# Patient Record
Sex: FEMALE | Race: OTHER | HISPANIC OR LATINO | URBAN - METROPOLITAN AREA
[De-identification: names, ages, dates, MRNs, and addresses within clinical notes are randomized per-mention and may not be internally consistent; named-entity substitution may affect disease eponyms.]

---

## 2018-02-05 ENCOUNTER — EMERGENCY (EMERGENCY)
Facility: HOSPITAL | Age: 28
LOS: 0 days | Discharge: HOME | End: 2018-02-05
Attending: EMERGENCY MEDICINE

## 2018-02-05 VITALS
TEMPERATURE: 98 F | DIASTOLIC BLOOD PRESSURE: 86 MMHG | SYSTOLIC BLOOD PRESSURE: 127 MMHG | RESPIRATION RATE: 18 BRPM | HEART RATE: 78 BPM

## 2018-02-05 VITALS
RESPIRATION RATE: 18 BRPM | SYSTOLIC BLOOD PRESSURE: 149 MMHG | OXYGEN SATURATION: 95 % | HEART RATE: 90 BPM | TEMPERATURE: 98 F | DIASTOLIC BLOOD PRESSURE: 80 MMHG

## 2018-02-05 DIAGNOSIS — J45.909 UNSPECIFIED ASTHMA, UNCOMPLICATED: ICD-10-CM

## 2018-02-05 DIAGNOSIS — F32.9 MAJOR DEPRESSIVE DISORDER, SINGLE EPISODE, UNSPECIFIED: ICD-10-CM

## 2018-02-05 DIAGNOSIS — R45.851 SUICIDAL IDEATIONS: ICD-10-CM

## 2018-02-05 LAB
ALBUMIN SERPL ELPH-MCNC: 4.4 G/DL — SIGNIFICANT CHANGE UP (ref 3–5.5)
ALP SERPL-CCNC: 95 U/L — SIGNIFICANT CHANGE UP (ref 30–115)
ALT FLD-CCNC: 16 U/L — SIGNIFICANT CHANGE UP (ref 0–41)
ANION GAP SERPL CALC-SCNC: 7 MMOL/L — SIGNIFICANT CHANGE UP (ref 7–14)
APAP SERPL-MCNC: <10 UG/ML — SIGNIFICANT CHANGE UP (ref 10–30)
APPEARANCE UR: (no result)
AST SERPL-CCNC: 20 U/L — SIGNIFICANT CHANGE UP (ref 0–41)
BASOPHILS # BLD AUTO: 0.04 K/UL — SIGNIFICANT CHANGE UP (ref 0–0.2)
BASOPHILS NFR BLD AUTO: 0.5 % — SIGNIFICANT CHANGE UP (ref 0–1)
BILIRUB SERPL-MCNC: 0.5 MG/DL — SIGNIFICANT CHANGE UP (ref 0.2–1.2)
BILIRUB UR-MCNC: NEGATIVE — SIGNIFICANT CHANGE UP
BUN SERPL-MCNC: 9 MG/DL — LOW (ref 10–20)
CALCIUM SERPL-MCNC: 9.1 MG/DL — SIGNIFICANT CHANGE UP (ref 8.5–10.1)
CHLORIDE SERPL-SCNC: 104 MMOL/L — SIGNIFICANT CHANGE UP (ref 98–110)
CO2 SERPL-SCNC: 23 MMOL/L — SIGNIFICANT CHANGE UP (ref 17–32)
COLOR SPEC: YELLOW — SIGNIFICANT CHANGE UP
CREAT SERPL-MCNC: 0.6 MG/DL — LOW (ref 0.7–1.5)
DIFF PNL FLD: NEGATIVE — SIGNIFICANT CHANGE UP
EOSINOPHIL # BLD AUTO: 0.13 K/UL — SIGNIFICANT CHANGE UP (ref 0–0.7)
EOSINOPHIL NFR BLD AUTO: 1.6 % — SIGNIFICANT CHANGE UP (ref 0–8)
ETHANOL SERPL-MCNC: 6 MG/DL — HIGH
GLUCOSE SERPL-MCNC: 100 MG/DL — SIGNIFICANT CHANGE UP (ref 70–110)
GLUCOSE UR QL: NEGATIVE MG/DL — SIGNIFICANT CHANGE UP
HCG UR QL: NEGATIVE — SIGNIFICANT CHANGE UP
HCT VFR BLD CALC: 38.5 % — SIGNIFICANT CHANGE UP (ref 37–47)
HGB BLD-MCNC: 12.1 G/DL — LOW (ref 14–18)
IMM GRANULOCYTES NFR BLD AUTO: 0.4 % — HIGH (ref 0.1–0.3)
KETONES UR-MCNC: NEGATIVE — SIGNIFICANT CHANGE UP
LEUKOCYTE ESTERASE UR-ACNC: NEGATIVE — SIGNIFICANT CHANGE UP
LYMPHOCYTES # BLD AUTO: 2.39 K/UL — SIGNIFICANT CHANGE UP (ref 1.2–3.4)
LYMPHOCYTES # BLD AUTO: 29.9 % — SIGNIFICANT CHANGE UP (ref 20.5–51.1)
MCHC RBC-ENTMCNC: 25.4 PG — LOW (ref 27–31)
MCHC RBC-ENTMCNC: 31.4 G/DL — LOW (ref 32–37)
MCV RBC AUTO: 80.7 FL — LOW (ref 81–91)
MONOCYTES # BLD AUTO: 0.55 K/UL — SIGNIFICANT CHANGE UP (ref 0.1–0.6)
MONOCYTES NFR BLD AUTO: 6.9 % — SIGNIFICANT CHANGE UP (ref 1.7–9.3)
NEUTROPHILS # BLD AUTO: 4.85 K/UL — SIGNIFICANT CHANGE UP (ref 1.4–6.5)
NEUTROPHILS NFR BLD AUTO: 60.7 % — SIGNIFICANT CHANGE UP (ref 42.2–75.2)
NITRITE UR-MCNC: NEGATIVE — SIGNIFICANT CHANGE UP
PH UR: 5.5 — SIGNIFICANT CHANGE UP (ref 5–8)
PLATELET # BLD AUTO: 353 K/UL — SIGNIFICANT CHANGE UP (ref 130–400)
POTASSIUM SERPL-MCNC: 3.6 MMOL/L — SIGNIFICANT CHANGE UP (ref 3.5–5)
POTASSIUM SERPL-SCNC: 3.6 MMOL/L — SIGNIFICANT CHANGE UP (ref 3.5–5)
PROT SERPL-MCNC: 7.3 G/DL — SIGNIFICANT CHANGE UP (ref 6–8)
PROT UR-MCNC: NEGATIVE MG/DL — SIGNIFICANT CHANGE UP
RBC # BLD: 4.77 M/UL — SIGNIFICANT CHANGE UP (ref 4.2–5.4)
RBC # FLD: 14.7 % — HIGH (ref 11.5–14.5)
SALICYLATES SERPL-MCNC: <4 MG/DL — SIGNIFICANT CHANGE UP (ref 4–30)
SODIUM SERPL-SCNC: 134 MMOL/L — LOW (ref 135–146)
SP GR SPEC: 1.02 — SIGNIFICANT CHANGE UP (ref 1.01–1.03)
UROBILINOGEN FLD QL: 1 MG/DL (ref 0.2–0.2)
WBC # BLD: 7.99 K/UL — SIGNIFICANT CHANGE UP (ref 4.8–10.8)
WBC # FLD AUTO: 7.99 K/UL — SIGNIFICANT CHANGE UP (ref 4.8–10.8)

## 2018-02-05 RX ORDER — ACETAMINOPHEN 500 MG
650 TABLET ORAL ONCE
Qty: 0 | Refills: 0 | Status: COMPLETED | OUTPATIENT
Start: 2018-02-05 | End: 2018-02-05

## 2018-02-05 RX ADMIN — Medication 650 MILLIGRAM(S): at 10:39

## 2018-02-05 RX ADMIN — Medication 650 MILLIGRAM(S): at 11:32

## 2018-02-05 NOTE — ED ADULT NURSE NOTE - OBJECTIVE STATEMENT
Pt with HX- depression in the past not on any medication C/O one episode of suicide thought at 0400 AM .pt state she" had thought of jumping from someway "  denies  no pain no SOB no suicide thought at this time remain calm ED attending at bed site 1:1 sit at bed site remain calm on going nursing observation .

## 2018-02-05 NOTE — ED BEHAVIORAL HEALTH ASSESSMENT NOTE - RISK ASSESSMENT
Although patient has low mood, several stressors, patient's risk is mitigated by not meeting criteria for other depressive symptoms, no SI/HI, no history of SA, being future oriented, responsibility toward others, having family support, and being engaged at work/school.

## 2018-02-05 NOTE — ED PROVIDER NOTE - ATTENDING CONTRIBUTION TO CARE
28 year old female, pmhx of depression, has seen a psychiatrist in the past, comes in with complaint f depression, no Suicidal plan, no HI, no AV hallucinations, no cp/sob, no n/v/d, no loc, no fever. Patient feels well otherwise. requesting to speak to a psychiatrist.     Exam: Well appearing, no acute distress, AAO x 3, sitting up and providing history, EOMI, PERRL 3mm bilateral, no nystagmus, HEENT Unremarkable, + moist mucous membranes, no pooling of secretions, no jvd, + full passive rom in neck, negative Kernig, negative Brudzinski, s1s2, no mrg, rrr, + symmetric bilateral pulses, ctabl, no wrr, good air movement overall, no pulsatile abdominal mass, abd soft, nt nd, no rebound, no guarding, no signs of peritonitis, no cva tenderness, no rash, no leg edema, dp and pt pulses intact. No calf pain, swelling or erythema, Ambulatory. Strength intact symmetrically. CN 2-12 grossly intact, GCS 15. Affest appropriate, in the room speaking with 1:1. No SI or HI, no AV hallucinations. Not agitated. P: Psychiatric eval

## 2018-02-05 NOTE — ED BEHAVIORAL HEALTH ASSESSMENT NOTE - OTHER PAST PSYCHIATRIC HISTORY (INCLUDE DETAILS REGARDING ONSET, COURSE OF ILLNESS, INPATIENT/OUTPATIENT TREATMENT)
1 adolescent IPP admission at [facility], patient was seeing a therapist up until 6-7 years ago.  No IPP admissions as an adult, No SA, No SIB.

## 2018-02-05 NOTE — ED PROVIDER NOTE - PROGRESS NOTE DETAILS
Discussed pt status with psychiatry, who will assess pt at bedside. Psychiatry bedside Discussed pt status with psychiatry, pt approved for outpatient follow up, scheduled appt on 2/15 at 8:45 am, pt aware.

## 2018-02-05 NOTE — ED PROVIDER NOTE - OBJECTIVE STATEMENT
29 yo f w hx of asthma and depression, not on medications, p/w suicidal ideation that began last night d/t stressors at work and home.  No specific plan at this time, is interested in starting antidepressant therapy.  Denies chest pain, sob, urinary symptoms.

## 2018-02-05 NOTE — ED BEHAVIORAL HEALTH ASSESSMENT NOTE - SUICIDE PROTECTIVE FACTORS
Responsibility to family and others/Supportive social network or family/Ability to cope with stress/Identifies reasons for living/Future oriented/Engaged in work or school

## 2018-02-05 NOTE — ED BEHAVIORAL HEALTH ASSESSMENT NOTE - DESCRIPTION
labs drawn thyroid disease? hypertension Patient was adopted, lives in [place] with her fiance and three children. 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

## 2018-02-05 NOTE — ED BEHAVIORAL HEALTH ASSESSMENT NOTE - DIFFERENTIAL
depressive episode with insufficient symptoms  adjustment disorder with depressed mood  anxiety disorder nos  MDD depressive episode with insufficient symptoms  adjustment disorder with depressed mood  MDD depressive disorder  depressive episode with insufficient symptoms  adjustment disorder with depressed mood  MDD

## 2018-02-05 NOTE — ED BEHAVIORAL HEALTH ASSESSMENT NOTE - DESCRIPTION (FIRST USE, LAST USE, QUANTITY, FREQUENCY, DURATION)
stopped using cigarettes 2-3 months ago, currently vaping last use was one drink yesterday after work

## 2018-02-05 NOTE — ED BEHAVIORAL HEALTH ASSESSMENT NOTE - NS ED BHA REVIEW OF ED CHART VITAL SIGNS REVIEWED
Please follow up with Dr. Mejia within 1 week after discharge from the hospital. You may call [phone] to schedule an appointment. Yes

## 2018-02-05 NOTE — ED PROVIDER NOTE - MEDICAL DECISION MAKING DETAILS
Patient has OP follow up established, patient has been cleared by psych. Patient stable for discharge, given detailed return instructions.   I personally evaluated the patient. I reviewed the Resident’s or Physician Assistant’s note (as assigned above), and agree with the findings and plan except as documented in my note.

## 2018-02-05 NOTE — ED BEHAVIORAL HEALTH ASSESSMENT NOTE - REFERRAL / APPOINTMENT DETAILS
[facility] Outpatient Psychiatry Dept. [address] [place] [id number] [phone] [facility] Psychiatry Department [address] [place] [address] [phone], February 15th at 8:45 AM

## 2018-02-05 NOTE — ED BEHAVIORAL HEALTH ASSESSMENT NOTE - HPI (INCLUDE ILLNESS QUALITY, SEVERITY, DURATION, TIMING, CONTEXT, MODIFYING FACTORS, ASSOCIATED SIGNS AND SYMPTOMS)
28 year old employed, domiciled, female, in a domestic partnership, with pphx of a "learning disability," and "depression as a kid," 1 IPP admission at [facility] as an adolescent, presents to the ED with feelings of sadness for the past 1-2 days. Psychiatry consult called for mental health evaluation. Upon approach patient is calm, cooperative seen talking to her one-to-one supervision. Patient reports that she has been feeling overwhelmed with her all of her responsibilities over the past two days. Patient reports that "Usually, I am a happy person but once in a while I feel overwhelmed." Patient also reports recent losses in the family including the father of her older 2 children, her own father, and her grandparents over the past few years. Patient reports that she has been in therapy in the past, about 6-7 years ago and reports that it was helpful. Patient also reports that she has always had trouble sleeping well. Patient reports that she is the "happiest" when she is at work because she enjoys "helping others." When asked about why she is eager to get help patient reports, "I have to be well to take care of my kids." When asked about suicidality patient reports "Never, I have three children and family." Patient denies any changes in her appetite but does report trying to lose weight since she is overweight and wants to be healthier. Patient denies any SI/HI. Patient denies any anxiety. Patient denies any periods of elevated mood, going days without sleep, and/or racing thoughts. Patient denies any AVH. Patient reports that although she has these symptoms every now and then, patient also reports that she continues to attend work, take care of her kids, the home and attend her classes. Patient does report that she has not seen a PCP in sometime, and has had thyroid disease in the past but does not take any medications.    Collateral: [phone] Bonny Nagel (patient's sister), attempted to reach twice 28 year old employed, domiciled, female, in a domestic partnership, with pphx of a "learning disability," and "depression as a kid," 1 IPP admission at [facility] as an adolescent, presents to the ED with feelings of sadness for the past 1-2 days. Psychiatry consult called for mental health evaluation. Upon approach patient is calm, cooperative seen talking to her one-to-one supervision. Patient reports that she has been feeling overwhelmed with her all of her responsibilities over the past two days. Patient reports that "Usually, I am a happy person but once in a while I feel overwhelmed." Patient also reports recent losses in the family including the father of her older 2 children, her own father, and her grandparents over the past few years. Patient reports that she has been in therapy in the past, about 6-7 years ago and reports that it was helpful. Patient also reports that she has always had trouble sleeping well. Patient reports that she is the "happiest" when she is at work because she enjoys "helping others." When asked about why she is eager to get help patient reports, "I have to be well to take care of my kids." When asked about suicidality patient reports "Never, I have three children and family." Patient denies any changes in her appetite but does report trying to lose weight since she is overweight and wants to be healthier. Patient denies any SI/HI. Patient denies any anxiety. Patient denies any periods of elevated mood, going days without sleep, and/or racing thoughts. Patient denies any AVH. Patient reports that although she has these symptoms every now and then, patient also reports that she continues to attend work, take care of her kids, the home and attend her classes. Patient does report that she has not seen a PCP in sometime, and has had thyroid disease in the past but does not take any medications.    Collateral: [phone] Bonny Nagel (patient's sister), attempted to reach twice, unable to reach her

## 2018-02-05 NOTE — ED PROVIDER NOTE - PHYSICAL EXAMINATION
CONSTITUTIONAL: Well-developed; well-nourished; in no acute distress.   SKIN: warm, dry  HEAD: Normocephalic; atraumatic.  EYES: no conj injection  ENT: No nasal discharge; airway clear.  NECK: Supple; non tender.  CARD: S1, S2 normal; no murmurs, gallops, or rubs. Regular rate and rhythm.   RESP: No wheezes, rales or rhonchi.  ABD: soft ntnd  EXT: Normal ROM.  No clubbing, cyanosis or edema.   NEURO: Alert, oriented, grossly unremarkable  PSYCH: Cooperative, anxious.

## 2018-02-05 NOTE — ED PROVIDER NOTE - NS ED ROS FT
GEN:  No fevers, chills  Eyes:  No visual changes, eye pain or discharge.  ENMT:  No hearing changes, pain, No neck pain or stiffness.  Cardiac:  No chest pain, SOB   Respiratory:  No cough or respiratory distress.   GI:  No nausea, vomiting, diarrhea or abdominal pain.  :  No dysuria, frequency or burning.  MS:  No myalgia, muscle weakness, joint pain or back pain.  Neuro:  No headache or weakness.  No LOC.  Skin:  No skin rash.   Endocrine: No history of thyroid disease or diabetes.

## 2018-02-05 NOTE — ED BEHAVIORAL HEALTH ASSESSMENT NOTE - CASE SUMMARY
Patient will benefit from supportive psychotherapy on out patient basis. No psychotropic medication is recommended at this time.

## 2018-02-05 NOTE — ED BEHAVIORAL HEALTH ASSESSMENT NOTE - SUMMARY
28 year old female with pphx of depression as an adolescent, presents to the ED for low mood and feeling overwhelmed over the past few days. Upon examination patient appears calm, cooperative, appears to has linear thought process, congruent affect, no perceptual abnormalities, good insight and judgement. Patient denies any suicidality and/or homicidality. At this time patient does not appear to meet criteria for acute mood/psychosis symptoms. Patient does not demonstrate need for involuntary IPP stabilization. Patient may benefit from outpatient psychiatry care for further assessment and treatment. Patient may also benefit from having thyroid function evaluated and treating appropriately.

## 2018-04-20 ENCOUNTER — EMERGENCY (EMERGENCY)
Facility: HOSPITAL | Age: 28
LOS: 0 days | Discharge: HOME | End: 2018-04-20
Attending: STUDENT IN AN ORGANIZED HEALTH CARE EDUCATION/TRAINING PROGRAM | Admitting: STUDENT IN AN ORGANIZED HEALTH CARE EDUCATION/TRAINING PROGRAM

## 2018-04-20 VITALS
OXYGEN SATURATION: 98 % | HEART RATE: 71 BPM | TEMPERATURE: 98 F | RESPIRATION RATE: 18 BRPM | SYSTOLIC BLOOD PRESSURE: 126 MMHG | DIASTOLIC BLOOD PRESSURE: 73 MMHG

## 2018-04-20 VITALS
TEMPERATURE: 99 F | RESPIRATION RATE: 18 BRPM | DIASTOLIC BLOOD PRESSURE: 70 MMHG | HEART RATE: 76 BPM | SYSTOLIC BLOOD PRESSURE: 132 MMHG | OXYGEN SATURATION: 98 %

## 2018-04-20 DIAGNOSIS — Z88.8 ALLERGY STATUS TO OTHER DRUGS, MEDICAMENTS AND BIOLOGICAL SUBSTANCES STATUS: ICD-10-CM

## 2018-04-20 DIAGNOSIS — R42 DIZZINESS AND GIDDINESS: ICD-10-CM

## 2018-04-20 LAB
ALBUMIN SERPL ELPH-MCNC: 4.4 G/DL — SIGNIFICANT CHANGE UP (ref 3.5–5.2)
ALP SERPL-CCNC: 107 U/L — SIGNIFICANT CHANGE UP (ref 30–115)
ALT FLD-CCNC: 10 U/L — SIGNIFICANT CHANGE UP (ref 0–41)
ANION GAP SERPL CALC-SCNC: 13 MMOL/L — SIGNIFICANT CHANGE UP (ref 7–14)
AST SERPL-CCNC: 14 U/L — SIGNIFICANT CHANGE UP (ref 0–41)
BILIRUB SERPL-MCNC: 0.3 MG/DL — SIGNIFICANT CHANGE UP (ref 0.2–1.2)
BUN SERPL-MCNC: 10 MG/DL — SIGNIFICANT CHANGE UP (ref 10–20)
CALCIUM SERPL-MCNC: 9.3 MG/DL — SIGNIFICANT CHANGE UP (ref 8.5–10.1)
CHLORIDE SERPL-SCNC: 101 MMOL/L — SIGNIFICANT CHANGE UP (ref 98–110)
CO2 SERPL-SCNC: 27 MMOL/L — SIGNIFICANT CHANGE UP (ref 17–32)
CREAT SERPL-MCNC: 0.6 MG/DL — LOW (ref 0.7–1.5)
GLUCOSE SERPL-MCNC: 72 MG/DL — SIGNIFICANT CHANGE UP (ref 70–99)
HCT VFR BLD CALC: 38.3 % — SIGNIFICANT CHANGE UP (ref 37–47)
HGB BLD-MCNC: 12.2 G/DL — SIGNIFICANT CHANGE UP (ref 12–16)
MCHC RBC-ENTMCNC: 25.8 PG — LOW (ref 27–31)
MCHC RBC-ENTMCNC: 31.9 G/DL — LOW (ref 32–37)
MCV RBC AUTO: 81 FL — SIGNIFICANT CHANGE UP (ref 81–99)
NRBC # BLD: 0 /100 WBCS — SIGNIFICANT CHANGE UP (ref 0–0)
PLATELET # BLD AUTO: 340 K/UL — SIGNIFICANT CHANGE UP (ref 130–400)
POTASSIUM SERPL-MCNC: 4.1 MMOL/L — SIGNIFICANT CHANGE UP (ref 3.5–5)
POTASSIUM SERPL-SCNC: 4.1 MMOL/L — SIGNIFICANT CHANGE UP (ref 3.5–5)
PROT SERPL-MCNC: 7.2 G/DL — SIGNIFICANT CHANGE UP (ref 6–8)
RBC # BLD: 4.73 M/UL — SIGNIFICANT CHANGE UP (ref 4.2–5.4)
RBC # FLD: 14.5 % — SIGNIFICANT CHANGE UP (ref 11.5–14.5)
SODIUM SERPL-SCNC: 141 MMOL/L — SIGNIFICANT CHANGE UP (ref 135–146)
TROPONIN T SERPL-MCNC: <0.01 NG/ML — SIGNIFICANT CHANGE UP
WBC # BLD: 6.05 K/UL — SIGNIFICANT CHANGE UP (ref 4.8–10.8)
WBC # FLD AUTO: 6.05 K/UL — SIGNIFICANT CHANGE UP (ref 4.8–10.8)

## 2018-04-20 RX ORDER — MECLIZINE HCL 12.5 MG
1 TABLET ORAL
Qty: 5 | Refills: 0 | OUTPATIENT
Start: 2018-04-20 | End: 2018-04-24

## 2018-04-20 RX ORDER — MECLIZINE HCL 12.5 MG
1 TABLET ORAL
Qty: 5 | Refills: 0
Start: 2018-04-20 | End: 2018-04-24

## 2018-04-20 RX ORDER — MECLIZINE HCL 12.5 MG
25 TABLET ORAL ONCE
Qty: 0 | Refills: 0 | Status: DISCONTINUED | OUTPATIENT
Start: 2018-04-20 | End: 2018-04-20

## 2018-04-20 NOTE — ED PROVIDER NOTE - OBJECTIVE STATEMENT
27 y/o F with pmh as below presents c/o of vertigo like symptoms. No nausea, vomiting. No HA. No hearing loss or pain. No tinnitus. No palpitations. No CP, SOB.

## 2018-04-20 NOTE — ED PROVIDER NOTE - ATTENDING CONTRIBUTION TO CARE
27 y/o F no sig pmh, p/w dizziness, described as vertigo.  Sx worse w/ position change, extinguish w/ remaining still.  NO fever, trauma, uri sx, other focal neuro deficit, or HA.  PE as noted.  Pt has mild sx, is well appearing, non focal neuro exam.  IMP: vertigo, NL exam. P: meclizine, upreg, IVF, reassess.

## 2018-05-04 ENCOUNTER — OUTPATIENT (OUTPATIENT)
Dept: OUTPATIENT SERVICES | Facility: HOSPITAL | Age: 28
LOS: 1 days | Discharge: HOME | End: 2018-05-04

## 2018-05-04 ENCOUNTER — LABORATORY RESULT (OUTPATIENT)
Age: 28
End: 2018-05-04

## 2018-05-04 ENCOUNTER — APPOINTMENT (OUTPATIENT)
Dept: OBGYN | Facility: CLINIC | Age: 28
End: 2018-05-04

## 2018-05-04 VITALS
WEIGHT: 250 LBS | HEIGHT: 63 IN | DIASTOLIC BLOOD PRESSURE: 90 MMHG | BODY MASS INDEX: 44.3 KG/M2 | SYSTOLIC BLOOD PRESSURE: 128 MMHG

## 2018-05-04 DIAGNOSIS — Z86.19 PERSONAL HISTORY OF OTHER INFECTIOUS AND PARASITIC DISEASES: ICD-10-CM

## 2018-05-07 LAB
B-HCG UR QL: NEGATIVE
HCG UR QL: NEGATIVE

## 2018-05-17 ENCOUNTER — OUTPATIENT (OUTPATIENT)
Dept: OUTPATIENT SERVICES | Facility: HOSPITAL | Age: 28
LOS: 1 days | Discharge: HOME | End: 2018-05-17

## 2018-05-17 ENCOUNTER — APPOINTMENT (OUTPATIENT)
Dept: ANTEPARTUM | Facility: CLINIC | Age: 28
End: 2018-05-17

## 2018-05-18 ENCOUNTER — APPOINTMENT (OUTPATIENT)
Dept: OBGYN | Facility: CLINIC | Age: 28
End: 2018-05-18

## 2018-05-18 VITALS — BODY MASS INDEX: 45.35 KG/M2 | DIASTOLIC BLOOD PRESSURE: 80 MMHG | WEIGHT: 256 LBS | SYSTOLIC BLOOD PRESSURE: 112 MMHG

## 2018-05-18 LAB
APTT BLD: 34.1 SEC
BASOPHILS # BLD AUTO: 0.03 K/UL
BASOPHILS NFR BLD AUTO: 0.5 %
CHOLEST SERPL-MCNC: 210 MG/DL
CHOLEST/HDLC SERPL: 4.2 RATIO
DHEA-S SERPL-MCNC: 113 UG/DL
EOSINOPHIL # BLD AUTO: 0.09 K/UL
EOSINOPHIL NFR BLD AUTO: 1.6 %
ESTRADIOL SERPL-MCNC: 146 PG/ML
FSH SERPL-MCNC: 3.8 IU/L
GLUCOSE 1H P 100 G GLC PO SERPL-MCNC: 98 MG/DL
GLUCOSE 2H P 100 G GLC PO SERPL-MCNC: 57 MG/DL
GLUCOSE BS SERPL-MCNC: 90 MG/DL
HCG SERPL-MCNC: <0.6 MIU/ML
HCT VFR BLD CALC: 37 %
HDLC SERPL-MCNC: 50 MG/DL
HGB BLD-MCNC: 11.1 G/DL
IMM GRANULOCYTES NFR BLD AUTO: 0.2 %
INR PPP: 1.13 RATIO
LDLC SERPL CALC-MCNC: 154 MG/DL
LYMPHOCYTES # BLD AUTO: 1.84 K/UL
LYMPHOCYTES NFR BLD AUTO: 32.7 %
MAN DIFF?: NORMAL
MCHC RBC-ENTMCNC: 25.4 PG
MCHC RBC-ENTMCNC: 30 G/DL
MCV RBC AUTO: 84.7 FL
MONOCYTES # BLD AUTO: 0.49 K/UL
MONOCYTES NFR BLD AUTO: 8.7 %
NEUTROPHILS # BLD AUTO: 3.17 K/UL
NEUTROPHILS NFR BLD AUTO: 56.3 %
PLATELET # BLD AUTO: 334 K/UL
PROLACTIN SERPL-MCNC: 8 NG/ML
PT BLD: 12.1 SEC
RBC # BLD: 4.37 M/UL
RBC # FLD: 15 %
T4 FREE SERPL-MCNC: 1.2 NG/DL
TRIGL SERPL-MCNC: 103 MG/DL
TSH SERPL-ACNC: 2.43 UIU/ML
WBC # FLD AUTO: 5.63 K/UL

## 2018-05-21 LAB
TESTOST BND SERPL-MCNC: 0.6 PG/ML
TESTOST SERPL-MCNC: 15.6 NG/DL

## 2018-05-22 DIAGNOSIS — Z00.00 ENCOUNTER FOR GENERAL ADULT MEDICAL EXAMINATION WITHOUT ABNORMAL FINDINGS: ICD-10-CM

## 2018-05-25 ENCOUNTER — OUTPATIENT (OUTPATIENT)
Dept: OUTPATIENT SERVICES | Facility: HOSPITAL | Age: 28
LOS: 1 days | Discharge: HOME | End: 2018-05-25

## 2018-05-25 ENCOUNTER — LABORATORY RESULT (OUTPATIENT)
Age: 28
End: 2018-05-25

## 2018-05-25 ENCOUNTER — APPOINTMENT (OUTPATIENT)
Dept: OBGYN | Facility: CLINIC | Age: 28
End: 2018-05-25

## 2018-05-25 VITALS — WEIGHT: 255 LBS | BODY MASS INDEX: 45.17 KG/M2 | SYSTOLIC BLOOD PRESSURE: 126 MMHG | DIASTOLIC BLOOD PRESSURE: 80 MMHG

## 2018-05-25 LAB
17OHP SERPL-MCNC: 35 NG/DL
HPV HIGH+LOW RISK DNA PNL CVX: DETECTED

## 2018-05-29 DIAGNOSIS — R89.7 ABNORMAL HISTOLOGICAL FINDINGS IN SPECIMENS FROM OTHER ORGANS, SYSTEMS AND TISSUES: ICD-10-CM

## 2018-05-29 LAB
HCG UR QL: NEGATIVE
QUALITY CONTROL: YES

## 2018-06-15 ENCOUNTER — APPOINTMENT (OUTPATIENT)
Dept: OBGYN | Facility: CLINIC | Age: 28
End: 2018-06-15

## 2018-06-25 ENCOUNTER — OTHER (OUTPATIENT)
Age: 28
End: 2018-06-25

## 2018-07-06 ENCOUNTER — APPOINTMENT (OUTPATIENT)
Dept: OBGYN | Facility: CLINIC | Age: 28
End: 2018-07-06

## 2019-03-01 ENCOUNTER — EMERGENCY (EMERGENCY)
Facility: HOSPITAL | Age: 29
LOS: 1 days | Discharge: HOME | End: 2019-03-01
Attending: EMERGENCY MEDICINE | Admitting: EMERGENCY MEDICINE
Payer: MEDICAID

## 2019-03-01 VITALS
HEART RATE: 97 BPM | TEMPERATURE: 98 F | RESPIRATION RATE: 17 BRPM | SYSTOLIC BLOOD PRESSURE: 138 MMHG | OXYGEN SATURATION: 98 % | DIASTOLIC BLOOD PRESSURE: 78 MMHG

## 2019-03-01 DIAGNOSIS — Z79.899 OTHER LONG TERM (CURRENT) DRUG THERAPY: ICD-10-CM

## 2019-03-01 DIAGNOSIS — O26.892 OTHER SPECIFIED PREGNANCY RELATED CONDITIONS, SECOND TRIMESTER: ICD-10-CM

## 2019-03-01 DIAGNOSIS — Z3A.17 17 WEEKS GESTATION OF PREGNANCY: ICD-10-CM

## 2019-03-01 DIAGNOSIS — R10.2 PELVIC AND PERINEAL PAIN: ICD-10-CM

## 2019-03-01 DIAGNOSIS — M54.5 LOW BACK PAIN: ICD-10-CM

## 2019-03-01 DIAGNOSIS — I10 ESSENTIAL (PRIMARY) HYPERTENSION: ICD-10-CM

## 2019-03-01 PROCEDURE — 99283 EMERGENCY DEPT VISIT LOW MDM: CPT | Mod: 25

## 2019-03-01 NOTE — ED ADULT TRIAGE NOTE - CHIEF COMPLAINT QUOTE
pt ambulatory complaining of sudden onset lower back pain [place] the train. State she's 17 weeks pregnant, LMP Nov 7, 2019. Also reports pelvic pain for few weeks now. pt ambulatory complaining of sudden onset lower back pain while seated on the train. State she's 17 weeks pregnant, LMP Nov 2, 2019. Also reports pelvic pain for few weeks now. States she fell one flight of stairs prior to arrival.

## 2019-03-02 VITALS
RESPIRATION RATE: 17 BRPM | TEMPERATURE: 98 F | DIASTOLIC BLOOD PRESSURE: 76 MMHG | SYSTOLIC BLOOD PRESSURE: 111 MMHG | OXYGEN SATURATION: 98 % | HEART RATE: 77 BPM

## 2019-03-02 LAB
APPEARANCE UR: CLEAR — SIGNIFICANT CHANGE UP
BILIRUB UR-MCNC: NEGATIVE — SIGNIFICANT CHANGE UP
COLOR SPEC: YELLOW — SIGNIFICANT CHANGE UP
DIFF PNL FLD: NEGATIVE — SIGNIFICANT CHANGE UP
GLUCOSE UR QL: NEGATIVE — SIGNIFICANT CHANGE UP
KETONES UR-MCNC: NEGATIVE — SIGNIFICANT CHANGE UP
LEUKOCYTE ESTERASE UR-ACNC: NEGATIVE — SIGNIFICANT CHANGE UP
NITRITE UR-MCNC: NEGATIVE — SIGNIFICANT CHANGE UP
PH UR: 6 — SIGNIFICANT CHANGE UP (ref 5–8)
PROT UR-MCNC: NEGATIVE MG/DL — SIGNIFICANT CHANGE UP
SP GR SPEC: >=1.03 — SIGNIFICANT CHANGE UP (ref 1–1.03)
UROBILINOGEN FLD QL: 0.2 E.U./DL — SIGNIFICANT CHANGE UP

## 2019-03-02 RX ORDER — ACETAMINOPHEN 500 MG
650 TABLET ORAL ONCE
Qty: 0 | Refills: 0 | Status: COMPLETED | OUTPATIENT
Start: 2019-03-02 | End: 2019-03-02

## 2019-03-02 RX ADMIN — Medication 650 MILLIGRAM(S): at 01:44

## 2019-03-02 RX ADMIN — Medication 650 MILLIGRAM(S): at 00:53

## 2019-03-02 NOTE — ED ADULT NURSE NOTE - NSIMPLEMENTINTERV_GEN_ALL_ED
Implemented All Universal Safety Interventions:  [place] to call system. Call bell, personal items and telephone within reach. Instruct patient to call for assistance. Room bathroom lighting operational. Non-slip footwear when patient is off stretcher. Physically safe environment: no spills, clutter or unnecessary equipment. Stretcher in lowest position, wheels locked, appropriate side rails in place.

## 2019-03-02 NOTE — ED PROVIDER NOTE - CHPI ED SYMPTOMS NEG
no loss of consciousness/no vomiting/no numbness/no tingling/no weakness/no SOB, no vaginal bleeding, no nausea, no head injury

## 2019-03-02 NOTE — ED PROVIDER NOTE - OBJECTIVE STATEMENT
30 yo female with PMHX of HTN (controlled through diet, no meds), 17 weeks pregnant with twins, taking prenatals and folic acid, ambulates to ED for lower back pain s/p slip and fall down 5-6 stairs today about 1 hr PTA. Pt landed on her buttock and back. Notes developing "contraction" like pain in her lower back pain and pelvic pressure while riding the train and decided to travel to the nearest hospital. Denies abdominal pain. Pt is currently receiving prenatal care at [facility] in the [place]. Pt also mentioned having some left ankle pain after mildly spraining in during the fall. Reports feeling current fetal movement. No syncope, SOB, vaginal bleeding, N/V, head injury, numbness, tingling, or other sx. LNMP 2018. Estimated due date . A1. 30 yo F, with PMHX of HTN (controlled through diet, no meds), A1,17 weeks gestation with twins, VAISHALI , LMP 17, taking prenatals and folic acid, ambulates to ED for lower back pain s/p slip and fall down 5-6 stairs today about 1 hr PTA. Pt landed on her buttock and back. Notes developing "contraction" like pain in her lower back pain and pelvic pressure while riding the train and decided to travel to the nearest hospital. Denies abdominal pain. Pt is currently receiving prenatal care at [facility] in the [place]. Pt also mentioned having some left ankle pain after mildly spraining in during the fall. Reports feeling current fetal movement. No syncope, SOB, vaginal bleeding, N/V, head injury, numbness, tingling, or other sx. Estimated due date . A1.

## 2019-03-02 NOTE — ED PROVIDER NOTE - CONSTITUTIONAL, MLM
normal... Obese, awake, alert, oriented to person, place, time/situation and mildly uncomfortable appearing.

## 2019-03-02 NOTE — ED PROVIDER NOTE - PROGRESS NOTE DETAILS
Pt discussed with OB resident hansa who discussed with Dr. Barbour.  Pt safe to discharge with strict return precautions for persistent or worsening, vaginal bleeding, abdominal pain.  Pt must f/u with ob on Monday.  If she is not able to be seen in office, she should present to [facility] ED for obgyn eval.

## 2019-03-02 NOTE — ED PROVIDER NOTE - CLINICAL SUMMARY MEDICAL DECISION MAKING FREE TEXT BOX
28 y/o F, 17 weeks gestation presents to ED c/o lumbar back pain s/p mechanical fall down stair.  Pt well appearing, VSS, ambulatory and full weight bearing.

## 2019-03-02 NOTE — ED ADULT NURSE NOTE - OBJECTIVE STATEMENT
Pt states she slipped and [place]. Pt complains of lower back pain and pain to her pelvic bone.

## 2019-03-27 ENCOUNTER — OUTPATIENT (OUTPATIENT)
Dept: OUTPATIENT SERVICES | Facility: HOSPITAL | Age: 29
LOS: 1 days | Discharge: HOME | End: 2019-03-27

## 2019-03-27 ENCOUNTER — APPOINTMENT (OUTPATIENT)
Dept: ANTEPARTUM | Facility: CLINIC | Age: 29
End: 2019-03-27

## 2019-03-27 ENCOUNTER — NON-APPOINTMENT (OUTPATIENT)
Age: 29
End: 2019-03-27

## 2019-03-27 ENCOUNTER — LABORATORY RESULT (OUTPATIENT)
Age: 29
End: 2019-03-27

## 2019-03-27 ENCOUNTER — RESULT CHARGE (OUTPATIENT)
Age: 29
End: 2019-03-27

## 2019-03-27 VITALS
BODY MASS INDEX: 45.36 KG/M2 | HEIGHT: 63 IN | OXYGEN SATURATION: 98 % | TEMPERATURE: 98.1 F | HEART RATE: 95 BPM | SYSTOLIC BLOOD PRESSURE: 120 MMHG | WEIGHT: 256 LBS | DIASTOLIC BLOOD PRESSURE: 79 MMHG

## 2019-03-27 LAB
BILIRUB UR QL STRIP: NEGATIVE
CLARITY UR: NORMAL
COLLECTION METHOD: NORMAL
GLUCOSE UR-MCNC: NEGATIVE
HCG UR QL: 0.2 EU/DL
HGB UR QL STRIP.AUTO: NEGATIVE
KETONES UR-MCNC: NEGATIVE
LEUKOCYTE ESTERASE UR QL STRIP: NORMAL
NITRITE UR QL STRIP: NEGATIVE
PH UR STRIP: 6.5
PROT UR STRIP-MCNC: NORMAL
SP GR UR STRIP: 1.02

## 2019-03-27 RX ORDER — NORETHINDRONE ACETATE AND ETHINYL ESTRADIOL 1MG-20(21)
1-20 KIT ORAL DAILY
Qty: 28 | Refills: 12 | Status: COMPLETED | COMMUNITY
Start: 2018-05-18 | End: 2019-03-27

## 2019-03-27 RX ORDER — TERCONAZOLE 4 MG/G
0.4 CREAM VAGINAL
Qty: 1 | Refills: 0 | Status: COMPLETED | COMMUNITY
Start: 2018-06-25 | End: 2019-03-27

## 2019-03-27 RX ORDER — IMIQUIMOD 50 MG/G
5 CREAM TOPICAL
Qty: 1 | Refills: 2 | Status: COMPLETED | COMMUNITY
Start: 2018-06-25 | End: 2019-03-27

## 2019-03-29 DIAGNOSIS — Z3A.21 21 WEEKS GESTATION OF PREGNANCY: ICD-10-CM

## 2019-03-29 DIAGNOSIS — O35.8XX0 MATERNAL CARE FOR OTHER (SUSPECTED) FETAL ABNORMALITY AND DAMAGE, NOT APPLICABLE OR UNSPECIFIED: ICD-10-CM

## 2019-03-29 DIAGNOSIS — O34.211 MATERNAL CARE FOR LOW TRANSVERSE SCAR FROM PREVIOUS CESAREAN DELIVERY: ICD-10-CM

## 2019-03-29 DIAGNOSIS — O09.92 SUPERVISION OF HIGH RISK PREGNANCY, UNSPECIFIED, SECOND TRIMESTER: ICD-10-CM

## 2019-03-29 DIAGNOSIS — O30.049 TWIN PREGNANCY, DICHORIONIC/DIAMNIOTIC, UNSPECIFIED TRIMESTER: ICD-10-CM

## 2019-04-23 ENCOUNTER — APPOINTMENT (OUTPATIENT)
Dept: PEDIATRIC CARDIOLOGY | Facility: CLINIC | Age: 29
End: 2019-04-23

## 2019-04-24 ENCOUNTER — APPOINTMENT (OUTPATIENT)
Dept: OBGYN | Facility: CLINIC | Age: 29
End: 2019-04-24

## 2019-04-24 ENCOUNTER — APPOINTMENT (OUTPATIENT)
Dept: ANTEPARTUM | Facility: CLINIC | Age: 29
End: 2019-04-24

## 2019-05-02 ENCOUNTER — NON-APPOINTMENT (OUTPATIENT)
Age: 29
End: 2019-05-02

## 2019-05-02 ENCOUNTER — APPOINTMENT (OUTPATIENT)
Dept: ANTEPARTUM | Facility: CLINIC | Age: 29
End: 2019-05-02
Payer: MEDICAID

## 2019-05-02 ENCOUNTER — OUTPATIENT (OUTPATIENT)
Dept: OUTPATIENT SERVICES | Facility: HOSPITAL | Age: 29
LOS: 1 days | Discharge: HOME | End: 2019-05-02

## 2019-05-02 ENCOUNTER — APPOINTMENT (OUTPATIENT)
Dept: OBGYN | Facility: CLINIC | Age: 29
End: 2019-05-02
Payer: MEDICAID

## 2019-05-02 ENCOUNTER — RESULT CHARGE (OUTPATIENT)
Age: 29
End: 2019-05-02

## 2019-05-02 VITALS
SYSTOLIC BLOOD PRESSURE: 131 MMHG | TEMPERATURE: 97.9 F | DIASTOLIC BLOOD PRESSURE: 75 MMHG | BODY MASS INDEX: 47.04 KG/M2 | HEART RATE: 80 BPM | OXYGEN SATURATION: 100 % | WEIGHT: 265.56 LBS

## 2019-05-02 DIAGNOSIS — Z00.00 ENCOUNTER FOR GENERAL ADULT MEDICAL EXAMINATION WITHOUT ABNORMAL FINDINGS: ICD-10-CM

## 2019-05-02 LAB
ADDL FETAL HEART RATE: 136
ADDL FETAL HEART RATE: 140
ADDL FETAL MOVEMENT: PRESENT
ADDL FETAL MOVEMENT: PRESENT
BILIRUB UR QL STRIP: NEGATIVE
CLARITY UR: CLEAR
COLLECTION METHOD: NORMAL
FETAL HEART DESCRIPTION: NORMAL
FETAL HEART RATE (BPM): 149
FETAL HEART RATE (BPM): 160
FETAL MOVEMENT: PRESENT
FETAL MOVEMENT: PRESENT
GLUCOSE UR-MCNC: NEGATIVE
HCG UR QL: 0.2 EU/DL
HGB UR QL STRIP.AUTO: NEGATIVE
KETONES UR-MCNC: NEGATIVE
LEUKOCYTE ESTERASE UR QL STRIP: NORMAL
NITRITE UR QL STRIP: NEGATIVE
PH UR STRIP: 6.5
PROT UR STRIP-MCNC: NORMAL
SCHEDULED VISIT: YES
SP GR UR STRIP: 1.01
URINE ALBUMIN/PROTEIN: NORMAL
URINE ALBUMIN/PROTEIN: NORMAL
URINE GLUCOSE: NEGATIVE
URINE GLUCOSE: NEGATIVE
URINE KETONES: NEGATIVE
URINE KETONES: NEGATIVE
WEEKS GESTATION: 25.6

## 2019-05-02 PROCEDURE — 99214 OFFICE O/P EST MOD 30 MIN: CPT | Mod: 25,TH

## 2019-05-02 PROCEDURE — 76816 OB US FOLLOW-UP PER FETUS: CPT | Mod: 26,59

## 2019-05-04 ENCOUNTER — OUTPATIENT (OUTPATIENT)
Dept: OUTPATIENT SERVICES | Facility: HOSPITAL | Age: 29
LOS: 1 days | Discharge: HOME | End: 2019-05-04

## 2019-05-04 DIAGNOSIS — Z00.00 ENCOUNTER FOR GENERAL ADULT MEDICAL EXAMINATION WITHOUT ABNORMAL FINDINGS: ICD-10-CM

## 2019-05-04 DIAGNOSIS — O30.049 TWIN PREGNANCY, DICHORIONIC/DIAMNIOTIC, UNSPECIFIED TRIMESTER: ICD-10-CM

## 2019-05-06 DIAGNOSIS — O30.042 TWIN PREGNANCY, DICHORIONIC/DIAMNIOTIC, SECOND TRIMESTER: ICD-10-CM

## 2019-05-06 DIAGNOSIS — O26.899 OTHER SPECIFIED PREGNANCY RELATED CONDITIONS, UNSPECIFIED TRIMESTER: ICD-10-CM

## 2019-05-06 DIAGNOSIS — O99.212 OBESITY COMPLICATING PREGNANCY, SECOND TRIMESTER: ICD-10-CM

## 2019-05-08 LAB
ABO + RH PNL BLD: NORMAL
ALBUMIN SERPL ELPH-MCNC: 3.5 G/DL
ALP BLD-CCNC: 76 U/L
ALT SERPL-CCNC: 7 U/L
ANION GAP SERPL CALC-SCNC: 12 MMOL/L
APPEARANCE: ABNORMAL
AR GENE MUT ANL BLD/T: NEGATIVE
AST SERPL-CCNC: 11 U/L
BACTERIA UR CULT: NORMAL
BASOPHILS # BLD AUTO: 0.03 K/UL
BASOPHILS NFR BLD AUTO: 0.3 %
BILIRUB SERPL-MCNC: 0.3 MG/DL
BILIRUBIN URINE: ABNORMAL
BLD GP AB SCN SERPL QL: NORMAL
BLOOD URINE: NEGATIVE
BUN SERPL-MCNC: 5 MG/DL
CALCIUM SERPL-MCNC: 9.2 MG/DL
CHLORIDE SERPL-SCNC: 101 MMOL/L
CO2 SERPL-SCNC: 22 MMOL/L
COLOR: ABNORMAL
CREAT SERPL-MCNC: 0.5 MG/DL
EOSINOPHIL # BLD AUTO: 0.12 K/UL
EOSINOPHIL NFR BLD AUTO: 1.2 %
FMR1 GENE MUT ANL BLD/T: NORMAL
GLUCOSE 1H P 50 G GLC PO SERPL-MCNC: 103 MG/DL
GLUCOSE QUALITATIVE U: NEGATIVE
GLUCOSE SERPL-MCNC: 105 MG/DL
HBV SURFACE AG SERPL QL IA: NONREACTIVE
HCT VFR BLD CALC: 32.2 %
HGB A MFR BLD: 97.5 %
HGB A2 MFR BLD: 2.5 %
HGB BLD-MCNC: 10.5 G/DL
HGB FRACT BLD-IMP: NORMAL
HIV1+2 AB SPEC QL IA.RAPID: NONREACTIVE
IMM GRANULOCYTES NFR BLD AUTO: 0.9 %
KETONES URINE: 15
LDH SERPL-CCNC: 112
LEAD BLD-MCNC: <1 UG/DL
LEUKOCYTE ESTERASE URINE: NEGATIVE
LYMPHOCYTES # BLD AUTO: 1.64 K/UL
LYMPHOCYTES NFR BLD AUTO: 16.5 %
MAN DIFF?: NORMAL
MCHC RBC-ENTMCNC: 29.2 PG
MCHC RBC-ENTMCNC: 32.6 G/DL
MCV RBC AUTO: 89.4 FL
MONOCYTES # BLD AUTO: 0.76 K/UL
MONOCYTES NFR BLD AUTO: 7.7 %
NEUTROPHILS # BLD AUTO: 7.28 K/UL
NEUTROPHILS NFR BLD AUTO: 73.4 %
NITRITE URINE: NEGATIVE
PH URINE: 6
PLATELET # BLD AUTO: 237 K/UL
POTASSIUM SERPL-SCNC: 3.4 MMOL/L
PROT SERPL-MCNC: 6.1 G/DL
PROTEIN URINE: ABNORMAL
RBC # BLD: 3.6 M/UL
RBC # FLD: 14.6 %
RUBV IGG FLD-ACNC: 1.3 INDEX
RUBV IGG SER-IMP: POSITIVE
SODIUM SERPL-SCNC: 135 MMOL/L
SPECIFIC GRAVITY URINE: >=1.03
T PALLIDUM AB SER QL IA: NEGATIVE
URATE SERPL-MCNC: 4.2 MG/DL
UROBILINOGEN URINE: 1 (ref 0.2–?)
VZV AB TITR SER: POSITIVE
VZV IGG SER IF-ACNC: 508.7 INDEX
WBC # FLD AUTO: 9.92 K/UL

## 2019-05-10 LAB
M TB IFN-G BLD-IMP: NEGATIVE
QUANTIFERON TB PLUS MITOGEN MINUS NIL: 0.72 IU/ML
QUANTIFERON TB PLUS NIL: 0.01 IU/ML
QUANTIFERON TB PLUS TB1 MINUS NIL: 0 IU/ML
QUANTIFERON TB PLUS TB2 MINUS NIL: 0 IU/ML

## 2019-05-13 LAB
A VAGINAE DNA VAG QL NAA+PROBE: ABNORMAL
BVAB2 DNA VAG QL NAA+PROBE: ABNORMAL
C KRUSEI DNA VAG QL NAA+PROBE: NEGATIVE
C KRUSEI DNA VAG QL NAA+PROBE: POSITIVE
C TRACH RRNA SPEC QL NAA+PROBE: NEGATIVE
HPV HIGH+LOW RISK DNA PNL CVX: NOT DETECTED
MEGA1 DNA VAG QL NAA+PROBE: ABNORMAL
N GONORRHOEA RRNA SPEC QL NAA+PROBE: NEGATIVE
T VAGINALIS RRNA SPEC QL NAA+PROBE: NEGATIVE

## 2019-05-16 ENCOUNTER — APPOINTMENT (OUTPATIENT)
Dept: ANTEPARTUM | Facility: CLINIC | Age: 29
End: 2019-05-16
Payer: MEDICAID

## 2019-05-17 ENCOUNTER — NON-APPOINTMENT (OUTPATIENT)
Age: 29
End: 2019-05-17

## 2019-05-17 ENCOUNTER — OUTPATIENT (OUTPATIENT)
Dept: OUTPATIENT SERVICES | Facility: HOSPITAL | Age: 29
LOS: 1 days | Discharge: HOME | End: 2019-05-17

## 2019-05-17 ENCOUNTER — RESULT CHARGE (OUTPATIENT)
Age: 29
End: 2019-05-17

## 2019-05-17 ENCOUNTER — APPOINTMENT (OUTPATIENT)
Dept: ANTEPARTUM | Facility: CLINIC | Age: 29
End: 2019-05-17
Payer: MEDICAID

## 2019-05-17 VITALS
HEART RATE: 99 BPM | DIASTOLIC BLOOD PRESSURE: 56 MMHG | BODY MASS INDEX: 47.65 KG/M2 | WEIGHT: 269 LBS | SYSTOLIC BLOOD PRESSURE: 104 MMHG | TEMPERATURE: 98.4 F | OXYGEN SATURATION: 96 %

## 2019-05-17 DIAGNOSIS — G56.03 CARPAL TUNNEL SYNDROM,BILATERAL UPPER LIMBS: ICD-10-CM

## 2019-05-17 LAB
BILIRUB UR QL STRIP: NEGATIVE
CLARITY UR: CLEAR
COLLECTION METHOD: NORMAL
GLUCOSE UR-MCNC: NEGATIVE
HCG UR QL: 0.2 EU/DL
HGB UR QL STRIP.AUTO: NEGATIVE
KETONES UR-MCNC: NORMAL
LEUKOCYTE ESTERASE UR QL STRIP: NEGATIVE
NITRITE UR QL STRIP: NEGATIVE
PH UR STRIP: 6
PROT UR STRIP-MCNC: NORMAL
SP GR UR STRIP: 1.02

## 2019-05-17 PROCEDURE — ZZZZZ: CPT

## 2019-05-20 DIAGNOSIS — Z3A.28 28 WEEKS GESTATION OF PREGNANCY: ICD-10-CM

## 2019-05-20 DIAGNOSIS — O09.93 SUPERVISION OF HIGH RISK PREGNANCY, UNSPECIFIED, THIRD TRIMESTER: ICD-10-CM

## 2019-05-20 DIAGNOSIS — G56.03 CARPAL TUNNEL SYNDROME, BILATERAL UPPER LIMBS: ICD-10-CM

## 2019-05-20 DIAGNOSIS — O30.049 TWIN PREGNANCY, DICHORIONIC/DIAMNIOTIC, UNSPECIFIED TRIMESTER: ICD-10-CM

## 2019-05-29 ENCOUNTER — RESULT CHARGE (OUTPATIENT)
Age: 29
End: 2019-05-29

## 2019-05-29 ENCOUNTER — APPOINTMENT (OUTPATIENT)
Dept: ANTEPARTUM | Facility: CLINIC | Age: 29
End: 2019-05-29
Payer: MEDICAID

## 2019-05-29 ENCOUNTER — NON-APPOINTMENT (OUTPATIENT)
Age: 29
End: 2019-05-29

## 2019-05-29 ENCOUNTER — OUTPATIENT (OUTPATIENT)
Dept: OUTPATIENT SERVICES | Facility: HOSPITAL | Age: 29
LOS: 1 days | Discharge: HOME | End: 2019-05-29

## 2019-05-29 VITALS
DIASTOLIC BLOOD PRESSURE: 68 MMHG | BODY MASS INDEX: 48.36 KG/M2 | HEART RATE: 87 BPM | TEMPERATURE: 97.9 F | SYSTOLIC BLOOD PRESSURE: 128 MMHG | OXYGEN SATURATION: 98 % | WEIGHT: 273 LBS

## 2019-05-29 DIAGNOSIS — O99.213 OBESITY COMPLICATING PREGNANCY, THIRD TRIMESTER: ICD-10-CM

## 2019-05-29 DIAGNOSIS — O30.043 TWIN PREGNANCY, DICHORIONIC/DIAMNIOTIC, THIRD TRIMESTER: ICD-10-CM

## 2019-05-29 LAB
ADDL FETAL HEART RATE: 142
ADDL FETAL MOVEMENT: PRESENT
BILIRUB UR QL STRIP: NEGATIVE
CLARITY UR: NORMAL
COLLECTION METHOD: NORMAL
FETAL HEART RATE (BPM): 153
FETAL MOVEMENT: PRESENT
GLUCOSE UR-MCNC: NEGATIVE
HCG UR QL: 0.2 EU/DL
HGB UR QL STRIP.AUTO: NEGATIVE
KETONES UR-MCNC: NEGATIVE
LEUKOCYTE ESTERASE UR QL STRIP: NEGATIVE
NITRITE UR QL STRIP: 1
PH UR STRIP: 6
PROT UR STRIP-MCNC: NEGATIVE
SP GR UR STRIP: 1.03
URINE ALBUMIN/PROTEIN: NEGATIVE
URINE ALBUMIN/PROTEIN: NORMAL
URINE GLUCOSE: NEGATIVE
URINE GLUCOSE: NEGATIVE
URINE KETONES: NEGATIVE
URINE KETONES: NORMAL

## 2019-05-29 PROCEDURE — 76816 OB US FOLLOW-UP PER FETUS: CPT | Mod: 26

## 2019-05-29 PROCEDURE — 76819 FETAL BIOPHYS PROFIL W/O NST: CPT | Mod: 26,59

## 2019-05-29 PROCEDURE — 99214 OFFICE O/P EST MOD 30 MIN: CPT | Mod: 25,TH

## 2019-06-10 ENCOUNTER — RECORD ABSTRACTING (OUTPATIENT)
Age: 29
End: 2019-06-10

## 2019-06-10 DIAGNOSIS — Z84.89 FAMILY HISTORY OF OTHER SPECIFIED CONDITIONS: ICD-10-CM

## 2019-06-17 ENCOUNTER — APPOINTMENT (OUTPATIENT)
Dept: ANTEPARTUM | Facility: CLINIC | Age: 29
End: 2019-06-17
Payer: MEDICAID

## 2019-06-17 ENCOUNTER — NON-APPOINTMENT (OUTPATIENT)
Age: 29
End: 2019-06-17

## 2019-06-17 ENCOUNTER — APPOINTMENT (OUTPATIENT)
Dept: OBGYN | Facility: CLINIC | Age: 29
End: 2019-06-17

## 2019-06-17 ENCOUNTER — RESULT CHARGE (OUTPATIENT)
Age: 29
End: 2019-06-17

## 2019-06-17 ENCOUNTER — OUTPATIENT (OUTPATIENT)
Dept: OUTPATIENT SERVICES | Facility: HOSPITAL | Age: 29
LOS: 1 days | Discharge: HOME | End: 2019-06-17

## 2019-06-17 VITALS
WEIGHT: 279 LBS | SYSTOLIC BLOOD PRESSURE: 120 MMHG | TEMPERATURE: 98.5 F | OXYGEN SATURATION: 98 % | DIASTOLIC BLOOD PRESSURE: 82 MMHG | HEART RATE: 86 BPM | BODY MASS INDEX: 49.42 KG/M2

## 2019-06-17 DIAGNOSIS — O30.009 TWIN PREGNANCY, UNSPECIFIED NUMBER OF PLACENTA AND UNSPECIFIED NUMBER OF AMNIOTIC SACS, UNSPECIFIED TRIMESTER: ICD-10-CM

## 2019-06-17 LAB
ADDL FETAL HEART RATE: 135
ADDL FETAL MOVEMENT: PRESENT
BILIRUB UR QL STRIP: NEGATIVE
CLARITY UR: NORMAL
COLLECTION METHOD: NORMAL
FETAL HEART RATE (BPM): 132
FETAL MOVEMENT: PRESENT
GLUCOSE UR-MCNC: NEGATIVE
HCG UR QL: 8 EU/DL
HGB UR QL STRIP.AUTO: NEGATIVE
KETONES UR-MCNC: NEGATIVE
LEUKOCYTE ESTERASE UR QL STRIP: NORMAL
NITRITE UR QL STRIP: NEGATIVE
PH UR STRIP: 6
PROT UR STRIP-MCNC: NORMAL
SP GR UR STRIP: 1.03
URINE ALBUMIN/PROTEIN: NORMAL
URINE GLUCOSE: NEGATIVE
URINE KETONES: NEGATIVE

## 2019-06-17 PROCEDURE — 76819 FETAL BIOPHYS PROFIL W/O NST: CPT | Mod: 26

## 2019-06-17 PROCEDURE — 76819 FETAL BIOPHYS PROFIL W/O NST: CPT | Mod: 26,59

## 2019-06-17 PROCEDURE — 99214 OFFICE O/P EST MOD 30 MIN: CPT | Mod: 25,TH

## 2019-06-18 LAB
ALBUMIN SERPL ELPH-MCNC: 3.3 G/DL
ALP BLD-CCNC: 98 U/L
ALT SERPL-CCNC: 11 U/L
ANION GAP SERPL CALC-SCNC: 15 MMOL/L
AST SERPL-CCNC: 16 U/L
BASOPHILS # BLD AUTO: 0.02 K/UL
BASOPHILS NFR BLD AUTO: 0.2 %
BILIRUB SERPL-MCNC: 0.4 MG/DL
BUN SERPL-MCNC: <5 MG/DL
CALCIUM SERPL-MCNC: 8.5 MG/DL
CHLORIDE SERPL-SCNC: 101 MMOL/L
CO2 SERPL-SCNC: 20 MMOL/L
CREAT SERPL-MCNC: 0.5 MG/DL
EOSINOPHIL # BLD AUTO: 0.12 K/UL
EOSINOPHIL NFR BLD AUTO: 1.4 %
GLUCOSE 1H P 50 G GLC PO SERPL-MCNC: 137 MG/DL
GLUCOSE SERPL-MCNC: 139 MG/DL
HCT VFR BLD CALC: 32.4 %
HGB BLD-MCNC: 10.6 G/DL
HIV1+2 AB SPEC QL IA.RAPID: NONREACTIVE
IMM GRANULOCYTES NFR BLD AUTO: 1.6 %
LYMPHOCYTES # BLD AUTO: 1.65 K/UL
LYMPHOCYTES NFR BLD AUTO: 18.6 %
MAN DIFF?: NORMAL
MCHC RBC-ENTMCNC: 29.4 PG
MCHC RBC-ENTMCNC: 32.7 G/DL
MCV RBC AUTO: 90 FL
MONOCYTES # BLD AUTO: 0.7 K/UL
MONOCYTES NFR BLD AUTO: 7.9 %
NEUTROPHILS # BLD AUTO: 6.23 K/UL
NEUTROPHILS NFR BLD AUTO: 70.3 %
PLATELET # BLD AUTO: 219 K/UL
POTASSIUM SERPL-SCNC: 3.4 MMOL/L
PROT SERPL-MCNC: 5.9 G/DL
RBC # BLD: 3.6 M/UL
RBC # FLD: 14.2 %
SODIUM SERPL-SCNC: 136 MMOL/L
WBC # FLD AUTO: 8.86 K/UL

## 2019-06-19 LAB
MEV IGG FLD QL IA: 72.2 AU/ML
MEV IGG+IGM SER-IMP: POSITIVE

## 2019-06-20 NOTE — ED PROVIDER NOTE - MUSCULOSKELETAL, MLM
Over the last 2 weeks, how often have you been bothered by the following problems?          PHQ2 Score:  0  1. Little interest or pleasure in doing things?:  0  2. Feeling down, depressed, or hopeless?:  0          Spine appears normal, range of motion is not limited, no muscle or joint tenderness

## 2019-06-24 ENCOUNTER — APPOINTMENT (OUTPATIENT)
Dept: ANTEPARTUM | Facility: CLINIC | Age: 29
End: 2019-06-24

## 2019-06-25 ENCOUNTER — OUTPATIENT (OUTPATIENT)
Dept: OUTPATIENT SERVICES | Facility: HOSPITAL | Age: 29
LOS: 1 days | Discharge: HOME | End: 2019-06-25
Payer: MEDICAID

## 2019-06-25 VITALS
RESPIRATION RATE: 18 BRPM | TEMPERATURE: 98 F | SYSTOLIC BLOOD PRESSURE: 129 MMHG | HEART RATE: 93 BPM | DIASTOLIC BLOOD PRESSURE: 71 MMHG

## 2019-06-25 VITALS — SYSTOLIC BLOOD PRESSURE: 116 MMHG | HEART RATE: 88 BPM | DIASTOLIC BLOOD PRESSURE: 68 MMHG

## 2019-06-25 DIAGNOSIS — Z98.890 OTHER SPECIFIED POSTPROCEDURAL STATES: Chronic | ICD-10-CM

## 2019-06-25 DIAGNOSIS — G56.03 CARPAL TUNNEL SYNDROME, BILATERAL UPPER LIMBS: ICD-10-CM

## 2019-06-25 DIAGNOSIS — O99.213 OBESITY COMPLICATING PREGNANCY, THIRD TRIMESTER: ICD-10-CM

## 2019-06-25 LAB
APPEARANCE UR: ABNORMAL
BACTERIA # UR AUTO: ABNORMAL /HPF
BASOPHILS # BLD AUTO: 0.02 K/UL — SIGNIFICANT CHANGE UP (ref 0–0.2)
BASOPHILS NFR BLD AUTO: 0.2 % — SIGNIFICANT CHANGE UP (ref 0–1)
BILIRUB UR-MCNC: ABNORMAL
C TRACH RRNA SPEC QL NAA+PROBE: SIGNIFICANT CHANGE UP
COLOR SPEC: SIGNIFICANT CHANGE UP
DIFF PNL FLD: NEGATIVE — SIGNIFICANT CHANGE UP
EOSINOPHIL # BLD AUTO: 0.11 K/UL — SIGNIFICANT CHANGE UP (ref 0–0.7)
EOSINOPHIL NFR BLD AUTO: 1.2 % — SIGNIFICANT CHANGE UP (ref 0–8)
EPI CELLS # UR: ABNORMAL /HPF
GLUCOSE UR QL: NEGATIVE MG/DL — SIGNIFICANT CHANGE UP
HCT VFR BLD CALC: 32 % — LOW (ref 37–47)
HGB BLD-MCNC: 10.6 G/DL — LOW (ref 12–16)
IMM GRANULOCYTES NFR BLD AUTO: 1.4 % — HIGH (ref 0.1–0.3)
KETONES UR-MCNC: 40
LEUKOCYTE ESTERASE UR-ACNC: NEGATIVE — SIGNIFICANT CHANGE UP
LYMPHOCYTES # BLD AUTO: 1.86 K/UL — SIGNIFICANT CHANGE UP (ref 1.2–3.4)
LYMPHOCYTES # BLD AUTO: 19.6 % — LOW (ref 20.5–51.1)
MCHC RBC-ENTMCNC: 30 PG — SIGNIFICANT CHANGE UP (ref 27–31)
MCHC RBC-ENTMCNC: 33.1 G/DL — SIGNIFICANT CHANGE UP (ref 32–37)
MCV RBC AUTO: 90.7 FL — SIGNIFICANT CHANGE UP (ref 81–99)
MONOCYTES # BLD AUTO: 0.81 K/UL — HIGH (ref 0.1–0.6)
MONOCYTES NFR BLD AUTO: 8.6 % — SIGNIFICANT CHANGE UP (ref 1.7–9.3)
N GONORRHOEA RRNA SPEC QL NAA+PROBE: SIGNIFICANT CHANGE UP
NEUTROPHILS # BLD AUTO: 6.54 K/UL — HIGH (ref 1.4–6.5)
NEUTROPHILS NFR BLD AUTO: 69 % — SIGNIFICANT CHANGE UP (ref 42.2–75.2)
NITRITE UR-MCNC: NEGATIVE — SIGNIFICANT CHANGE UP
NRBC # BLD: 0 /100 WBCS — SIGNIFICANT CHANGE UP (ref 0–0)
PH UR: 6 — SIGNIFICANT CHANGE UP (ref 5–8)
PLATELET # BLD AUTO: 211 K/UL — SIGNIFICANT CHANGE UP (ref 130–400)
PROT UR-MCNC: ABNORMAL MG/DL
RBC # BLD: 3.53 M/UL — LOW (ref 4.2–5.4)
RBC # FLD: 14 % — SIGNIFICANT CHANGE UP (ref 11.5–14.5)
SP GR SPEC: 1.02 — SIGNIFICANT CHANGE UP (ref 1.01–1.03)
SPECIMEN SOURCE: SIGNIFICANT CHANGE UP
UROBILINOGEN FLD QL: 1 MG/DL (ref 0.2–0.2)
WBC # BLD: 9.47 K/UL — SIGNIFICANT CHANGE UP (ref 4.8–10.8)
WBC # FLD AUTO: 9.47 K/UL — SIGNIFICANT CHANGE UP (ref 4.8–10.8)
WBC UR QL: SIGNIFICANT CHANGE UP /HPF

## 2019-06-25 PROCEDURE — 99213 OFFICE O/P EST LOW 20 MIN: CPT | Mod: 25,TH

## 2019-06-25 PROCEDURE — 59025 FETAL NON-STRESS TEST: CPT | Mod: 26

## 2019-06-25 RX ORDER — SODIUM CHLORIDE 9 MG/ML
1000 INJECTION, SOLUTION INTRAVENOUS
Refills: 0 | Status: DISCONTINUED | OUTPATIENT
Start: 2019-06-25 | End: 2019-07-10

## 2019-06-25 NOTE — PROGRESS NOTE ADULT - SUBJECTIVE AND OBJECTIVE BOX
LEAH PGY2 Note:     Patient seen and examined at bedside. Comfortable, ctx have spaced out and become less painful     T(C): 36.9 (06-25-19 @ 01:33), Max: 36.9 (06-25-19 @ 01:33)  HR: 88 (06-25-19 @ 04:33) (80 - 93)  BP: 116/68 (06-25-19 @ 04:33) (109/67 - 129/71)  RR: 18 (06-25-19 @ 01:33) (18 - 18)    EFM: Twin A: 135/mod/accels+; Twin B: 120/mod/accels+  [place]: q7-11min  SVE: C/L/P    Meds: lactated ringers. 1000 milliLiter(s) IV Continuous <Continuous>      Labs:                        10.6   9.47  )-----------( 211      ( 25 Jun 2019 03:08 )             32.0

## 2019-06-25 NOTE — OB PROVIDER TRIAGE NOTE - PSH
Delivery by  section of full-term infant    H/O cone biopsy of cervix    History of D&C  dilation and evacuation

## 2019-06-25 NOTE — PROGRESS NOTE ADULT - ASSESSMENT
30yo  at 33w4d GA, naturally conceived di/di twins, late transfer from [facility] at 21+ weeks, h/o previous C/S x2 for repeat  section, h/o HSV1, h/o infant demise at 4mo GA, with carpal tunnel syndrome; BPP 8/8x2, reassuring fetal and maternal status, not in  labor   - d/c home   - UA, Ucx, GBS, GC/Cl, CBC   -  labor precautions/FKC   - ambulation/PO hydration  - f/u next scheduled appt   - wrist splints   - valtrex for suppression from 36w GA     Dr. Tinoco and Dr. Fernandez aware.

## 2019-06-25 NOTE — OB PROVIDER TRIAGE NOTE - FAMILY HISTORY
Family history of pancreatic cancer, uncle     Father  Still living? Unknown  Family history of hypertension, Age at diagnosis: Age Unknown  Family history of stomach cancer, Age at diagnosis: Age Unknown

## 2019-06-25 NOTE — OB PROVIDER TRIAGE NOTE - HISTORY OF PRESENT ILLNESS
30yo  at 33w4d GA, by LMP c/w 1st trim sono, followed by [facility] for naturally conceived di/di twins, late transfer from [facility], c/o ctx X 1 week, q5-15min, not requiring pain medication. Denies incisional pain, VB/LOF. Earlier complained of decreased FM; but now feels good FM x2. Denies fever, chills, n/v/d, dysuria, urgency, frequency. Denies dizziness/lightheadedness/CP/SOB/palpitations. H/o preeclampsia in previous 2 pregnancies; started on ASA- last taken 1 week prior. Currently denies headache, blurred vision, RUQ/epigastric pain, new onset swelling. Denies elevated BPs during this pregnancy. Last PO intake few hours prior. Last intercourse this AM. Last BM yesterday. H/o previous C/S x2 for repeat  section. H/o HSV1- last outbreak 8 years ago-currently denies vaginal burning/itching/lesions. H/o NND at 4mo GA. C/o occasional B/L upper extremity tingling/numbness; diagnosed with carpal tunnel syndrome; has not used wrist splints. Denies other complications during this pregnancy. 28yo  at 33w4d GA, by LMP c/w 1st trim sono, followed by [facility] for naturally conceived di/di twins, late transfer from [facility] at 21+ weeks, c/o ctx X 1 week, q5-15min, not requiring pain medication. Denies incisional pain, VB/LOF. Earlier complained of decreased FM; but now feels good FM x2. Denies fever, chills, n/v/d, dysuria, urgency, frequency. Denies dizziness/lightheadedness/CP/SOB/palpitations. H/o PIH in previous 2 pregnancies; started on ASA- last taken 1 week prior. Currently denies headache, blurred vision, RUQ/epigastric pain, new onset swelling. Denies elevated BPs during this pregnancy. Last PO intake few hours prior. Last intercourse this AM. Last BM yesterday. H/o previous C/S x2 for repeat  section. H/o HSV1- last outbreak 8 years ago-currently denies vaginal burning/itching/lesions. H/o infant demise at 4mo GA. C/o occasional B/L upper extremity tingling/numbness; diagnosed with carpal tunnel syndrome; has not used wrist splints. Denies other complications during this pregnancy.

## 2019-06-25 NOTE — OB PROVIDER TRIAGE NOTE - NS_OBGYNHISTORY_OBGYN_ALL_OB_FT
ObHx:   2006- FT ; unknown birth weight; NND at 4mo GA   2008- FT ; [id number]   2010- FT C/S for macrosomia; [id number]- [facility]   2013- FT C/S (repeat); [id number]- [facility]   SABx1, D&E <20w GA     GynHx: h/o HSV; h/o cone biopsy for abnormal pap smear; denies h/o fibroids, ovarian cysts, other STIs. ObHx:   2006- FT ; unknown birth weight; infant demise at 4mo GA   2008- FT ; [id number]   2010- FT C/S for macrosomia; [id number]- [facility]   2013- FT C/S (repeat); [id number]- [facility]   SABx1, D&E <20w GA     GynHx: h/o HSV; h/o cone biopsy for abnormal pap smear; denies h/o fibroids, ovarian cysts, other STIs.

## 2019-06-25 NOTE — OB PROVIDER TRIAGE NOTE - NSOBPROVIDERNOTE_OBGYN_ALL_OB_FT
30yo  at 33w4d GA, naturally conceived di/di twins, late transfer from [facility] at 21+ weeks, h/o previous C/S x2 for repeat  section, h/o HSV1, h/o infant demise at 4mo GA, with carpal tunnel syndrome; BPP 8/8x2, reassuring fetal and maternal status, not in  labor   - d/c home   - UA, Ucx, GBS, GC/Cl, CBC   -  labor precautions/FKC   - ambulation/PO hydration  - f/u next scheduled appt     Dr. Fernandez and Dr. Tinoco aware. 28yo  at 33w4d GA, naturally conceived di/di twins, late transfer from [facility] at 21+ weeks, h/o previous C/S x2 for repeat  section, h/o HSV1, h/o infant demise at 4mo GA, with carpal tunnel syndrome; BPP 8/8x2, reassuring fetal and maternal status, not in  labor   - transfer to recovery   - UA, Ucx, GBS, GC/Cl, CBC   - continuous EFM/toco  - re-examine in 2 hours     Dr. Fernandez and Dr. Tinoco aware.

## 2019-06-25 NOTE — OB PROVIDER TRIAGE NOTE - NSHPLABSRESULTS_GEN_ALL_CORE
ega 21.3w- Twin A: efw 529g; cephalic, 2vc, post placenta, MVP 4.7cm; normal anatomy   Twin B: efw 513g; transverse, 3vc, post placenta, MVP 4.7cm; normal anatomy   ega 26.4w- Twin A: efw 935g, complete breech, post placenta, MVP 4.9cm; normal anatomy   Twin B: efw 989g (53%), cephalic, post placenta, MVP 4.8cm, normal anatomy   ega 29.5w- Twin A: 1540g (57%) breech, post placenta, MVP 5.3cm; Twin B: 1684g (66%), breech, post placenta, MVP 5.2cm   ega 32.3w- Twin A: cephalic, post placenta, MVP 5.3cm; BPP 8/8; Twin B: incomplete breech, post placenta, MVP 4.8cm; BPP 8/8     FETAL ECHO- normal     early ;       3/27/19   O pos   HbsAg NR; HIV NR; RPR NR   Rubella/varicella immune     6/17/19   HIV NR   measles immune    3/27/19 PELS WNL   6/17/19 PELS WNL

## 2019-06-25 NOTE — OB PROVIDER TRIAGE NOTE - NSHPPHYSICALEXAM_GEN_ALL_CORE
Vital Signs Last 24 Hrs  T(C): 36.9 (25 Jun 2019 01:33), Max: 36.9 (25 Jun 2019 01:33)  T(F): 98.4 (25 Jun 2019 01:33), Max: 98.4 (25 Jun 2019 01:33)  HR: 86 (25 Jun 2019 04:03) (80 - 93)  BP: 109/67 (25 Jun 2019 04:03) (109/67 - 129/71)  RR: 18 (25 Jun 2019 01:33) (18 - 18)     EFM: Baby A: 120/mod/accels+; Baby B: 135/mod/accels+  [place]: q7-10min   Abd: soft, gravid, nontender, no incisional tenderness; occ palpable ctx  Speculum: no lesions; no active bleeding per os, cervix appears normal   SVE: C/L/P, vtx/breech by sono, intact     Gen: NAD, AAO X 3  Heart: RRR, S1S2+  Lungs: CTA B/L, no r/r/w   Ext: 1+ B/L pitting edema; no calf tenderness; 2+ DTRs UE/LE B/L

## 2019-06-26 ENCOUNTER — OUTPATIENT (OUTPATIENT)
Dept: OUTPATIENT SERVICES | Facility: HOSPITAL | Age: 29
LOS: 1 days | Discharge: HOME | End: 2019-06-26

## 2019-06-26 ENCOUNTER — APPOINTMENT (OUTPATIENT)
Dept: ANTEPARTUM | Facility: CLINIC | Age: 29
End: 2019-06-26
Payer: MEDICAID

## 2019-06-26 DIAGNOSIS — Z98.890 OTHER SPECIFIED POSTPROCEDURAL STATES: Chronic | ICD-10-CM

## 2019-06-26 LAB
CULTURE RESULTS: SIGNIFICANT CHANGE UP
GROUP B BETA STREP DNA (PCR): DETECTED
GROUP B BETA STREP INTERPRETATION: SIGNIFICANT CHANGE UP
SOURCE GROUP B STREP: SIGNIFICANT CHANGE UP
SPECIMEN SOURCE: SIGNIFICANT CHANGE UP

## 2019-06-26 PROCEDURE — 76816 OB US FOLLOW-UP PER FETUS: CPT | Mod: 26,59

## 2019-06-26 PROCEDURE — 76819 FETAL BIOPHYS PROFIL W/O NST: CPT | Mod: 26

## 2019-06-26 PROCEDURE — 76819 FETAL BIOPHYS PROFIL W/O NST: CPT | Mod: 26,59

## 2019-06-26 PROCEDURE — 76816 OB US FOLLOW-UP PER FETUS: CPT | Mod: 26

## 2019-07-01 ENCOUNTER — OUTPATIENT (OUTPATIENT)
Dept: OUTPATIENT SERVICES | Facility: HOSPITAL | Age: 29
LOS: 1 days | End: 2019-07-01
Payer: MEDICAID

## 2019-07-01 ENCOUNTER — NON-APPOINTMENT (OUTPATIENT)
Age: 29
End: 2019-07-01

## 2019-07-01 ENCOUNTER — APPOINTMENT (OUTPATIENT)
Dept: ANTEPARTUM | Facility: CLINIC | Age: 29
End: 2019-07-01
Payer: MEDICAID

## 2019-07-01 ENCOUNTER — APPOINTMENT (OUTPATIENT)
Dept: OBGYN | Facility: CLINIC | Age: 29
End: 2019-07-01

## 2019-07-01 ENCOUNTER — OUTPATIENT (OUTPATIENT)
Dept: OUTPATIENT SERVICES | Facility: HOSPITAL | Age: 29
LOS: 1 days | Discharge: HOME | End: 2019-07-01

## 2019-07-01 VITALS
DIASTOLIC BLOOD PRESSURE: 90 MMHG | OXYGEN SATURATION: 100 % | HEART RATE: 101 BPM | BODY MASS INDEX: 50.66 KG/M2 | WEIGHT: 286 LBS | SYSTOLIC BLOOD PRESSURE: 161 MMHG | TEMPERATURE: 98.4 F

## 2019-07-01 VITALS — SYSTOLIC BLOOD PRESSURE: 135 MMHG | DIASTOLIC BLOOD PRESSURE: 89 MMHG

## 2019-07-01 DIAGNOSIS — O30.049 TWIN PREGNANCY, DICHORIONIC/DIAMNIOTIC, UNSPECIFIED TRIMESTER: ICD-10-CM

## 2019-07-01 DIAGNOSIS — Z98.890 OTHER SPECIFIED POSTPROCEDURAL STATES: Chronic | ICD-10-CM

## 2019-07-01 DIAGNOSIS — O09.93 SUPERVISION OF HIGH RISK PREGNANCY, UNSPECIFIED, THIRD TRIMESTER: ICD-10-CM

## 2019-07-01 DIAGNOSIS — Z3A.33 33 WEEKS GESTATION OF PREGNANCY: ICD-10-CM

## 2019-07-01 LAB
BILIRUB UR QL STRIP: NORMAL
CLARITY UR: CLEAR
COLLECTION METHOD: NORMAL
GLUCOSE UR-MCNC: NORMAL
HCG UR QL: 0.2 EU/DL
HGB UR QL STRIP.AUTO: NORMAL
KETONES UR-MCNC: NORMAL
LEUKOCYTE ESTERASE UR QL STRIP: NORMAL
NITRITE UR QL STRIP: NORMAL
PH UR STRIP: 6.5
PROT UR STRIP-MCNC: NORMAL
SP GR UR STRIP: 1.01

## 2019-07-01 PROCEDURE — 99214 OFFICE O/P EST MOD 30 MIN: CPT | Mod: 25,TH

## 2019-07-01 PROCEDURE — 76819 FETAL BIOPHYS PROFIL W/O NST: CPT | Mod: 26

## 2019-07-01 PROCEDURE — G9001: CPT

## 2019-07-01 PROCEDURE — 76819 FETAL BIOPHYS PROFIL W/O NST: CPT | Mod: 26,59

## 2019-07-02 DIAGNOSIS — O13.9 GESTATIONAL [PREGNANCY-INDUCED] HYPERTENSION WITHOUT SIGNIFICANT PROTEINURIA, UNSPECIFIED TRIMESTER: ICD-10-CM

## 2019-07-02 LAB
C TRACH RRNA SPEC QL NAA+PROBE: NOT DETECTED
N GONORRHOEA RRNA SPEC QL NAA+PROBE: NOT DETECTED
SOURCE AMPLIFICATION: NORMAL

## 2019-07-03 ENCOUNTER — INPATIENT (INPATIENT)
Facility: HOSPITAL | Age: 29
LOS: 3 days | Discharge: HOME HEALTH | End: 2019-07-07
Attending: OBSTETRICS & GYNECOLOGY | Admitting: OBSTETRICS & GYNECOLOGY
Payer: MEDICAID

## 2019-07-03 VITALS — DIASTOLIC BLOOD PRESSURE: 74 MMHG | HEART RATE: 100 BPM | SYSTOLIC BLOOD PRESSURE: 134 MMHG

## 2019-07-03 DIAGNOSIS — Z98.890 OTHER SPECIFIED POSTPROCEDURAL STATES: Chronic | ICD-10-CM

## 2019-07-03 LAB
ALBUMIN SERPL ELPH-MCNC: 3.3 G/DL — LOW (ref 3.5–5.2)
ALP SERPL-CCNC: 137 U/L — HIGH (ref 30–115)
ALT FLD-CCNC: 15 U/L — SIGNIFICANT CHANGE UP (ref 0–41)
ANION GAP SERPL CALC-SCNC: 15 MMOL/L — HIGH (ref 7–14)
AST SERPL-CCNC: 18 U/L — SIGNIFICANT CHANGE UP (ref 0–41)
BASOPHILS # BLD AUTO: 0.02 K/UL — SIGNIFICANT CHANGE UP (ref 0–0.2)
BASOPHILS NFR BLD AUTO: 0.2 % — SIGNIFICANT CHANGE UP (ref 0–1)
BILIRUB SERPL-MCNC: 0.6 MG/DL — SIGNIFICANT CHANGE UP (ref 0.2–1.2)
BUN SERPL-MCNC: <3 MG/DL — LOW (ref 10–20)
CALCIUM SERPL-MCNC: 9.1 MG/DL — SIGNIFICANT CHANGE UP (ref 8.5–10.1)
CHLORIDE SERPL-SCNC: 98 MMOL/L — SIGNIFICANT CHANGE UP (ref 98–110)
CO2 SERPL-SCNC: 22 MMOL/L — SIGNIFICANT CHANGE UP (ref 17–32)
CREAT ?TM UR-MCNC: 152 MG/DL — SIGNIFICANT CHANGE UP
CREAT SERPL-MCNC: 0.5 MG/DL — LOW (ref 0.7–1.5)
EOSINOPHIL # BLD AUTO: 0.09 K/UL — SIGNIFICANT CHANGE UP (ref 0–0.7)
EOSINOPHIL NFR BLD AUTO: 0.9 % — SIGNIFICANT CHANGE UP (ref 0–8)
GLUCOSE SERPL-MCNC: 68 MG/DL — LOW (ref 70–99)
HCT VFR BLD CALC: 34.5 % — LOW (ref 37–47)
HGB BLD-MCNC: 11.4 G/DL — LOW (ref 12–16)
IMM GRANULOCYTES NFR BLD AUTO: 0.8 % — HIGH (ref 0.1–0.3)
LDH SERPL L TO P-CCNC: 168 — SIGNIFICANT CHANGE UP (ref 50–242)
LYMPHOCYTES # BLD AUTO: 1.42 K/UL — SIGNIFICANT CHANGE UP (ref 1.2–3.4)
LYMPHOCYTES # BLD AUTO: 14.7 % — LOW (ref 20.5–51.1)
MCHC RBC-ENTMCNC: 29.7 PG — SIGNIFICANT CHANGE UP (ref 27–31)
MCHC RBC-ENTMCNC: 33 G/DL — SIGNIFICANT CHANGE UP (ref 32–37)
MCV RBC AUTO: 89.8 FL — SIGNIFICANT CHANGE UP (ref 81–99)
MONOCYTES # BLD AUTO: 0.73 K/UL — HIGH (ref 0.1–0.6)
MONOCYTES NFR BLD AUTO: 7.6 % — SIGNIFICANT CHANGE UP (ref 1.7–9.3)
NEUTROPHILS # BLD AUTO: 7.29 K/UL — HIGH (ref 1.4–6.5)
NEUTROPHILS NFR BLD AUTO: 75.8 % — HIGH (ref 42.2–75.2)
NRBC # BLD: 0 /100 WBCS — SIGNIFICANT CHANGE UP (ref 0–0)
PLATELET # BLD AUTO: 219 K/UL — SIGNIFICANT CHANGE UP (ref 130–400)
POTASSIUM SERPL-MCNC: 3.5 MMOL/L — SIGNIFICANT CHANGE UP (ref 3.5–5)
POTASSIUM SERPL-SCNC: 3.5 MMOL/L — SIGNIFICANT CHANGE UP (ref 3.5–5)
PROT ?TM UR-MCNC: 46 MG/DLG/24H — SIGNIFICANT CHANGE UP
PROT SERPL-MCNC: 6.4 G/DL — SIGNIFICANT CHANGE UP (ref 6–8)
PROT/CREAT UR-RTO: 0.3 RATIO — HIGH (ref 0–0.2)
RBC # BLD: 3.84 M/UL — LOW (ref 4.2–5.4)
RBC # FLD: 13.7 % — SIGNIFICANT CHANGE UP (ref 11.5–14.5)
SODIUM SERPL-SCNC: 135 MMOL/L — SIGNIFICANT CHANGE UP (ref 135–146)
URATE SERPL-MCNC: 4.7 MG/DL — SIGNIFICANT CHANGE UP (ref 2.5–7)
WBC # BLD: 9.63 K/UL — SIGNIFICANT CHANGE UP (ref 4.8–10.8)
WBC # FLD AUTO: 9.63 K/UL — SIGNIFICANT CHANGE UP (ref 4.8–10.8)

## 2019-07-03 RX ORDER — ACETAMINOPHEN 500 MG
1000 TABLET ORAL ONCE
Refills: 0 | Status: COMPLETED | OUTPATIENT
Start: 2019-07-03 | End: 2019-07-03

## 2019-07-03 RX ORDER — SODIUM CHLORIDE 9 MG/ML
1000 INJECTION, SOLUTION INTRAVENOUS
Refills: 0 | Status: DISCONTINUED | OUTPATIENT
Start: 2019-07-03 | End: 2019-07-04

## 2019-07-03 RX ADMIN — Medication 400 MILLIGRAM(S): at 23:04

## 2019-07-03 NOTE — OB PROVIDER TRIAGE NOTE - NSHPPHYSICALEXAM_GEN_ALL_CORE
Physical exam:    Vital Signs Last 24 Hrs  T(F): 99.7 (03 Jul 2019 16:06), Max: 99.7 (03 Jul 2019 16:06)  HR: 83 (03 Jul 2019 17:21) (83 - 100)  BP: 112/72 (03 Jul 2019 17:21) (112/72 - 139/86)  RR: 19 (03 Jul 2019 16:06) (19 - 19)  Udip large ketones, mod leuks  Gen: NAD  Resp: CTA b/l  Cardio: RRR  Abdomen: Soft, nontender, gravid. no RUQ or epigastric tenderness.   Neuro: +2 UE reflexes b/l  Ext: +2 pedal edema b/l. No calf tenderness or erythema.  VE:  EFM:   toco: Physical exam:    Vital Signs Last 24 Hrs  T(F): 99.7 (03 Jul 2019 16:06), Max: 99.7 (03 Jul 2019 16:06)  HR: 83 (03 Jul 2019 17:21) (83 - 100)  BP: 112/72 (03 Jul 2019 17:21) (112/72 - 139/86)  RR: 19 (03 Jul 2019 16:06) (19 - 19)  Udip large ketones, mod leuks  Gen: NAD  Resp: CTA b/l  Cardio: RRR  Abdomen: Soft, nontender, gravid. no RUQ or epigastric tenderness.   Neuro: +2 UE reflexes b/l  Ext: +2 pedal edema b/l. No calf tenderness or erythema.  VE: 1/L/-3  EFM: babyA 130/mod/+accels, baby B 135/mod/+accels  toco: q 5min Physical exam:  Vital Signs Last 24 Hrs  T(F): 99.7 (03 Jul 2019 16:06), Max: 99.7 (03 Jul 2019 16:06)  HR: 83 (03 Jul 2019 17:21) (83 - 100)  BP: 112/72 (03 Jul 2019 17:21) (112/72 - 139/86)  RR: 19 (03 Jul 2019 16:06) (19 - 19)  Udip large ketones, mod leuks  Gen: NAD  Resp: CTA b/l  Cardio: RRR  Abdomen: Soft, nontender, gravid. no RUQ or epigastric tenderness, no palpable ctx  Neuro: +2 UE reflexes b/l  Ext: +2 pedal edema b/l. No calf tenderness or erythema.  VE: 1/L/-3, no lesions on speculum, PRESENTATION  EFM: baby A 130/mod/+accels, baby B 135/mod/+accels  toco: q 5min Physical exam:  Vital Signs Last 24 Hrs  T(F): 99.7 (03 Jul 2019 16:06), Max: 99.7 (03 Jul 2019 16:06)  HR: 83 (03 Jul 2019 17:21) (83 - 100)  BP: 112/72 (03 Jul 2019 17:21) (112/72 - 139/86)  RR: 19 (03 Jul 2019 16:06) (19 - 19)  Udip large ketones, mod leuks  Gen: NAD  Resp: CTA b/l  Cardio: RRR  Abdomen: Soft, nontender, gravid. no RUQ or epigastric tenderness, no palpable ctx  Neuro: +2 UE reflexes b/l  Ext: +2 pedal edema b/l. No calf tenderness or erythema.  VE: 1/L/-3, no lesions on speculum, babyA vertex by sono, babyB  speculum: cervix appears closed. no lesions, no bleeding or blood noted  EFM: baby A 130/mod/+accels, baby B 135/mod/+accels  toco: q 5min  Sono:   Twin A: vertex, post MVP 3.7cm, BPP 8/8   Twin B: complete breech, post placenta 4.98cm, BPP 8/8

## 2019-07-03 NOTE — OB PROVIDER TRIAGE NOTE - NSHPLABSRESULTS_GEN_ALL_CORE
ega 21.3w- Twin A: efw 529g; cephalic, 2vc, post placenta, MVP 4.7cm; normal anatomy   	Twin B: efw 513g; transverse, 3vc, post placenta, MVP 4.7cm; normal anatomy   ega 26.4w- Twin A: efw 935g, complete breech, post placenta, MVP 4.9cm; normal anatomy   	Twin B: efw 989g (53%), cephalic, post placenta, MVP 4.8cm, normal anatomy   ega 29.5w- Twin A: 1540g (57%) breech, post placenta, MVP 5.3cm; Twin B: 1684g (66%), breech, post placenta, MVP 5.2cm   ega 32.3w- Twin A: cephalic, post placenta, MVP 5.3cm; BPP 8/8; Twin B: incomplete breech, post placenta, MVP 4.8cm; BPP 8/8   33.5w- Twin A cephali,c EFW 2314g (49%), ant placenta, MVP 55mm  Twin B EFW 2388g (53%), breech, post placenta, MVP A 55mm, MVP B 55mm, BPP A 8/8, BPP B 8/8  34.3w- TwinA cephalic, post placenta, MVP 40mm,  BPP 8/8            TwinB incomplete breech, pos placenta, MVP 52mm, BPP 8/8      FETAL ECHO- normal     early ;       3/27/19   	O pos   	HbsAg NR; HIV NR; RPR NR   	Rubella/varicella immune     	6/17/19   	HIV NR   	measles immune    3/27/19 PELS WNL   6/17/19 PELS WNL ega 21.3w- Twin A: efw 529g; cephalic, 2vc, post placenta, MVP 4.7cm; normal anatomy   	Twin B: efw 513g; transverse, 3vc, post placenta, MVP 4.7cm; normal anatomy   ega 26.4w- Twin A: efw 935g, complete breech, post placenta, MVP 4.9cm; normal anatomy   	Twin B: efw 989g (53%), cephalic, post placenta, MVP 4.8cm, normal anatomy   ega 29.5w- Twin A: 1540g (57%) breech, post placenta, MVP 5.3cm; Twin B: 1684g (66%), breech, post placenta, MVP 5.2cm   ega 32.3w- Twin A: cephalic, post placenta, MVP 5.3cm; BPP 8/8; Twin B: incomplete breech, post placenta, MVP 4.8cm; BPP 8/8   33.5w- Twin A cephali,c EFW 2314g (49%), ant placenta, MVP 55mm  Twin B EFW 2388g (53%), breech, post placenta, MVP A 55mm, MVP B 55mm, BPP A 8/8, BPP B 8/8, weight discordance 3%  34.3w- TwinA cephalic, post placenta, MVP 40mm,  BPP 8/8            TwinB incomplete breech, pos placenta, MVP 52mm, BPP 8/8      FETAL ECHO baby A for 2VC- normal     early ;       3/27/19   	O pos   	HbsAg NR; HIV NR; RPR NR   	Rubella/varicella immune     	6/17/19   	HIV NR   	measles immune    3/27/19 PELS WNL   6/17/19 PELS WNL SONO AT BEDSIDE:   Twin A: vertex, MVP 2.81cm, BPP 8/8   Twin B: complete breech, 4.98cm, BPP 8/8    EGA 21.3w- Twin A: efw 529g; cephalic, 2vc, post placenta, MVP 4.7cm; normal anatomy   	Twin B: efw 513g; transverse, 3vc, post placenta, MVP 4.7cm; normal anatomy   ega 26.4w- Twin A: efw 935g, complete breech, post placenta, MVP 4.9cm; normal anatomy   	Twin B: efw 989g (53%), cephalic, post placenta, MVP 4.8cm, normal anatomy   ega 29.5w- Twin A: 1540g (57%) breech, post placenta, MVP 5.3cm; Twin B: 1684g (66%), breech, post placenta, MVP 5.2cm   ega 32.3w- Twin A: cephalic, post placenta, MVP 5.3cm; BPP 8/8; Twin B: incomplete breech, post placenta, MVP 4.8cm; BPP 8/8   33.5w- Twin A cephali,c EFW 2314g (49%), ant placenta, MVP 55mm  Twin B EFW 2388g (53%), breech, post placenta, MVP A 55mm, MVP B 55mm, BPP A 8/8, BPP B 8/8, weight discordance 3%  34.3w- TwinA cephalic, post placenta, MVP 40mm,  BPP 8/8            TwinB incomplete breech, pos placenta, MVP 52mm, BPP 8/8      FETAL ECHO baby A for 2VC- normal     early ;       3/27/19   	O pos   	HbsAg NR; HIV NR; RPR NR   	Rubella/varicella immune     	6/17/19   	HIV NR   	measles immune    3/27/19 PELS WNL   6/17/19 PELS WNL 7w1d- di/di gestation, twin A CRL 8.29mm, FH 156bpm smallsubchorionic bleed; Twin B CRL 10mm, FH 144bpm  EGA 21.3w- Twin A: efw 529g; cephalic, 2vc, post placenta, MVP 4.7cm; normal anatomy   	Twin B: efw 513g; transverse, 3vc, post placenta, MVP 4.7cm; normal anatomy   ega 26.4w- Twin A: efw 935g, complete breech, post placenta, MVP 4.9cm; normal anatomy   	Twin B: efw 989g (53%), cephalic, post placenta, MVP 4.8cm, normal anatomy   ega 29.5w- Twin A: 1540g (57%) breech, post placenta, MVP 5.3cm; Twin B: 1684g (66%), breech, post placenta, MVP 5.2cm   ega 32.3w- Twin A: cephalic, post placenta, MVP 5.3cm; BPP 8/8; Twin B: incomplete breech, post placenta, MVP 4.8cm; BPP 8/8   33.5w- Twin A cephali,c EFW 2314g (49%), ant placenta, MVP 55mm  Twin B EFW 2388g (53%), breech, post placenta, MVP A 55mm, MVP B 55mm, BPP A 8/8, BPP B 8/8, weight discordance 3%  34.3w- TwinA cephalic, post placenta, MVP 40mm,  BPP 8/8            TwinB incomplete breech, pos placenta, MVP 52mm, BPP 8/8      FETAL ECHO baby A for 2VC- normal     early ;       3/27/19   	O pos   	HbsAg NR; HIV NR; RPR NR   	Rubella/varicella immune     	6/17/19   	HIV NR   	measles immune    3/27/19 PELS WNL   6/17/19 PELS WNL

## 2019-07-03 NOTE — OB PROVIDER TRIAGE NOTE - NS_SONONOTE_OBGYN_ALL_OB_FT
Twin A: vertex, MVP 2.81cm, BPP 8/8, Twin B: complete breech, 4.98cm, BPP 8/8 Twin A: vertex, post placenta, MVP 3.7cm, BPP 8/8, Twin B: complete breech, post placenta, 4.98cm, BPP 8/8

## 2019-07-03 NOTE — OB PROVIDER TRIAGE NOTE - LABOR: BISHOP SCORE
----- Message from Brittnyarya Gomez sent at 10/12/2018  8:31 AM CDT -----  Contact: patients mother   Needs Advice    Reason for call: Please call patients mother with surgery arrival time, location, and any other instructions. States to please leave a detailed message if she is unable to answer.        Communication Preference: [phone]    
2

## 2019-07-03 NOTE — OB PROVIDER TRIAGE NOTE - NS_OBGYNHISTORY_OBGYN_ALL_OB_FT
2006- FT ; [id number]; infant death at 4mo of age, [facility]  2008- FT ; [id number], [facility]  2010- FT C/S for macrosomia; [id number]- [facility]   2013- FT C[facility]S (repeat); [id number]   SABx1, D&E <20w GA     GynHx: h/o HSV; h/o cone biopsy for abnormal pap smear; denies h/o fibroids, ovarian cysts, other STIs.

## 2019-07-03 NOTE — OB PROVIDER TRIAGE NOTE - HISTORY OF PRESENT ILLNESS
140/90 136/91 132/84 143/89 28yo  at 34w5d by 7w sonogram, presents to labor and delivery for blood pressure monitoring. She was sent from [facility] on  after having blood pressures 161/90 -> 135/89; she has been taking her blood pressure at home and it ranges from 132-143/84-91. She reports increased swelling in the legs bilaterally, specially after prolonged standing. Denies headache, visual changes, chest pain, N/V, SOB, RUQ or epigastric pain. She also reports contractions since presentation to labor and delivery @1545, feeling them q5-10min, 4/10 in intensity. Denies LOF, vaginal bleeding. Good fetal movements. This pregnancy she follows with high risk. She has history of PIH in all of her pregnancies. Denies being on medications or requiring magnesium. She was started on ASA but has been taking it inconsistently, last taken today at 1100. She has history of genital herpes, last outbreak 9 years ago, denies prodromal symptoms. Plan to start valtrex for suppression at 36w. Denies any other complications in this pregnancy.  ROS: denies fevers, chills, dysuria, changes in bowel habits, sick contacts. Last PO yesterday. Has been drinking water only today. Last intercourse 1 week ago. Last BM yesterday.    Allergies:  NKDA 30yo  at 34w5d by 7w sonogram, naturally conceived di-di twins, presents to labor and delivery for blood pressure monitoring. She was sent from [facility] on  after having blood pressures 161/90 -> 135/89; she has been taking her blood pressure at home and it ranges from 132-143/84-91. She reports increased swelling in the legs bilaterally, specially after prolonged standing. Denies headache, visual changes, chest pain, N/V, SOB, RUQ or epigastric pain. She also reports contractions since presentation to labor and delivery @1545, feeling them q5-10min, 4/10 in intensity. Denies LOF, vaginal bleeding. Good fetal movements. This pregnancy she follows with high risk for twins and h/o PIH. She has history of PIH in all of her pregnancies. Denies being on medications or requiring magnesium. She was started on ASA but has been taking it inconsistently, last taken today at 1100. She has history of genital herpes, last outbreak 9 years ago, denies prodromal symptoms. Plan to start valtrex for suppression at 36w. Denies any other complications in this pregnancy. H/o  x 2 followed by C/S x 2, will have repeat C/S this pregnancy.   ROS: denies fevers, chills, dysuria, changes in bowel habits, sick contacts. Last PO yesterday. Has been drinking water only today. Last intercourse 1 week ago. Last BM yesterday.    Allergies:  NKDA

## 2019-07-03 NOTE — OB PROVIDER TRIAGE NOTE - NS_SPECEXAM_OBGYN_ALL_OB
8/23/17      Health Maintenance   Topic Date Due    Potassium monitoring  1965    Hepatitis C screen  1965    HIV screen  06/12/1978    Lipid screen  06/12/2003    Colon cancer screen colonoscopy  06/12/2013    Creatinine monitoring  09/10/2013    Flu vaccine (1) 09/01/2017    DTaP/Tdap/Td vaccine (2 - Td) 01/01/2024             (applicable per patient's age: Cancer Screenings, Depression Screening, Fall Risk Screening, Immunizations)    BUN (mg/dL)   Date Value   09/10/2012 14      (goal A1C is < 7)   (goal LDL is <100) need 30-50% reduction from baseline     BP Readings from Last 3 Encounters:   08/23/17 122/68   04/11/17 118/72   12/12/16 128/78    (goal /80)      All Future Testing planned in [facility]:  Lab Frequency Next Occurrence       Next Visit Date:  Future Appointments  Date Time Provider Yoli Griggs   1/11/2018 3:40 PM Mare Mack CNP [facility]            Patient Active Problem List:     Restless leg syndrome     Hyperlipidemia     Disc disease, degenerative, lumbar or lumbosacral     Somatic dysfunction of cervical region     Chronic midline low back pain without sciatica     Essential hypertension     Obstructive sleep apnea syndrome
Yes

## 2019-07-03 NOTE — OB RN TRIAGE NOTE - NS_FETALHEARTHEAR_OBGYN_ALL_OB
Living with Osteoporosis: Preventing Fractures  If you have osteoporosis, you can do a lot to reduce its effect on your life. Knowing how to prevent fractures and spinal curvature can help you live more comfortably and safely with this disease.    Reducing your risk for fractures  The most common fracture sites in people with osteoporosis are the wrist, spine, and hip. These fractures are often caused by accidents and falls. All fractures are painful and may limit what you can do. But hip fractures are very serious. They need surgery, and it can take months to recover. To reduce your risk for fractures:  · Get regular exercise. Try walking, swimming, or weight training.  · Eat foods that are rich in calcium, or take calcium supplements.  · Make your home safe to help avoid accidents.  · Take extra precautions not to fall in risky areas, such as icy sidewalks.  Understanding spinal fractures  Your spine is made up of many bones called vertebrae. Osteoporosis can cause the vertebrae in your spine to collapse. As a result, your upper back may arch forward, creating a curvature. Spine fractures may also result from back strain and bad posture. You will also lose height. Your lower spine must then adjust to keep your body balanced. This can cause back pain. To prevent or lessen these spinal changes:  · Practice good posture.  · Use proper techniques if you need to lift heavy objects.  · Do back exercises to help your posture.  · Lie on your back when you have pain.  · Ask your healthcare provider about these and other ways to help your spine.  Date Last Reviewed: 10/17/2015  © 6488-9375 [employer]. [address] [place] [address], [place] [address]. All rights reserved. This information is not intended as a substitute for professional medical care. Always follow your healthcare professional's instructions.        Denosumab injection  What is this medicine?  DENOSUMAB (den oh rodrigo mab) slows bone breakdown. Prolia  is used to treat osteoporosis in women after menopause and in men. Xgeva is used to prevent bone fractures and other bone problems caused by cancer bone metastases. Xgeva is also used to treat giant cell tumor of the bone.  How should I use this medicine?  This medicine is for injection under the skin. It is given by a health care professional in a hospital or clinic setting.  If you are getting Prolia, a special MedGuide will be given to you by the pharmacist with each prescription and refill. Be sure to read this information carefully each time.  For Prolia, talk to your pediatrician regarding the use of this medicine in children. Special care may be needed. For Xgeva, talk to your pediatrician regarding the use of this medicine in children. While this drug may be prescribed for children as young as 13 years for selected conditions, precautions do apply.  What side effects may I notice from receiving this medicine?  Side effects that you should report to your doctor or health care professional as soon as possible:  · allergic reactions like skin rash, itching or hives, swelling of the face, lips, or tongue  · breathing problems  · chest pain  · fast, irregular heartbeat  · feeling faint or lightheaded, falls  · fever, chills, or any other sign of infection  · muscle spasms, tightening, or twitches  · numbness or tingling  · skin blisters or bumps, or is dry, peels, or red  · slow healing or unexplained pain in the mouth or jaw  · unusual bleeding or bruising  Side effects that usually do not require medical attention (Report these to your doctor or health care professional if they continue or are bothersome.):  · muscle pain  · stomach upset, gas  What may interact with this medicine?  Do not take this medicine with any of the following medications:  · other medicines containing denosumab  This medicine may also interact with the following medications:  · medicines that suppress the immune system  · medicines that  treat cancer  · steroid medicines like prednisone or cortisone  What if I miss a dose?  It is important not to miss your dose. Call your doctor or health care professional if you are unable to keep an appointment.  Where should I keep my medicine?  This medicine is only given in a clinic, doctor's office, or other health care setting and will not be stored at home.  What should I tell my health care provider before I take this medicine?  They need to know if you have any of these conditions:  · dental disease  · eczema  · infection or history of infections  · kidney disease or on dialysis  · low blood calcium or vitamin D  · malabsorption syndrome  · scheduled to have surgery or tooth extraction  · taking medicine that contains denosumab  · thyroid or parathyroid disease  · an unusual reaction to denosumab, other medicines, foods, dyes, or preservatives  · pregnant or trying to get pregnant  · breast-feeding  What should I watch for while using this medicine?  Visit your doctor or health care professional for regular checks on your progress. Your doctor or health care professional may order blood tests and other tests to see how you are doing.  Call your doctor or health care professional if you get a cold or other infection while receiving this medicine. Do not treat yourself. This medicine may decrease your body's ability to fight infection.  You should make sure you get enough calcium and vitamin D while you are taking this medicine, unless your doctor tells you not to. Discuss the foods you eat and the vitamins you take with your health care professional.  See your dentist regularly. Brush and floss your teeth as directed. Before you have any dental work done, tell your dentist you are receiving this medicine.  Do not become pregnant while taking this medicine or for 5 months after stopping it. Women should inform their doctor if they wish to become pregnant or think they might be pregnant. There is a potential  for serious side effects to an unborn child. Talk to your health care professional or pharmacist for more information.  NOTE:This sheet is a summary. It may not cover all possible information. If you have questions about this medicine, talk to your doctor, pharmacist, or health care provider. Copyright© 2017 [employer]        Denosumab injection  What is this medicine?  DENOSUMAB (den oh rodrigo mab) slows bone breakdown. Prolia is used to treat osteoporosis in women after menopause and in men. Xgeva is used to prevent bone fractures and other bone problems caused by cancer bone metastases. Xgeva is also used to treat giant cell tumor of the bone.  How should I use this medicine?  This medicine is for injection under the skin. It is given by a health care professional in a hospital or clinic setting.  If you are getting Prolia, a special MedGuide will be given to you by the pharmacist with each prescription and refill. Be sure to read this information carefully each time.  For Prolia, talk to your pediatrician regarding the use of this medicine in children. Special care may be needed. For Xgeva, talk to your pediatrician regarding the use of this medicine in children. While this drug may be prescribed for children as young as 13 years for selected conditions, precautions do apply.  What side effects may I notice from receiving this medicine?  Side effects that you should report to your doctor or health care professional as soon as possible:  · allergic reactions like skin rash, itching or hives, swelling of the face, lips, or tongue  · breathing problems  · chest pain  · fast, irregular heartbeat  · feeling faint or lightheaded, falls  · fever, chills, or any other sign of infection  · muscle spasms, tightening, or twitches  · numbness or tingling  · skin blisters or bumps, or is dry, peels, or red  · slow healing or unexplained pain in the mouth or jaw  · unusual bleeding or bruising  Side effects that usually do  not require medical attention (Report these to your doctor or health care professional if they continue or are bothersome.):  · muscle pain  · stomach upset, gas  What may interact with this medicine?  Do not take this medicine with any of the following medications:  · other medicines containing denosumab  This medicine may also interact with the following medications:  · medicines that suppress the immune system  · medicines that treat cancer  · steroid medicines like prednisone or cortisone  What if I miss a dose?  It is important not to miss your dose. Call your doctor or health care professional if you are unable to keep an appointment.  Where should I keep my medicine?  This medicine is only given in a clinic, doctor's office, or other health care setting and will not be stored at home.  What should I tell my health care provider before I take this medicine?  They need to know if you have any of these conditions:  · dental disease  · eczema  · infection or history of infections  · kidney disease or on dialysis  · low blood calcium or vitamin D  · malabsorption syndrome  · scheduled to have surgery or tooth extraction  · taking medicine that contains denosumab  · thyroid or parathyroid disease  · an unusual reaction to denosumab, other medicines, foods, dyes, or preservatives  · pregnant or trying to get pregnant  · breast-feeding  What should I watch for while using this medicine?  Visit your doctor or health care professional for regular checks on your progress. Your doctor or health care professional may order blood tests and other tests to see how you are doing.  Call your doctor or health care professional if you get a cold or other infection while receiving this medicine. Do not treat yourself. This medicine may decrease your body's ability to fight infection.  You should make sure you get enough calcium and vitamin D while you are taking this medicine, unless your doctor tells you not to. Discuss the  foods you eat and the vitamins you take with your health care professional.  See your dentist regularly. Brush and floss your teeth as directed. Before you have any dental work done, tell your dentist you are receiving this medicine.  Do not become pregnant while taking this medicine or for 5 months after stopping it. Women should inform their doctor if they wish to become pregnant or think they might be pregnant. There is a potential for serious side effects to an unborn child. Talk to your health care professional or pharmacist for more information.  NOTE:This sheet is a summary. It may not cover all possible information. If you have questions about this medicine, talk to your doctor, pharmacist, or health care provider. Copyright© 2017 [employer]         Yes

## 2019-07-03 NOTE — OB PROVIDER TRIAGE NOTE - NSOBPROVIDERNOTE_OBGYN_ALL_OB_FT
28yo  di/di twin gestation at 34w5d, GBS unknown, with elevated blood pressures at home, r/o gHTN vs preeclampsia, with contractions, r/o  labor, BPP on , for prolonged monitoring    -Cont efm and toco  -IV hydration  -Reevaluate 30yo , naturally di/di twin gestation at 34w5d, GBS unknown, with elevated blood pressures at home, r/o gHTN vs preeclampsia, with contractions, r/o  labor, BPP on , for prolonged monitoring    -Cont efm and toco  -IV hydration  -Reevaluate  - Preeclamptic labs 30yo , naturally di/di twin gestation at 34w5d, GBS unknown, with elevated blood pressures at home, r/o gHTN vs preeclampsia, with contractions, r/o  labor, BPP on , for prolonged monitoring    -Cont efm and toco  -IV hydration  -Reevaluate  -Preeclamptic labs  -f/u urine protein/creatinine     Dr. Oreilly and Dr. Barros aware

## 2019-07-04 ENCOUNTER — RESULT REVIEW (OUTPATIENT)
Age: 29
End: 2019-07-04

## 2019-07-04 LAB
ALBUMIN SERPL ELPH-MCNC: 3 G/DL — LOW (ref 3.5–5.2)
ALP SERPL-CCNC: 115 U/L — SIGNIFICANT CHANGE UP (ref 30–115)
ALT FLD-CCNC: 14 U/L — SIGNIFICANT CHANGE UP (ref 0–41)
ANION GAP SERPL CALC-SCNC: 14 MMOL/L — SIGNIFICANT CHANGE UP (ref 7–14)
AST SERPL-CCNC: 20 U/L — SIGNIFICANT CHANGE UP (ref 0–41)
BASOPHILS # BLD AUTO: 0.01 K/UL — SIGNIFICANT CHANGE UP (ref 0–0.2)
BASOPHILS NFR BLD AUTO: 0.1 % — SIGNIFICANT CHANGE UP (ref 0–1)
BILIRUB SERPL-MCNC: 0.4 MG/DL — SIGNIFICANT CHANGE UP (ref 0.2–1.2)
BLD GP AB SCN SERPL QL: SIGNIFICANT CHANGE UP
BUN SERPL-MCNC: <3 MG/DL — LOW (ref 10–20)
CALCIUM SERPL-MCNC: 8.4 MG/DL — LOW (ref 8.5–10.1)
CHLORIDE SERPL-SCNC: 102 MMOL/L — SIGNIFICANT CHANGE UP (ref 98–110)
CO2 SERPL-SCNC: 20 MMOL/L — SIGNIFICANT CHANGE UP (ref 17–32)
CREAT SERPL-MCNC: 0.5 MG/DL — LOW (ref 0.7–1.5)
EOSINOPHIL # BLD AUTO: 0.01 K/UL — SIGNIFICANT CHANGE UP (ref 0–0.7)
EOSINOPHIL NFR BLD AUTO: 0.1 % — SIGNIFICANT CHANGE UP (ref 0–8)
GLUCOSE SERPL-MCNC: 120 MG/DL — HIGH (ref 70–99)
HCT VFR BLD CALC: 32.5 % — LOW (ref 37–47)
HGB BLD-MCNC: 10.9 G/DL — LOW (ref 12–16)
IMM GRANULOCYTES NFR BLD AUTO: 0.5 % — HIGH (ref 0.1–0.3)
LDH SERPL L TO P-CCNC: 188 — SIGNIFICANT CHANGE UP (ref 50–242)
LYMPHOCYTES # BLD AUTO: 0.99 K/UL — LOW (ref 1.2–3.4)
LYMPHOCYTES # BLD AUTO: 6.4 % — LOW (ref 20.5–51.1)
MAGNESIUM SERPL-MCNC: 3.1 MG/DL — CRITICAL HIGH (ref 1.8–2.4)
MAGNESIUM SERPL-MCNC: 3.9 MG/DL — CRITICAL HIGH (ref 1.8–2.4)
MCHC RBC-ENTMCNC: 29.9 PG — SIGNIFICANT CHANGE UP (ref 27–31)
MCHC RBC-ENTMCNC: 33.5 G/DL — SIGNIFICANT CHANGE UP (ref 32–37)
MCV RBC AUTO: 89 FL — SIGNIFICANT CHANGE UP (ref 81–99)
MONOCYTES # BLD AUTO: 1.11 K/UL — HIGH (ref 0.1–0.6)
MONOCYTES NFR BLD AUTO: 7.1 % — SIGNIFICANT CHANGE UP (ref 1.7–9.3)
NEUTROPHILS # BLD AUTO: 13.34 K/UL — HIGH (ref 1.4–6.5)
NEUTROPHILS NFR BLD AUTO: 85.8 % — HIGH (ref 42.2–75.2)
NRBC # BLD: 0 /100 WBCS — SIGNIFICANT CHANGE UP (ref 0–0)
PLATELET # BLD AUTO: 210 K/UL — SIGNIFICANT CHANGE UP (ref 130–400)
POTASSIUM SERPL-MCNC: 4 MMOL/L — SIGNIFICANT CHANGE UP (ref 3.5–5)
POTASSIUM SERPL-SCNC: 4 MMOL/L — SIGNIFICANT CHANGE UP (ref 3.5–5)
PRENATAL SYPHILIS TEST: SIGNIFICANT CHANGE UP
PROT SERPL-MCNC: 5.4 G/DL — LOW (ref 6–8)
RBC # BLD: 3.65 M/UL — LOW (ref 4.2–5.4)
RBC # FLD: 13.5 % — SIGNIFICANT CHANGE UP (ref 11.5–14.5)
SODIUM SERPL-SCNC: 136 MMOL/L — SIGNIFICANT CHANGE UP (ref 135–146)
URATE SERPL-MCNC: 5.1 MG/DL — SIGNIFICANT CHANGE UP (ref 2.5–7)
WBC # BLD: 15.54 K/UL — HIGH (ref 4.8–10.8)
WBC # FLD AUTO: 15.54 K/UL — HIGH (ref 4.8–10.8)

## 2019-07-04 PROCEDURE — 88307 TISSUE EXAM BY PATHOLOGIST: CPT | Mod: 26

## 2019-07-04 PROCEDURE — 59514 CESAREAN DELIVERY ONLY: CPT | Mod: U7

## 2019-07-04 PROCEDURE — 58300 INSERT INTRAUTERINE DEVICE: CPT

## 2019-07-04 RX ORDER — OXYTOCIN 10 UNIT/ML
333.33 VIAL (ML) INJECTION
Qty: 20 | Refills: 0 | Status: DISCONTINUED | OUTPATIENT
Start: 2019-07-04 | End: 2019-07-07

## 2019-07-04 RX ORDER — OXYCODONE HYDROCHLORIDE 5 MG/1
5 TABLET ORAL
Refills: 0 | Status: DISCONTINUED | OUTPATIENT
Start: 2019-07-04 | End: 2019-07-07

## 2019-07-04 RX ORDER — ENOXAPARIN SODIUM 100 MG/ML
40 INJECTION SUBCUTANEOUS EVERY 24 HOURS
Refills: 0 | Status: DISCONTINUED | OUTPATIENT
Start: 2019-07-04 | End: 2019-07-07

## 2019-07-04 RX ORDER — MAGNESIUM SULFATE 500 MG/ML
2.5 VIAL (ML) INJECTION
Qty: 40 | Refills: 0 | Status: DISCONTINUED | OUTPATIENT
Start: 2019-07-04 | End: 2019-07-04

## 2019-07-04 RX ORDER — IBUPROFEN 200 MG
600 TABLET ORAL EVERY 6 HOURS
Refills: 0 | Status: COMPLETED | OUTPATIENT
Start: 2019-07-04 | End: 2020-06-01

## 2019-07-04 RX ORDER — CEFAZOLIN SODIUM 1 G
2000 VIAL (EA) INJECTION ONCE
Refills: 0 | Status: DISCONTINUED | OUTPATIENT
Start: 2019-07-04 | End: 2019-07-04

## 2019-07-04 RX ORDER — MAGNESIUM SULFATE 500 MG/ML
4 VIAL (ML) INJECTION ONCE
Refills: 0 | Status: COMPLETED | OUTPATIENT
Start: 2019-07-04 | End: 2019-07-04

## 2019-07-04 RX ORDER — OXYCODONE HYDROCHLORIDE 5 MG/1
5 TABLET ORAL ONCE
Refills: 0 | Status: DISCONTINUED | OUTPATIENT
Start: 2019-07-04 | End: 2019-07-07

## 2019-07-04 RX ORDER — OXYTOCIN 10 UNIT/ML
333.33 VIAL (ML) INJECTION
Qty: 20 | Refills: 0 | Status: DISCONTINUED | OUTPATIENT
Start: 2019-07-04 | End: 2019-07-04

## 2019-07-04 RX ORDER — KETOROLAC TROMETHAMINE 30 MG/ML
30 SYRINGE (ML) INJECTION EVERY 6 HOURS
Refills: 0 | Status: DISCONTINUED | OUTPATIENT
Start: 2019-07-04 | End: 2019-07-04

## 2019-07-04 RX ORDER — SIMETHICONE 80 MG/1
80 TABLET, CHEWABLE ORAL EVERY 6 HOURS
Refills: 0 | Status: DISCONTINUED | OUTPATIENT
Start: 2019-07-04 | End: 2019-07-07

## 2019-07-04 RX ORDER — ACETAMINOPHEN 500 MG
975 TABLET ORAL
Refills: 0 | Status: DISCONTINUED | OUTPATIENT
Start: 2019-07-04 | End: 2019-07-07

## 2019-07-04 RX ORDER — DOCUSATE SODIUM 100 MG
100 CAPSULE ORAL
Refills: 0 | Status: DISCONTINUED | OUTPATIENT
Start: 2019-07-04 | End: 2019-07-07

## 2019-07-04 RX ORDER — MAGNESIUM SULFATE 500 MG/ML
2 VIAL (ML) INJECTION
Qty: 40 | Refills: 0 | Status: DISCONTINUED | OUTPATIENT
Start: 2019-07-04 | End: 2019-07-04

## 2019-07-04 RX ORDER — MAGNESIUM HYDROXIDE 400 MG/1
30 TABLET, CHEWABLE ORAL
Refills: 0 | Status: DISCONTINUED | OUTPATIENT
Start: 2019-07-04 | End: 2019-07-07

## 2019-07-04 RX ORDER — CEFAZOLIN SODIUM 1 G
3000 VIAL (EA) INJECTION ONCE
Refills: 0 | Status: COMPLETED | OUTPATIENT
Start: 2019-07-04 | End: 2019-07-04

## 2019-07-04 RX ORDER — SODIUM CHLORIDE 9 MG/ML
1000 INJECTION, SOLUTION INTRAVENOUS
Refills: 0 | Status: DISCONTINUED | OUTPATIENT
Start: 2019-07-04 | End: 2019-07-04

## 2019-07-04 RX ORDER — LANOLIN
1 OINTMENT (GRAM) TOPICAL EVERY 6 HOURS
Refills: 0 | Status: DISCONTINUED | OUTPATIENT
Start: 2019-07-04 | End: 2019-07-07

## 2019-07-04 RX ORDER — GLYCERIN ADULT
1 SUPPOSITORY, RECTAL RECTAL AT BEDTIME
Refills: 0 | Status: DISCONTINUED | OUTPATIENT
Start: 2019-07-04 | End: 2019-07-07

## 2019-07-04 RX ADMIN — Medication 200 MILLIGRAM(S): at 01:50

## 2019-07-04 RX ADMIN — Medication 30 MILLIGRAM(S): at 19:21

## 2019-07-04 RX ADMIN — Medication 975 MILLIGRAM(S): at 12:07

## 2019-07-04 RX ADMIN — ENOXAPARIN SODIUM 40 MILLIGRAM(S): 100 INJECTION SUBCUTANEOUS at 19:22

## 2019-07-04 RX ADMIN — Medication 50 GM/HR: at 05:19

## 2019-07-04 RX ADMIN — Medication 1000 MILLIGRAM(S): at 23:24

## 2019-07-04 RX ADMIN — Medication 30 MILLIGRAM(S): at 15:37

## 2019-07-04 RX ADMIN — SODIUM CHLORIDE 125 MILLILITER(S): 9 INJECTION, SOLUTION INTRAVENOUS at 09:33

## 2019-07-04 RX ADMIN — Medication 975 MILLIGRAM(S): at 15:36

## 2019-07-04 RX ADMIN — Medication 30 MILLIGRAM(S): at 09:38

## 2019-07-04 RX ADMIN — Medication 100 GRAM(S): at 04:53

## 2019-07-04 RX ADMIN — Medication 30 MILLIGRAM(S): at 19:25

## 2019-07-04 NOTE — PROGRESS NOTE ADULT - SUBJECTIVE AND OBJECTIVE BOX
Subjective:   Pt evaluated at bedside for magnesium check. She denies visual disturbances, headache, RUQ/epigastric pain, n/v, CP, SOB. Pain well controlled.     Objective:   T(F): 98.2 ( @ 18:00), Max: 98.6 ( @ 13:00)  HR: 92 ( @ 20:48)  BP: 112/57 ( @ 20:48) (93/54 - 132/58)  RR: 17 ( @ 18:00)  SpO2: 97% ( @ 20:47)  Vital Signs Last 24 Hrs  BP: 112/57 (2019 20:48) (93/54 - 162/65--> 7/3@ 2300)    Gen: NAD, AAOx3  CV: RRR, no M/R/G  Pulm: CTAB, no R/R/W  Abd: soft, nontender, no rebound or guarding, no epigastric tenderness, liver nonpalpable +BS.   : Burk, clear  Ext: +1 edema kwadwo, SCDs in place  Neuro: Reflexes 2+ bilaterally upper extremities  Urine outpt: 200cc/hr ([id number])    19 @ 07:01  -  19 @ 07:00  --------------------------------------------------------  IN: 450 mL / OUT: 450 mL / NET: 0 mL    19 @ 07:01  -  19 @ 20:52  --------------------------------------------------------  IN: 3350 mL / OUT: 1665 mL / NET: 1685 mL        Medications:  acetaminophen   Tablet .. 975 milliGRAM(s) Oral <User Schedule>  docusate sodium 100 milliGRAM(s) Oral two times a day  enoxaparin Injectable 40 milliGRAM(s) SubCutaneous every 24 hours  ibuprofen  Tablet. 600 milliGRAM(s) Oral every 6 hours  ketorolac   Injectable 30 milliGRAM(s) IV Push every 6 hours  magnesium sulfate Infusion 2.5 Gm/Hr IV Continuous <Continuous>    Allergies: Nyquil Cold Medicine (Other)      Labs:                        10.9   15.54 )-----------( 210      ( 2019 16:20 )             32.5     -    136  |  102  |  <3<L>  ----------------------------<  120<H>  4.0   |  20  |  0.5<L>    Ca    8.4<L>      2019 16:20  Mg     3.1         TPro  5.4<L>  /  Alb  3.0<L>  /  TBili  0.4  /  DBili  x   /  AST  20  /  ALT  14  /  AlkPhos  115      Uric Acid, Serum: 5.1 mg/dL ( @ 16:20)  Magnesium, Serum: 3.1 mg/dL ( @ 12:40)  LDH: 188 mg/dL      Assessment:  29y P5, s/p repeat  for di-di twins @34.6wk, POD #0, on magnesium for preeclampsia with severe features and seizure prophylaxis, BPs well-controlled, doing well.    Plan:  -Continue magnesium until:  @0238  -Repeat Mag level at 2100  -Cont to monitor BPs and I/Os  -Pain management prn  -Monitor for signs and symptoms of Magnesium toxicity  -neuro checks q 2hr  -reassess    Dr. Tinoco and Dr. Fernandez to be made aware. Subjective:   Pt evaluated at bedside for magnesium check. She denies visual disturbances, headache, RUQ/epigastric pain, n/v, CP, SOB. Pain well controlled.     Objective:   T(F): 98.2 ( @ 18:00), Max: 98.6 ( @ 13:00)  HR: 92 ( @ 20:48)  BP: 112/57 ( @ 20:48) (93/54 - 132/58)  RR: 17 ( @ 18:00)  SpO2: 97% ( @ 20:47)  Vital Signs Last 24 Hrs  BP: 112/57 (2019 20:48) (last severe 162/65--> 7/3@ 2300 followed by 142/51)    Gen: NAD, AAOx3  CV: RRR, no M/R/G  Pulm: CTAB, no R/R/W  Abd: soft, nontender, no rebound or guarding, no epigastric tenderness, liver nonpalpable +BS.   : Burk, clear  Ext: +1 edema kwadwo, SCDs in place  Neuro: Reflexes 2+ bilaterally upper extremities  Urine outpt: 200cc/hr ([id number])    19 @ 07:01  -  19 @ 07:00  --------------------------------------------------------  IN: 450 mL / OUT: 450 mL / NET: 0 mL    19 @ 07:01  -  19 @ 20:52  --------------------------------------------------------  IN: 3350 mL / OUT: 1665 mL / NET: 1685 mL        Medications:  acetaminophen   Tablet .. 975 milliGRAM(s) Oral <User Schedule>  docusate sodium 100 milliGRAM(s) Oral two times a day  enoxaparin Injectable 40 milliGRAM(s) SubCutaneous every 24 hours  ibuprofen  Tablet. 600 milliGRAM(s) Oral every 6 hours  ketorolac   Injectable 30 milliGRAM(s) IV Push every 6 hours  magnesium sulfate Infusion 2.5 Gm/Hr IV Continuous <Continuous>    Allergies: Nyquil Cold Medicine (Other)      Labs:                        10.9   15.54 )-----------( 210      ( 2019 16:20 )             32.5         136  |  102  |  <3<L>  ----------------------------<  120<H>  4.0   |  20  |  0.5<L>    Ca    8.4<L>      2019 16:20  Mg     3.1         TPro  5.4<L>  /  Alb  3.0<L>  /  TBili  0.4  /  DBili  x   /  AST  20  /  ALT  14  /  AlkPhos  115      Uric Acid, Serum: 5.1 mg/dL ( @ 16:20)  Magnesium, Serum: 3.1 mg/dL ( @ 12:40)  LDH: 188 mg/dL      Assessment:  29y P5, s/p repeat  for di-di twins @34.6wk, POD #0, on magnesium for preeclampsia with severe features and seizure prophylaxis, BPs well-controlled, doing well.    Plan:  -Continue magnesium until:  @0238  -Repeat Mag level at 2100  -Cont to monitor BPs and I/Os  -Pain management prn  -Monitor for signs and symptoms of Magnesium toxicity  -neuro checks q 2hr  -reassess    Dr. Tinoco and Dr. Fernandez to be made aware.

## 2019-07-04 NOTE — PROGRESS NOTE ADULT - ASSESSMENT
30 yo now , s/p repeat  section @34.6w for di/di twins and preeclampsia w/ severe features, on Mg for seizure prophylaxis, BPs well controlled on Mg, doing well.    - c/w Mg until  @0238  - next Mg level @2100 (ordered)  - BPs q15min  - seizure precautions  - advance to regular diet  - strict I/Os, monitor UO  - neuro checks q2hrs  - pain management PRN  - f/up G/C, placental pathology    Dr. Christianson aware and Dr. Fernandez to be made aware.

## 2019-07-04 NOTE — PROGRESS NOTE ADULT - SUBJECTIVE AND OBJECTIVE BOX
PGY 2 Progress Note    Subjective: Pt seen and evaluated at bedside for magnesium check. Doing well, pain controlled. Denies, HA, blurry vision, CP, SOB, N/V, RUQ/epigastric pain, LE pain.     Objective:   Vital Signs Last 24 Hrs  T(F): 98.9 (04 Jul 2019 04:17), Max: 98.9 (04 Jul 2019 04:17)  HR: 70 (04 Jul 2019 18:12) (51 - 150)  BP: 104/62 (04 Jul 2019 18:03) (93/54 - 164/75)  SpO2: 97% (04 Jul 2019 18:07) (60% - 100%)    UO: ([id number]): 600cc    Gen: NAD, AAOx3  CV: RRR, no M/R/G  Pulm: CTAB, no R/R/W  Abd: soft, nontender, firm uterine fundus below umbilicus, +BS  Incision: MULU in place, no increased staining past marked area  : Burk draining clear  Ext: b/l SCDs in place  Neuro: Reflexes 2+ b/l UE    Medications:  7/4  @0453 Mg started 2g/hr, increased to 2.5g/hr @1600    Labs:                        10.9   15.54 )-----------( 210      ( 04 Jul 2019 16:20 )             32.5     07-04    136  |  102  |  <3<L>  ----------------------------<  120<H>  4.0   |  20  |  0.5<L>    Ca    8.4<L>      04 Jul 2019 16:20  Mg     3.1     07-04    TPro  5.4<L>  /  Alb  3.0<L>  /  TBili  0.4  /  DBili  x   /  AST  20  /  ALT  14  /  AlkPhos  115  07-04    Uric Acid, Serum: 5.1 mg/dL (07-04 @ 16:20)  Magnesium, Serum: 3.1 mg/dL (07-04 @ 12:40)  Uric Acid, Serum: 4.7 mg/dL (07-03 @ 19:04)

## 2019-07-04 NOTE — OB PROVIDER H&P - NS_OBGYNHISTORY_OBGYN_ALL_OB_FT
2006- FT ; [id number]; infant death at 4mo of age, [facility]  2008- FT ; [id number], [facility]  2010- FT C/S for macrosomia; [id number]- [facility]   2013- FT C/S (repeat); [id number]   SABx1, D&E <20w GA     GynHx: h/o HSV; h/o cone biopsy for abnormal pap smear; denies h/o fibroids, ovarian cysts, other STIs.

## 2019-07-04 NOTE — PROGRESS NOTE ADULT - SUBJECTIVE AND OBJECTIVE BOX
Subjective:   Pt evaluated at bedside for magnesium check. She denies visual disturbances, headache and right upper quadrant pain. Also denies nausea/vomiting/chest pain/epigastric pain/shortness of breath. Pain well controlled.     Objective:   T(F): 98.9 (07-04 @ 04:17), Max: 98.9 (07-04 @ 04:17)  HR: 96 (07-04 @ 08:16)  BP: 111/60 (07-04 @ 08:16) (100/55 - 162/65)  RR: --  SpO2: 94% (07-04 @ 08:19)  Vital Signs Last 24 Hrs  BP: 111/60 (04 Jul 2019 08:16) (100/55 - 164/75)  Daily Height in cm: 157.48 (04 Jul 2019 01:15)    Daily Baby A: Weight (gm) Delivery: 2550 (04 Jul 2019 02:23)    07-03 @ 07:01  -  07-04 @ 07:00  --------------------------------------------------------  IN: 450 mL / OUT: 450 mL / NET: 0 mL    07-04 @ 07:01  -  07-04 @ 08:19  --------------------------------------------------------  IN: 0 mL / OUT: 150 mL / NET: -150 mL    Gen: NAD, AAOx3  CV: RRR, no M/R/G  Pulm: CTAB, no R/R/W  Abd: soft, nontender, no rebound or guarding, no epigastric tenderness  Incision: MULU dressing in place, 2x2 stain of blood in middle inferior border  : Burk in place with clear urine  Ext: +1 edema kwadwo, SCDs in place  Neuro: Reflexes 2+ bilaterally upper and lower    Medications:  acetaminophen   Tablet .. 975 milliGRAM(s) Oral <User Schedule>  docusate sodium 100 milliGRAM(s) Oral two times a day  enoxaparin Injectable 40 milliGRAM(s) SubCutaneous every 24 hours  glycerin Suppository - Adult 1 Suppository(s) Rectal at bedtime PRN  ibuprofen  Tablet. 600 milliGRAM(s) Oral every 6 hours  ketorolac   Injectable 30 milliGRAM(s) IV Push every 6 hours  lactated ringers. 1000 milliLiter(s) IV Continuous <Continuous>  lanolin Ointment 1 Application(s) Topical every 6 hours PRN  magnesium hydroxide Suspension 30 milliLiter(s) Oral two times a day PRN  magnesium sulfate Infusion 2 Gm/Hr IV Continuous <Continuous>  oxyCODONE    IR 5 milliGRAM(s) Oral every 3 hours PRN  oxyCODONE    IR 5 milliGRAM(s) Oral once PRN  oxytocin Infusion 333.333 milliUNIT(s)/Min IV Continuous <Continuous>  oxytocin Infusion 333.333 milliUNIT(s)/Min IV Continuous <Continuous>  simethicone 80 milliGRAM(s) Chew every 6 hours    Nyquil Cold Medicine (Other)      Labs:                        11.4   9.63  )-----------( 219      ( 03 Jul 2019 19:04 )             34.5     07-03    135  |  98  |  <3<L>  ----------------------------<  68<L>  3.5   |  22  |  0.5<L>    Ca    9.1      03 Jul 2019 19:04    TPro  6.4  /  Alb  3.3<L>  /  TBili  0.6  /  DBili  x   /  AST  18  /  ALT  15  /  AlkPhos  137<H>  07-03    Uric Acid, Serum: 4.7 mg/dL (07-03 @ 19:04)    Upr/cr 0.3

## 2019-07-04 NOTE — OB PROVIDER H&P - ATTENDING COMMENTS
prior c/s x2, all pregnancies complicated by PIH, now 34w6d, tanner twins, meeting criteria for preeclampsia with severe features due to BP in the severe range >4h apart. no PIH symptoms, PIH labs wnl. Explained to pt recommendation for delivery in pregnancies >34 wks with preeclampsia with severe features. pt understands and questions answered. risks, benefits and alternatives discussed as well as possible fetal complications. start magnesium and monitor bps. on call to the OR. she was indecisive about BTL during prenatal care, no consent scanned into computer, no copy with patient. desires IUD insertion

## 2019-07-04 NOTE — PROGRESS NOTE ADULT - ASSESSMENT
28 y/o P5 s/p repeat  at 34w6d of di/di twins and preeclampsia with severe features, now with normal BPs, on magnesium for seizure prophylaxis, POD0, doing well.   - c/w Mag until 24 hours postpartum  @0238  - BPs q15 min.  - strict I/Os  - neuro checks q2h  - pain management PRN  - seizure precautions  - postpartum CBC @1600    Dr. Cervantes to be made aware.

## 2019-07-04 NOTE — PROGRESS NOTE ADULT - SUBJECTIVE AND OBJECTIVE BOX
Subjective:   Pt evaluated at bedside for magnesium check. Reports mild frontal headache and blurry vision. She denies right upper quadrant pain. Also denies nausea/vomiting/chest pain/epigastric pain/shortness of breath. Pain well controlled.     Objective:   T(F): 98.9 (07-04 @ 04:17), Max: 98.9 (07-04 @ 04:17)  HR: 97 (07-04 @ 11:53)  BP: 113/57 (07-04 @ 11:48) (95/55 - 142/75)  RR: --  SpO2: 95% (07-04 @ 11:53)  Vital Signs Last 24 Hrs  BP: 113/57 (04 Jul 2019 11:48) (95/55 - 164/75)  Daily Height in cm: 157.48 (04 Jul 2019 01:15)    Daily Baby A: Weight (gm) Delivery: 2550 (04 Jul 2019 02:23)    07-03 @ 07:01  -  07-04 @ 07:00  --------------------------------------------------------  IN: 450 mL / OUT: 450 mL / NET: 0 mL    07-04 @ 07:01  -  07-04 @ 11:55  --------------------------------------------------------  IN: 250 mL / OUT: 230 mL / NET: 20 mL    Gen: NAD, AAOx3  CV: RRR, no M/R/G  Pulm: CTAB, no R/R/W  Abd: soft, nontender, no rebound or guarding, no epigastric tenderness  Incision: MULU dressing in place, 2x2 stain of blood in middle inferior border  : Burk in place with clear urine  Ext: +1 edema kwadwo, SCDs in place  Neuro: Reflexes 2+ bilaterally upper and lower    Medications:  acetaminophen   Tablet .. 975 milliGRAM(s) Oral <User Schedule>  docusate sodium 100 milliGRAM(s) Oral two times a day  enoxaparin Injectable 40 milliGRAM(s) SubCutaneous every 24 hours  glycerin Suppository - Adult 1 Suppository(s) Rectal at bedtime PRN  ibuprofen  Tablet. 600 milliGRAM(s) Oral every 6 hours  ketorolac   Injectable 30 milliGRAM(s) IV Push every 6 hours  lactated ringers. 1000 milliLiter(s) IV Continuous <Continuous>  lanolin Ointment 1 Application(s) Topical every 6 hours PRN  magnesium hydroxide Suspension 30 milliLiter(s) Oral two times a day PRN  magnesium sulfate Infusion 2 Gm/Hr IV Continuous <Continuous>  oxyCODONE    IR 5 milliGRAM(s) Oral every 3 hours PRN  oxyCODONE    IR 5 milliGRAM(s) Oral once PRN  oxytocin Infusion 333.333 milliUNIT(s)/Min IV Continuous <Continuous>  oxytocin Infusion 333.333 milliUNIT(s)/Min IV Continuous <Continuous>  simethicone 80 milliGRAM(s) Chew every 6 hours    Nyquil Cold Medicine (Other)      Labs:                        11.4   9.63  )-----------( 219      ( 03 Jul 2019 19:04 )             34.5     07-03    135  |  98  |  <3<L>  ----------------------------<  68<L>  3.5   |  22  |  0.5<L>    Ca    9.1      03 Jul 2019 19:04    TPro  6.4  /  Alb  3.3<L>  /  TBili  0.6  /  DBili  x   /  AST  18  /  ALT  15  /  AlkPhos  137<H>  07-03    Uric Acid, Serum: 4.7 mg/dL (07-03 @ 19:04)    Upr/cr 0.3

## 2019-07-04 NOTE — OB PROVIDER H&P - HISTORY OF PRESENT ILLNESS
30yo  at 34w5d by 7w sonogram, naturally conceived di-di twins, presents to labor and delivery for blood pressure monitoring. She was sent from [facility] on  after having blood pressures 161/90 -> 135/89; she has been taking her blood pressure at home and it ranges from 132-143/84-91. She reports increased swelling in the legs bilaterally, specially after prolonged standing. Denies headache, visual changes, chest pain, N/V, SOB, RUQ or epigastric pain. She also reports contractions since presentation to labor and delivery @1545, feeling them q5-10min, 4/10 in intensity. Denies LOF, vaginal bleeding. Good fetal movements. This pregnancy she follows with high risk for twins and h/o PIH. She has history of PIH in all of her pregnancies. Denies being on medications or requiring magnesium. She was started on ASA but has been taking it inconsistently, last taken today at 1100. She has history of genital herpes, last outbreak 9 years ago, denies prodromal symptoms. Plan to start valtrex for suppression at 36w. Denies any other complications in this pregnancy. H/o  x 2 followed by C/S x 2, will have repeat C/S this pregnancy.   ROS: denies fevers, chills, dysuria, changes in bowel habits, sick contacts. Last PO yesterday. Has been drinking water only today. Last intercourse 1 week ago. Last BM yesterday.    Allergies:  NKDA     Addendum: Pt seen and evaluated at bedside for severe range BPs; met criteria for preeclampsia with severe features. Counseled regarding need for Mg for seizure prophylaxis and delivery; pt agreed for repeat C/S.

## 2019-07-04 NOTE — OB PROVIDER H&P - NSHPLABSRESULTS_GEN_ALL_CORE
7w1d- di/di gestation, twin A CRL 8.29mm, FH 156bpm smallsubchorionic bleed; Twin B CRL 10mm, FH 144bpm  EGA 21.3w- Twin A: efw 529g; cephalic, 2vc, post placenta, MVP 4.7cm; normal anatomy   	Twin B: efw 513g; transverse, 3vc, post placenta, MVP 4.7cm; normal anatomy   ega 26.4w- Twin A: efw 935g, complete breech, post placenta, MVP 4.9cm; normal anatomy   	Twin B: efw 989g (53%), cephalic, post placenta, MVP 4.8cm, normal anatomy   ega 29.5w- Twin A: 1540g (57%) breech, post placenta, MVP 5.3cm; Twin B: 1684g (66%), breech, post placenta, MVP 5.2cm   ega 32.3w- Twin A: cephalic, post placenta, MVP 5.3cm; BPP 8/8; Twin B: incomplete breech, post placenta, MVP 4.8cm; BPP 8/8   33.5w- Twin A cephali,c EFW 2314g (49%), ant placenta, MVP 55mm  Twin B EFW 2388g (53%), breech, post placenta, MVP A 55mm, MVP B 55mm, BPP A 8/8, BPP B 8/8, weight discordance 3%  34.3w- TwinA cephalic, post placenta, MVP 40mm,  BPP 8/8            TwinB incomplete breech, pos placenta, MVP 52mm, BPP 8/8      FETAL ECHO baby A for 2VC- normal     early ;       3/27/19   	O pos   	HbsAg NR; HIV NR; RPR NR   	Rubella/varicella immune     	6/17/19   	HIV NR   	measles immune    3/27/19 PELS WNL   6/17/19 PELS WNL

## 2019-07-04 NOTE — BRIEF OPERATIVE NOTE - NSICDXBRIEFPREOP_GEN_ALL_CORE_FT
PRE-OP DIAGNOSIS:  H/O  section 2019 04:26:37 30yo  at 34w6d GA, GBS pos, di/di twins, preeclampsia with severe features, for repeat  section Jerrod Sheriff

## 2019-07-04 NOTE — PROGRESS NOTE ADULT - SUBJECTIVE AND OBJECTIVE BOX
PGY 4 note    patient seen at bedside and evaluated.  Denies headache, blurry vision, chest pain, SOB, RUQ/epigastric pain.  Tolerating PO. Passing flatus.  ambulating and voiding.    VS  ICU Vital Signs Last 24 Hrs  T(C): 36.8 (04 Jul 2019 18:00), Max: 37.2 (04 Jul 2019 04:17)  T(F): 98.2 (04 Jul 2019 18:00), Max: 98.9 (04 Jul 2019 04:17)  HR: 76 (04 Jul 2019 23:33) (51 - 150)  BP: 110/54 (04 Jul 2019 23:33) (93/54 - 142/75)  BP(mean): --  ABP: --  ABP(mean): --  RR: 17 (04 Jul 2019 18:00) (17 - 18)  SpO2: 97% (04 Jul 2019 23:12) (60% - 100%)    GEN: NAD  Heart: RRR  Lung: CTAB  Abd: soft, nontender, MULU dressing c/d/i, +BS  SVE: deferred  Neuro: 2+ UE DTR b/l    labs  9.63>11.4/34.5<219, UPr/Cr 0.3, Cr 0.5, Uric acid 4.7, , ALT/AST 15/18  7/4: @1200 Mg 3.1  @1600 15.54>10.9/32.5<210, 136/4.0/102/20/3/0.5<120, AST/ALT 20/14, uric acid 5.1,   @2100 Mg 3.9    medications  Magnesium 2g/hr    MEDICATIONS  (STANDING):  acetaminophen   Tablet .. 975 milliGRAM(s) Oral <User Schedule>  docusate sodium 100 milliGRAM(s) Oral two times a day  enoxaparin Injectable 40 milliGRAM(s) SubCutaneous every 24 hours  ibuprofen  Tablet. 600 milliGRAM(s) Oral every 6 hours  simethicone 80 milliGRAM(s) Chew every 6 hours    MEDICATIONS  (PRN):  glycerin Suppository - Adult 1 Suppository(s) Rectal at bedtime PRN Constipation  lanolin Ointment 1 Application(s) Topical every 6 hours PRN Sore Nipples  magnesium hydroxide Suspension 30 milliLiter(s) Oral two times a day PRN Constipation  oxyCODONE    IR 5 milliGRAM(s) Oral every 3 hours PRN Moderate to Severe Pain (4-10)  oxyCODONE    IR 5 milliGRAM(s) Oral once PRN Moderate to Severe Pain (4-10)

## 2019-07-04 NOTE — PROGRESS NOTE ADULT - SUBJECTIVE AND OBJECTIVE BOX
Subjective:   Pt evaluated at bedside for magnesium check. She denies visual disturbances, headache and right upper quadrant pain. Also denies nausea/vomiting/chest pain/epigastric pain/shortness of breath. Pain well controlled.     Objective:   T(F): 98.9 (07-04 @ 04:17), Max: 98.9 (07-04 @ 04:17)  HR: 88 (07-04 @ 15:18)  BP: 115/74 (07-04 @ 15:18) (93/54 - 142/75)  RR: --  SpO2: 89% (07-04 @ 15:23)  Vital Signs Last 24 Hrs  BP: 115/74 (04 Jul 2019 15:18) (93/54 - 164/75)  Daily Height in cm: 157.48 (04 Jul 2019 01:15)    Daily Baby A: Weight (gm) Delivery: 2550 (04 Jul 2019 02:23)    07-03 @ 07:01  -  07-04 @ 07:00  --------------------------------------------------------  IN: 450 mL / OUT: 450 mL / NET: 0 mL    07-04 @ 07:01  -  07-04 @ 15:26  --------------------------------------------------------  IN: 600 mL / OUT: 560 mL / NET: 40 mL      Gen: NAD, AAOx3  CV: RRR, no M/R/G  Pulm: CTAB, no R/R/W  Abd: soft, nontender, no rebound or guarding, no epigastric tenderness  Incision: MULU dressing in place, 2x2 stain of blood in middle inferior border  : Burk in place with clear urine  Ext: +1 edema kwadwo, SCDs in place  Neuro: Reflexes 2+ bilaterally upper and lower    Medications:  acetaminophen   Tablet .. 975 milliGRAM(s) Oral <User Schedule>  docusate sodium 100 milliGRAM(s) Oral two times a day  enoxaparin Injectable 40 milliGRAM(s) SubCutaneous every 24 hours  glycerin Suppository - Adult 1 Suppository(s) Rectal at bedtime PRN  ibuprofen  Tablet. 600 milliGRAM(s) Oral every 6 hours  ketorolac   Injectable 30 milliGRAM(s) IV Push every 6 hours  lactated ringers. 1000 milliLiter(s) IV Continuous <Continuous>  lanolin Ointment 1 Application(s) Topical every 6 hours PRN  magnesium hydroxide Suspension 30 milliLiter(s) Oral two times a day PRN  magnesium sulfate Infusion 2.5 Gm/Hr IV Continuous <Continuous>  oxyCODONE    IR 5 milliGRAM(s) Oral every 3 hours PRN  oxyCODONE    IR 5 milliGRAM(s) Oral once PRN  oxytocin Infusion 333.333 milliUNIT(s)/Min IV Continuous <Continuous>  oxytocin Infusion 333.333 milliUNIT(s)/Min IV Continuous <Continuous>  simethicone 80 milliGRAM(s) Chew every 6 hours    Nyquil Cold Medicine (Other)      Labs:                        11.4   9.63  )-----------( 219      ( 03 Jul 2019 19:04 )             34.5     07-03    135  |  98  |  <3<L>  ----------------------------<  68<L>  3.5   |  22  |  0.5<L>    Ca    9.1      03 Jul 2019 19:04  Mg     3.1     07-04    TPro  6.4  /  Alb  3.3<L>  /  TBili  0.6  /  DBili  x   /  AST  18  /  ALT  15  /  AlkPhos  137<H>  07-03    Magnesium, Serum: 3.1 mg/dL (07-04 @ 12:40)  Uric Acid, Serum: 4.7 mg/dL (07-03 @ 19:04),

## 2019-07-04 NOTE — PROGRESS NOTE ADULT - ASSESSMENT
30 y/o P5 s/p repeat  at 34w6d for di/di twins and preeclampsia with severe features, POD 1, now with normal BPs, s/p magnesium for seizure prophylaxis, discontinue before 24 hours postpartum as BPs  and preeclampsia labs all normal    transfer to 4D  routine postpartum care  vitals q4    Dr. payan aware

## 2019-07-04 NOTE — OB PROVIDER H&P - NSHPPHYSICALEXAM_GEN_ALL_CORE
Physical exam:  Vital Signs Last 24 Hrs  T(F): 99.7 (03 Jul 2019 16:06), Max: 99.7 (03 Jul 2019 16:06)  HR: 83 (03 Jul 2019 17:21) (83 - 100)  BP: 112/72 (03 Jul 2019 17:21) (112/72 - 139/86)  RR: 19 (03 Jul 2019 16:06) (19 - 19)  Udip large ketones, mod leuks  Gen: NAD  Resp: CTA b/l  Cardio: RRR  Abdomen: Soft, nontender, gravid. no RUQ or epigastric tenderness, no palpable ctx  Neuro: +2 UE reflexes b/l  Ext: +2 pedal edema b/l. No calf tenderness or erythema.  VE: 1/L/-3, no lesions on speculum, babyA vertex by sono, babyB  speculum: cervix appears closed. no lesions, no bleeding or blood noted  EFM: baby A 130/mod/+accels, baby B 135/mod/+accels  toco: q 5min  Sono:   Twin A: vertex, post MVP 3.7cm, BPP 8/8   Twin B: complete breech, post placenta 4.98cm, BPP 8/8

## 2019-07-04 NOTE — PROGRESS NOTE ADULT - ASSESSMENT
28 y/o P5 s/p repeat  at 34w6d for di/di twins and preeclampsia with severe features, now with normal BPs, on magnesium for seizure prophylaxis,POD0, doing well.   - Mg level sub-therapeutic. Will increase Mg to 2.5 gr/hr  - c/w Mag until 24 hours postpartum  @0238  - BPs q15 min.  - strict I/Os  - neuro checks q2h  - pain management PRN  - seizure precautions  - postpartum CBC and preeclamptic labs @1600    Dr. Cervantes to be made aware.

## 2019-07-04 NOTE — OB PROVIDER H&P - NS_SONONOTE_OBGYN_ALL_OB_FT
Twin A: vertex, post placenta, MVP 3.7cm, BPP 8/8, Twin B: complete breech, post placenta, 4.98cm, BPP 8/8

## 2019-07-04 NOTE — BRIEF OPERATIVE NOTE - NSICDXBRIEFPROCEDURE_GEN_ALL_CORE_FT
PROCEDURES:  Intrauterine device insertion 2019 04:27:59  Jerrod Sheriff  Repeat low transverse  section 2019 04:26:21  Jerrod Sheriff

## 2019-07-04 NOTE — OB PROVIDER H&P - ASSESSMENT
28yo  at 34w6d GA, naturally conceived di/di twins, late transfer from [facility] at 21+ weeks, h/o previous C/S x2 for repeat  section, h/o HSV1, h/o infant demise at 4mo GA, with carpal tunnel syndrome; BPP 8/8x2, reassuring fetal and maternal status, not in  labor; with preeclampsia with severe features   - admit to L&D  - admission labs  - NPO/IVF   - on call to OR  - skin prep   - ancef 3g IV   - will start Mg for seizure prophylaxis postop   - f/u BPs     Dr. Hatch and Dr. Tinoco aware. 28yo  at 34w6d GA, naturally conceived di/di twins, late transfer from [facility] at 21+ weeks, h/o previous C/S x2 for repeat  section, h/o HSV1, h/o infant demise at 4mo GA, with carpal tunnel syndrome; BPP 8/8x2, reassuring fetal status, not in  labor; with preeclampsia with severe features   - admit to L&D  - admission labs  - NPO/IVF   - on call to OR  - skin prep   - ancef 3g IV   - will start Mg for seizure prophylaxis  - f/u BPs     Dr. Hatch and Dr. Tinoco aware.

## 2019-07-05 ENCOUNTER — TRANSCRIPTION ENCOUNTER (OUTPATIENT)
Age: 29
End: 2019-07-05

## 2019-07-05 LAB
C TRACH RRNA SPEC QL NAA+PROBE: SIGNIFICANT CHANGE UP
GP B STREP DNA SPEC QL NAA+PROBE: DETECTED
GP B STREP DNA SPEC QL NAA+PROBE: NORMAL
GROUP B BETA STREP DNA (PCR): DETECTED
GROUP B BETA STREP INTERPRETATION: SIGNIFICANT CHANGE UP
N GONORRHOEA RRNA SPEC QL NAA+PROBE: SIGNIFICANT CHANGE UP
SOURCE GBS: NORMAL
SOURCE GROUP B STREP: SIGNIFICANT CHANGE UP
SPECIMEN SOURCE: SIGNIFICANT CHANGE UP

## 2019-07-05 PROCEDURE — 99231 SBSQ HOSP IP/OBS SF/LOW 25: CPT

## 2019-07-05 RX ORDER — IBUPROFEN 200 MG
600 TABLET ORAL EVERY 6 HOURS
Refills: 0 | Status: DISCONTINUED | OUTPATIENT
Start: 2019-07-05 | End: 2019-07-07

## 2019-07-05 RX ADMIN — Medication 975 MILLIGRAM(S): at 20:39

## 2019-07-05 RX ADMIN — SIMETHICONE 80 MILLIGRAM(S): 80 TABLET, CHEWABLE ORAL at 00:26

## 2019-07-05 RX ADMIN — Medication 600 MILLIGRAM(S): at 12:50

## 2019-07-05 RX ADMIN — Medication 600 MILLIGRAM(S): at 18:14

## 2019-07-05 RX ADMIN — Medication 975 MILLIGRAM(S): at 08:48

## 2019-07-05 RX ADMIN — Medication 975 MILLIGRAM(S): at 15:56

## 2019-07-05 RX ADMIN — Medication 100 MILLIGRAM(S): at 06:01

## 2019-07-05 RX ADMIN — Medication 100 MILLIGRAM(S): at 00:23

## 2019-07-05 RX ADMIN — Medication 600 MILLIGRAM(S): at 06:30

## 2019-07-05 RX ADMIN — SIMETHICONE 80 MILLIGRAM(S): 80 TABLET, CHEWABLE ORAL at 00:25

## 2019-07-05 RX ADMIN — SIMETHICONE 80 MILLIGRAM(S): 80 TABLET, CHEWABLE ORAL at 18:14

## 2019-07-05 RX ADMIN — SIMETHICONE 80 MILLIGRAM(S): 80 TABLET, CHEWABLE ORAL at 12:50

## 2019-07-05 RX ADMIN — ENOXAPARIN SODIUM 40 MILLIGRAM(S): 100 INJECTION SUBCUTANEOUS at 15:56

## 2019-07-05 RX ADMIN — Medication 600 MILLIGRAM(S): at 06:00

## 2019-07-05 RX ADMIN — SIMETHICONE 80 MILLIGRAM(S): 80 TABLET, CHEWABLE ORAL at 06:01

## 2019-07-05 RX ADMIN — Medication 100 MILLIGRAM(S): at 18:14

## 2019-07-05 NOTE — DISCHARGE NOTE NURSING/CASE MANAGEMENT/SOCIAL WORK - NSDCDPATPORTLINK_GEN_ALL_CORE
You can access the [facility] Patient Portal, offered by [facility], by registering with the following website: [email or web address]

## 2019-07-05 NOTE — PROGRESS NOTE ADULT - ASSESSMENT
30 y/o P5 s/p repeat  at 34w6d for di/di twins and preeclampsia with severe features, POD 1, now with normal BPs, s/p magnesium for seizure prophylaxis, recovering well    transfer to 4D  routine postpartum care  vitals q4    Dr. payan aware 28 y/o P5 s/p repeat  at 34w6d for di/di twins and preeclampsia with severe features, POD 1, now with normal BPs, s/p magnesium for seizure prophylaxis, recovering well    routine postpartum care  f/u BPs  ambulation  lovenox qhs  pain managment  regular diet    Dr. payan aware

## 2019-07-05 NOTE — PROGRESS NOTE ADULT - SUBJECTIVE AND OBJECTIVE BOX
PGY 4 Post Partum note    Subjective: no complaints. Denies headache, blurry vision, chest pain, SOB/epigastric pain.    Pumping  Flatus: yes  BM: no  Voiding: yes    Physical exam:    Vital Signs Last 24 Hrs  T(C): 36.7 (05 Jul 2019 04:15), Max: 37 (04 Jul 2019 13:00)  T(F): 98.1 (05 Jul 2019 04:15), Max: 98.6 (04 Jul 2019 13:00)  HR: 80 (05 Jul 2019 04:15) (51 - 150)  BP: 117/67 (05 Jul 2019 04:15) (93/54 - 132/58)  BP(mean): --  RR: 18 (05 Jul 2019 04:15) (17 - 18)  SpO2: 97% (04 Jul 2019 23:12) (60% - 100%)    Gen: NAD  CVS: RRR  Lungs: CTAB  Abdomen: Soft, nontender, no distension , firm uterine fundus at umbilicus, MULU dressing on, lightly stained  Pelvic: Normal lochia noted  Ext: No calf tenderness    Diet: regular    meds:   acetaminophen   Tablet ..   975 milliGRAM(s) Oral (07-04-19 @ 12:07)    docusate sodium   100 milliGRAM(s) Oral (07-05-19 @ 06:01)   100 milliGRAM(s) Oral (07-05-19 @ 00:23)    enoxaparin Injectable   40 milliGRAM(s) SubCutaneous (07-04-19 @ 19:22)    ibuprofen  Tablet.   600 milliGRAM(s) Oral (07-05-19 @ 06:00)    ketorolac   Injectable   30 milliGRAM(s) IV Push (07-04-19 @ 19:21)   30 milliGRAM(s) IV Push (07-04-19 @ 09:38)    simethicone   80 milliGRAM(s) Chew (07-05-19 @ 06:01)   80 milliGRAM(s) Chew (07-05-19 @ 00:26)   80 milliGRAM(s) Chew (07-05-19 @ 00:25)        LABS:                        10.9   15.54 )-----------( 210      ( 04 Jul 2019 16:20 )             32.5                         11.4   9.63  )-----------( 219      ( 03 Jul 2019 19:04 )             34.5     Magnesium, Serum: 3.9 mg/dL (07-04 @ 21:00)  Magnesium, Serum: 3.1 mg/dL (07-04 @ 12:40)    07-04-19 @ 16:20      136  |  102  |  <3<L>  ----------------------------<  120<H>  4.0   |  20  |  0.5<L>    07-03-19 @ 19:04      135  |  98  |  <3<L>  ----------------------------<  68<L>  3.5   |  22  |  0.5<L>        Ca    8.4<L>      04 Jul 2019 16:20  Ca    9.1      03 Jul 2019 19:04  Mg     3.9<HH>     07-04  Mg     3.1<HH>     07-04    TPro  5.4<L>  /  Alb  3.0<L>  /  TBili  0.4  /  DBili  x   /  AST  20  /  ALT  14  /  AlkPhos  115  07-04-19 @ 16:20  TPro  6.4  /  Alb  3.3<L>  /  TBili  0.6  /  DBili  x   /  AST  18  /  ALT  15  /  AlkPhos  137<H>  07-03-19 @ 19:04

## 2019-07-05 NOTE — DISCHARGE NOTE NURSING/CASE MANAGEMENT/SOCIAL WORK - NSSCNAMETXT_GEN_ALL_CORE
[facility] home [facility]

## 2019-07-06 ENCOUNTER — TRANSCRIPTION ENCOUNTER (OUTPATIENT)
Age: 29
End: 2019-07-06

## 2019-07-06 PROCEDURE — 99238 HOSP IP/OBS DSCHRG MGMT 30/<: CPT

## 2019-07-06 RX ORDER — SIMETHICONE 80 MG/1
1 TABLET, CHEWABLE ORAL
Qty: 0 | Refills: 0 | DISCHARGE
Start: 2019-07-06

## 2019-07-06 RX ORDER — DOCUSATE SODIUM 100 MG
1 CAPSULE ORAL
Qty: 0 | Refills: 0 | DISCHARGE
Start: 2019-07-06

## 2019-07-06 RX ORDER — IBUPROFEN 200 MG
1 TABLET ORAL
Qty: 0 | Refills: 0 | DISCHARGE
Start: 2019-07-06

## 2019-07-06 RX ORDER — ACETAMINOPHEN 500 MG
3 TABLET ORAL
Qty: 0 | Refills: 0 | DISCHARGE
Start: 2019-07-06

## 2019-07-06 RX ADMIN — SIMETHICONE 80 MILLIGRAM(S): 80 TABLET, CHEWABLE ORAL at 00:38

## 2019-07-06 RX ADMIN — Medication 100 MILLIGRAM(S): at 06:17

## 2019-07-06 RX ADMIN — Medication 975 MILLIGRAM(S): at 22:33

## 2019-07-06 RX ADMIN — SIMETHICONE 80 MILLIGRAM(S): 80 TABLET, CHEWABLE ORAL at 11:47

## 2019-07-06 RX ADMIN — SIMETHICONE 80 MILLIGRAM(S): 80 TABLET, CHEWABLE ORAL at 17:22

## 2019-07-06 RX ADMIN — Medication 600 MILLIGRAM(S): at 11:47

## 2019-07-06 RX ADMIN — ENOXAPARIN SODIUM 40 MILLIGRAM(S): 100 INJECTION SUBCUTANEOUS at 15:00

## 2019-07-06 RX ADMIN — SIMETHICONE 80 MILLIGRAM(S): 80 TABLET, CHEWABLE ORAL at 06:17

## 2019-07-06 RX ADMIN — Medication 600 MILLIGRAM(S): at 00:38

## 2019-07-06 RX ADMIN — Medication 100 MILLIGRAM(S): at 17:21

## 2019-07-06 RX ADMIN — Medication 975 MILLIGRAM(S): at 15:00

## 2019-07-06 RX ADMIN — Medication 975 MILLIGRAM(S): at 09:41

## 2019-07-06 RX ADMIN — Medication 600 MILLIGRAM(S): at 06:17

## 2019-07-06 RX ADMIN — Medication 600 MILLIGRAM(S): at 17:21

## 2019-07-06 NOTE — PROGRESS NOTE ADULT - SUBJECTIVE AND OBJECTIVE BOX
MONICA LEWIS  29y  Female    PGY1 Note:  Patient seen and examined at bedside for complaints of abdominal pain.  Reports -BM, +flatus.  Some improvement after pain medication. Patient desires to stay an additional day due to lack of "help at home."    Physical Exam  Vital Signs Last 24 Hrs  T(C): 36.1 (2019 16:54), Max: 37.2 (2019 03:39)  T(F): 97 (2019 16:54), Max: 99 (2019 03:39)  HR: 74 (2019 16:54) (74 - 113)  BP: 124/76 (2019 16:54) (107/62 - 126/63)  RR: 20 (2019 16:54) (18 - 20)  SpO2: 98% (2019 16:54) (98% - 98%)    Gen: NAD, sitting comfortably  CV: RRR. No murmurs gallops or rubs.  Pulm: CTAB. No wheezes or rales.  Ext: No calf tenderness, no swelling.   Abd: Nondistended, soft, BS+, RUQ tenderness, no rebound tenderness  Fundus firm, and below umbilicus.   Lochia: Minimal, rubra  Wound: MULU in place.    MEDICATIONS  (STANDING):  acetaminophen   Tablet .. 975 milliGRAM(s) Oral <User Schedule>  docusate sodium 100 milliGRAM(s) Oral two times a day  enoxaparin Injectable 40 milliGRAM(s) SubCutaneous every 24 hours  ibuprofen  Tablet. 600 milliGRAM(s) Oral every 6 hours  oxytocin Infusion 333.333 milliUNIT(s)/Min (1000 mL/Hr) IV Continuous <Continuous>  simethicone 80 milliGRAM(s) Chew every 6 hours    MEDICATIONS  (PRN):  glycerin Suppository - Adult 1 Suppository(s) Rectal at bedtime PRN Constipation  lanolin Ointment 1 Application(s) Topical every 6 hours PRN Sore Nipples  magnesium hydroxide Suspension 30 milliLiter(s) Oral two times a day PRN Constipation  oxyCODONE    IR 5 milliGRAM(s) Oral every 3 hours PRN Moderate to Severe Pain (4-10)  oxyCODONE    IR 5 milliGRAM(s) Oral once PRN Moderate to Severe Pain (4-10)      PAST MEDICAL & SURGICAL HISTORY:  No pertinent past medical history  H/O cone biopsy of cervix  History of D&C: dilation and evacuation  Delivery by  section of full-term infant      Diet: regular    Labs:                        10.9   15.54 )-----------( 210      ( 2019 16:20 )             32.5                         11.4   9.63  )-----------( 219      ( 2019 19:04 )             34.5

## 2019-07-06 NOTE — PROGRESS NOTE ADULT - ASSESSMENT
A/P: 28 y/o P5 s/p repeat  at 34w6d for di/di twins and preeclampsia with severe features, POD#2, now with normal BPs, s/p magnesium for seizure prophylaxis, recovering well  -encourage ambulation  -PO hydration  -regular diet  -lovenox  -Incentive Spirometry bedside   -pain management prn  -cleared by social work  -anticipate d/c home is today  -home with VNS  -instructed to follow up in 1 week for incision check and BP check, and 6 weeks for postpartum check     Dr. Agosto and Dr. Fernandez to be made aware. A/P: 28 y/o P5 s/p repeat  at 34w6d for di/di twins and preeclampsia with severe features, POD#2, now with normal BPs, s/p magnesium for seizure prophylaxis, PP Paraguard IUD insertion, recovering well  -encourage ambulation  -PO hydration  -regular diet  -lovenox  -Incentive Spirometry bedside   -pain management prn  -cleared by social work  -anticipate d/c home is today  -home with VNS  -instructed to follow up in 1 week for incision check and BP check, and 6 weeks for postpartum check     Dr. Agosto and Dr. Fernandez to be made aware.

## 2019-07-06 NOTE — DISCHARGE NOTE OB - CARE PROVIDER_API CALL
Jose Blackburn)  MaternalFetal Medicine; Obstetrics and Gynecology  [address] [place] [address]  Phone: [phone]  Fax: [phone]  Follow Up Time: Jose Blackburn)  MaternalFetal Medicine; Obstetrics and Gynecology  [address] [place] [address]  Phone: [phone]  Fax: [phone]  Follow Up Time: Jose Blackburn)  MaternalFetal Medicine; Obstetrics and Gynecology  [address] [place] [address]  Phone: [phone]  Fax: [phone]  Follow Up Time:

## 2019-07-06 NOTE — DISCHARGE NOTE OB - MEDICATION SUMMARY - MEDICATIONS TO STOP TAKING
I will STOP taking the medications listed below when I get home from the hospital:    Antivert 25 mg oral tablet  -- 1 tab(s) by mouth once a day   -- May cause drowsiness.  Alcohol may intensify this effect.  Use care when operating dangerous machinery.

## 2019-07-06 NOTE — DISCHARGE NOTE OB - PATIENT PORTAL LINK FT
You can access the [facility] Patient Portal, offered by [facility], by registering with the following website: [email or web address]

## 2019-07-06 NOTE — PROGRESS NOTE ADULT - ASSESSMENT
A/P: 29y yo P5 s/p repeat  for di/di twins and preeclampsia with severe features, POD#2, now with normal BPs, s/p magnesium for seizure prophylaxis, PP Paraguard IUD insertion, recovering well  -continue current management  -pain management prn  -encourage colace/simethicone  -D/C home tomorrow with VNS services      Dr. Agosto aware, Dr. Fernandez to be made aware.

## 2019-07-06 NOTE — DISCHARGE NOTE OB - ADDITIONAL INSTRUCTIONS
Nothing in the vagina for 6 weeks (no sex, no tampons, no douching). Avoid tub baths, you may shower.  If you have a fever of 100.4F or greater, severe vaginal bleeding, or severe abdominal pain, call your Ob/Gyn or come to the emergency department immediately.  If you have a blood pressure of 160/110 or higher, call your Ob/Gyn or come to the emergency department. Follow up with provider in 1 week for incision and BP check, and in 6 weeks for postpartum visit.

## 2019-07-06 NOTE — DISCHARGE NOTE OB - CARE PLAN
Principal Discharge DX:	 delivery delivered  Goal:	Healthy recovery  Assessment and plan of treatment:	Monitor vitals and labs. Follow up with provider in 1 week for incision and BP check, and in 6 weeks for postpartum visit.  Secondary Diagnosis:	Preeclampsia, severe  Goal:	Asymptomatic, BPs controlled  Assessment and plan of treatment:	Monitor vitals and labs. Follow up with provider in 1 week for incision and BP check, and in 6 weeks for postpartum visit. Home with VNS.

## 2019-07-06 NOTE — DISCHARGE NOTE OB - MEDICATION SUMMARY - MEDICATIONS TO TAKE
I will START or STAY ON the medications listed below when I get home from the hospital:    acetaminophen 325 mg oral tablet  -- 3 tab(s) by mouth   -- Indication: For pain    ibuprofen 600 mg oral tablet  -- 1 tab(s) by mouth every 6 hours  -- Indication: For pain    docusate sodium 100 mg oral capsule  -- 1 cap(s) by mouth 2 times a day  -- Indication: For constipation    simethicone 80 mg oral tablet, chewable  -- 1 tab(s) by mouth every 6 hours  -- Indication: For gas

## 2019-07-06 NOTE — DISCHARGE NOTE OB - HOSPITAL COURSE
19 @ 08:55    HPI:  28yo  at 34w5d by 7w sonogram, naturally conceived di-di twins, presents to labor and delivery for blood pressure monitoring. She was sent from [facility] on  after having blood pressures 161/90 -> 135/89; she has been taking her blood pressure at home and it ranges from 132-143/84-91. She reports increased swelling in the legs bilaterally, specially after prolonged standing. Denies headache, visual changes, chest pain, N/V, SOB, RUQ or epigastric pain. She also reports contractions since presentation to labor and delivery @1545, feeling them q5-10min, 4/10 in intensity. Denies LOF, vaginal bleeding. Good fetal movements. This pregnancy she follows with high risk for twins and h/o PIH. She has history of PIH in all of her pregnancies. Denies being on medications or requiring magnesium. She was started on ASA but has been taking it inconsistently, last taken today at 1100. She has history of genital herpes, last outbreak 9 years ago, denies prodromal symptoms. Plan to start valtrex for suppression at 36w. Denies any other complications in this pregnancy. H/o  x 2 followed by C/S x 2, will have repeat C/S this pregnancy.       PAST MEDICAL & SURGICAL HISTORY:  No pertinent past medical history  H/O cone biopsy of cervix  History of D&C: dilation and evacuation  Delivery by  section of full-term infant      POST PARTUM COURSE: C/S #2 for preeclampsia with severe features, s/p magnesium, POD#2, delivered di/di twins.  Postpartum has remained asymptomatic with BPs wnl.  Postpartum course uncomplicated, discharge home on POD#2 in stable condition.         LABS:                        10.9   15.54 )-----------( 210      ( 2019 16:20 )             32.5       19 @ 16:20    Pre CBC 9.63>11.3/34.5<219      136  |  102  |  <3<L>  ----------------------------<  120<H>  4.0   |  20  |  0.5<L>    19 @ 19:04      135  |  98  |  <3<L>  ----------------------------<  68<L>  3.5   |  22  |  0.5<L>      Ca    8.4<L>      2019 16:20  Ca    9.1      2019 19:04  Mg     3.9<HH>       Mg     3.1<HH>         TPro  5.4<L>  /  Alb  3.0<L>  /  TBili  0.4  /  DBili  x   /  AST  20  /  ALT  14  /  AlkPhos  115  19 @ 16:20  TPro  6.4  /  Alb  3.3<L>  /  TBili  0.6  /  DBili  x   /  AST  18  /  ALT  15  /  AlkPhos  137<H>  19 @ 19:04 19 @ 08:55    HPI:  30yo  at 34w5d by 7w sonogram, naturally conceived di-di twins, presents to labor and delivery for blood pressure monitoring. She was sent from [facility] on  after having blood pressures 161/90 -> 135/89; she has been taking her blood pressure at home and it ranges from 132-143/84-91. She reports increased swelling in the legs bilaterally, specially after prolonged standing. Denies headache, visual changes, chest pain, N/V, SOB, RUQ or epigastric pain. She also reports contractions since presentation to labor and delivery @1545, feeling them q5-10min, 4/10 in intensity. Denies LOF, vaginal bleeding. Good fetal movements. This pregnancy she follows with high risk for twins and h/o PIH. She has history of PIH in all of her pregnancies. Denies being on medications or requiring magnesium. She was started on ASA but has been taking it inconsistently, last taken today at 1100. She has history of genital herpes, last outbreak 9 years ago, denies prodromal symptoms. Plan to start valtrex for suppression at 36w. Denies any other complications in this pregnancy. H/o  x 2 followed by C/S x 2, will have repeat C/S this pregnancy.       PAST MEDICAL & SURGICAL HISTORY:  No pertinent past medical history  H/O cone biopsy of cervix  History of D&C: dilation and evacuation  Delivery by  section of full-term infant      POST PARTUM COURSE: C/S #2 for preeclampsia with severe features, s/p magnesium, POD#2, delivered di/di twins.  Received Paraguard IUD postpartum. Postpartum has remained asymptomatic with BPs wnl.  Postpartum course uncomplicated, discharge home on POD#2 in stable condition.         LABS:                        10.9   15.54 )-----------( 210      ( 2019 16:20 )             32.5       19 @ 16:20    Pre CBC 9.63>11.3/34.5<219      136  |  102  |  <3<L>  ----------------------------<  120<H>  4.0   |  20  |  0.5<L>    19 @ 19:04      135  |  98  |  <3<L>  ----------------------------<  68<L>  3.5   |  22  |  0.5<L>      Ca    8.4<L>      2019 16:20  Ca    9.1      2019 19:04  Mg     3.9<HH>       Mg     3.1<HH>         TPro  5.4<L>  /  Alb  3.0<L>  /  TBili  0.4  /  DBili  x   /  AST  20  /  ALT  14  /  AlkPhos  115  19 @ 16:20  TPro  6.4  /  Alb  3.3<L>  /  TBili  0.6  /  DBili  x   /  AST  18  /  ALT  15  /  AlkPhos  137<H>  19 @ 19:04

## 2019-07-06 NOTE — DISCHARGE NOTE OB - PLAN OF CARE
Healthy recovery Monitor vitals and labs. Follow up with provider in 1 week for incision and BP check, and in 6 weeks for postpartum visit. Asymptomatic, BPs controlled Monitor vitals and labs. Follow up with provider in 1 week for incision and BP check, and in 6 weeks for postpartum visit. Home with VNS.

## 2019-07-06 NOTE — PROGRESS NOTE ADULT - SUBJECTIVE AND OBJECTIVE BOX
MONICA LEWIS  29y  Female    PGY1 Note:  Patient seen and examined bedside. No overnight events. Denies heavy vaginal bleeding. Pain well controlled. Ambulating, voiding, tolerating diet, +flatus. Reports no BM. Breastfeeding. Denies headaches, changes in vision, chest pain, SOB, RUQ/Epigastric pain, nausea/vomiting, fevers, chills, diarrhea, LE pain/ swelling.     Physical Exam  Vital Signs Last 24 Hrs  T(C): 36.9 (2019 08:10), Max: 37.2 (2019 03:39)  T(F): 98.4 (2019 08:10), Max: 99 (2019 03:39)  HR: 84 (2019 08:10) (80 - 113)  at 0020, wnl since then  BP: 123/64 (2019 08:10) (107/58 - 126/63)  RR: 20 (2019 08:10) (18 - 20)      Gen: NAD, sitting comfortably  CV: RRR. No murmurs gallops or rubs.  Pulm: CTAB. No wheezes or rales.  Ext: No calf tenderness, 2+ BL LE swelling.   Abd: Nondistended, Soft, nontender, BS+  Fundus firm, and below umbilicus.   Lochia: Minimal, rubra  Wound: MULU dressing changed. Incision clean, dry intact. Steris in place. No surrounding edema or erythema.      MEDICATIONS  (STANDING):  acetaminophen   Tablet .. 975 milliGRAM(s) Oral <User Schedule>  docusate sodium 100 milliGRAM(s) Oral two times a day  enoxaparin Injectable 40 milliGRAM(s) SubCutaneous every 24 hours  ibuprofen  Tablet. 600 milliGRAM(s) Oral every 6 hours  oxytocin Infusion 333.333 milliUNIT(s)/Min (1000 mL/Hr) IV Continuous <Continuous>  simethicone 80 milliGRAM(s) Chew every 6 hours    MEDICATIONS  (PRN):  glycerin Suppository - Adult 1 Suppository(s) Rectal at bedtime PRN Constipation  lanolin Ointment 1 Application(s) Topical every 6 hours PRN Sore Nipples  magnesium hydroxide Suspension 30 milliLiter(s) Oral two times a day PRN Constipation  oxyCODONE    IR 5 milliGRAM(s) Oral every 3 hours PRN Moderate to Severe Pain (4-10)  oxyCODONE    IR 5 milliGRAM(s) Oral once PRN Moderate to Severe Pain (4-10)      PAST MEDICAL & SURGICAL HISTORY:  H/O cone biopsy of cervix  History of D&C: dilation and evacuation  Delivery by  section of full-term infant      Diet: Regular    Labs:                        10.9   15.54 )-----------( 210      ( 2019 16:20 )             32.5                         11.4   9.63  )-----------( 219      ( 2019 19:04 )             34.5

## 2019-07-07 VITALS
RESPIRATION RATE: 18 BRPM | HEART RATE: 91 BPM | DIASTOLIC BLOOD PRESSURE: 78 MMHG | SYSTOLIC BLOOD PRESSURE: 125 MMHG | TEMPERATURE: 98 F

## 2019-07-07 PROCEDURE — 99238 HOSP IP/OBS DSCHRG MGMT 30/<: CPT

## 2019-07-07 RX ADMIN — SIMETHICONE 80 MILLIGRAM(S): 80 TABLET, CHEWABLE ORAL at 12:12

## 2019-07-07 RX ADMIN — Medication 975 MILLIGRAM(S): at 04:08

## 2019-07-07 RX ADMIN — SIMETHICONE 80 MILLIGRAM(S): 80 TABLET, CHEWABLE ORAL at 06:26

## 2019-07-07 RX ADMIN — Medication 600 MILLIGRAM(S): at 12:12

## 2019-07-07 RX ADMIN — Medication 100 MILLIGRAM(S): at 06:26

## 2019-07-07 RX ADMIN — Medication 600 MILLIGRAM(S): at 00:19

## 2019-07-07 RX ADMIN — Medication 975 MILLIGRAM(S): at 09:15

## 2019-07-07 RX ADMIN — Medication 600 MILLIGRAM(S): at 06:26

## 2019-07-07 RX ADMIN — SIMETHICONE 80 MILLIGRAM(S): 80 TABLET, CHEWABLE ORAL at 00:18

## 2019-07-07 NOTE — PROGRESS NOTE ADULT - ASSESSMENT
A/P: 28yo P5 s/p C/S at 34w6d for di/di twins and preeclampsia with severe features, POD#3, now with normal BPs s/p magnesium for seizure prophylaxis. PP Paraguard IUD insertion, recovering well.    - encourage ambulation  - pain management PRN  - PO hydration  - regular diet  - lovenox for DVT prophylaxis   - Incentive Spirometry bedside   - anticipate d/c home is today with VNS services   - instructed to follow up in 1 week for incision check, and 6 weeks for postpartum check     Dr. Oreilly and Dr. Cervantes to be made aware.

## 2019-07-07 NOTE — PROGRESS NOTE ADULT - SUBJECTIVE AND OBJECTIVE BOX
MONICA LEWIS  29y  Female    PGY1 Note:  Patient seen and examined bedside. No overnight events. Denies heavy vaginal bleeding and reports pain well controlled. Ambulating without difficulty. Tolerating Diet. Reports +flatus. Reports no BM. Breastfeeding. Denies any headaches, change in vision, chest pain, SOB, RUQ pain, fevers, chills, nausea/vomiting, LE pain.      MEDICATIONS  (STANDING):  acetaminophen   Tablet .. 975 milliGRAM(s) Oral <User Schedule>  docusate sodium 100 milliGRAM(s) Oral two times a day  enoxaparin Injectable 40 milliGRAM(s) SubCutaneous every 24 hours  ibuprofen  Tablet. 600 milliGRAM(s) Oral every 6 hours  oxytocin Infusion 333.333 milliUNIT(s)/Min (1000 mL/Hr) IV Continuous <Continuous>  simethicone 80 milliGRAM(s) Chew every 6 hours    MEDICATIONS  (PRN):  glycerin Suppository - Adult 1 Suppository(s) Rectal at bedtime PRN Constipation  lanolin Ointment 1 Application(s) Topical every 6 hours PRN Sore Nipples  magnesium hydroxide Suspension 30 milliLiter(s) Oral two times a day PRN Constipation  oxyCODONE    IR 5 milliGRAM(s) Oral every 3 hours PRN Moderate to Severe Pain (4-10)  oxyCODONE    IR 5 milliGRAM(s) Oral once PRN Moderate to Severe Pain (4-10)      PAST MEDICAL & SURGICAL HISTORY:  No pertinent past medical history  H/O cone biopsy of cervix  History of D&C: dilation and evacuation  Delivery by  section of full-term infant      Physical Exam  Vital Signs Last 24 Hrs  T(C): 36.1 (2019 03:46), Max: 36.9 (2019 08:10)  T(F): 97 (2019 03:46), Max: 98.4 (2019 08:10)  HR: 69 (2019 03:46) (69 - 105)  BP: 110/61 (2019 03:46) (107/62 - 126/65)  RR: 18 (2019 03:46) (18 - 20)  SpO2: 98% (2019 16:54) (98% - 98%)  Gen: AAOx3, NAD  CV: RRR. No murmors gallops or rubs.  Pulm: CTAB. No wheezes or rales.  Ext: No calf tenderness, 2+ b/l LE edema  Abd: Soft, nontender, nondistended, BS+  Fundus firm, and below umbilicus. Nontender.   Lochia: Minimal, rubria  Wound: incision clean, dry intact. Steris in place. No surrounding edema or erythema.       Labs:  Urine output:                         10.9   15.54 )-----------( 210      ( 2019 16:20 )             32.5                         11.4   9.63  )-----------( 219      ( 2019 19:04 )             34.5

## 2019-07-08 ENCOUNTER — APPOINTMENT (OUTPATIENT)
Dept: ANTEPARTUM | Facility: CLINIC | Age: 29
End: 2019-07-08

## 2019-07-08 DIAGNOSIS — O30.043 TWIN PREGNANCY, DICHORIONIC/DIAMNIOTIC, THIRD TRIMESTER: ICD-10-CM

## 2019-07-08 DIAGNOSIS — Z71.89 OTHER SPECIFIED COUNSELING: ICD-10-CM

## 2019-07-08 DIAGNOSIS — O99.213 OBESITY COMPLICATING PREGNANCY, THIRD TRIMESTER: ICD-10-CM

## 2019-07-08 DIAGNOSIS — Z3A.34 34 WEEKS GESTATION OF PREGNANCY: ICD-10-CM

## 2019-07-08 DIAGNOSIS — O26.899 OTHER SPECIFIED PREGNANCY RELATED CONDITIONS, UNSPECIFIED TRIMESTER: ICD-10-CM

## 2019-07-09 LAB — SURGICAL PATHOLOGY STUDY: SIGNIFICANT CHANGE UP

## 2019-07-12 DIAGNOSIS — O30.043 TWIN PREGNANCY, DICHORIONIC/DIAMNIOTIC, THIRD TRIMESTER: ICD-10-CM

## 2019-07-12 DIAGNOSIS — O34.211 MATERNAL CARE FOR LOW TRANSVERSE SCAR FROM PREVIOUS CESAREAN DELIVERY: ICD-10-CM

## 2019-07-12 DIAGNOSIS — Z3A.34 34 WEEKS GESTATION OF PREGNANCY: ICD-10-CM

## 2019-07-12 DIAGNOSIS — A60.00 HERPESVIRAL INFECTION OF UROGENITAL SYSTEM, UNSPECIFIED: ICD-10-CM

## 2019-07-15 ENCOUNTER — APPOINTMENT (OUTPATIENT)
Dept: ANTEPARTUM | Facility: CLINIC | Age: 29
End: 2019-07-15

## 2019-07-22 ENCOUNTER — APPOINTMENT (OUTPATIENT)
Dept: ANTEPARTUM | Facility: CLINIC | Age: 29
End: 2019-07-22

## 2019-07-29 ENCOUNTER — APPOINTMENT (OUTPATIENT)
Dept: ANTEPARTUM | Facility: CLINIC | Age: 29
End: 2019-07-29

## 2019-07-30 ENCOUNTER — OUTPATIENT (OUTPATIENT)
Dept: OUTPATIENT SERVICES | Facility: HOSPITAL | Age: 29
LOS: 1 days | Discharge: HOME | End: 2019-07-30

## 2019-07-30 ENCOUNTER — APPOINTMENT (OUTPATIENT)
Dept: ANTEPARTUM | Facility: CLINIC | Age: 29
End: 2019-07-30
Payer: MEDICAID

## 2019-07-30 VITALS
TEMPERATURE: 98.6 F | OXYGEN SATURATION: 96 % | SYSTOLIC BLOOD PRESSURE: 129 MMHG | DIASTOLIC BLOOD PRESSURE: 85 MMHG | HEART RATE: 72 BPM

## 2019-07-30 DIAGNOSIS — Z98.890 OTHER SPECIFIED POSTPROCEDURAL STATES: Chronic | ICD-10-CM

## 2019-07-30 NOTE — HISTORY OF PRESENT ILLNESS
[Postpartum Follow Up] : postpartum follow up [Delivery Date: ___] : on [unfilled] [Repeat C/S] : delivered by  section (repeat) [BF with Difficulty] : nursing with difficulty [None] : No associated symptoms are reported [Clean/Dry/Intact] : clean, dry and intact [Erythema] : erythematous [Awake] : awake [Alert] : alert [Soft] : soft [Oriented x3] : oriented to person, place, and time [RRR, No Murmurs] : RRR, no murmurs [CTAB] : CTAB [Swelling] : not swollen [Dehiscence] : not dehisced [Distended] : not distended [Acute Distress] : no acute distress [Tender] : non tender [Depressed Mood] : not depressed [Flat Affect] : affect not flat [de-identified] : Still has incisional pain.  hand numbness and tingling bilaterally. [FreeTextEntry9] : tanner twins PTL, s/p  delivery and IUD placement 2019 @ 35 weeks gestation.  preeclampsia with severe features, was on magnesium sulfate. [de-identified] : Needs to follow up with PCP/Orthopedics ASAP for carpal tunnel syndrome.  F/u three weeks for postpartum visit.  Still has some pain, will send for CT scan.  I do not want to prescribe narcotics 3 weeks out.  Will check CBC and CMP.

## 2019-08-02 DIAGNOSIS — Z98.891 HISTORY OF UTERINE SCAR FROM PREVIOUS SURGERY: ICD-10-CM

## 2019-08-02 DIAGNOSIS — G56.00 CARPAL TUNNEL SYNDROME, UNSPECIFIED UPPER LIMB: ICD-10-CM

## 2019-08-05 ENCOUNTER — APPOINTMENT (OUTPATIENT)
Dept: ANTEPARTUM | Facility: CLINIC | Age: 29
End: 2019-08-05

## 2019-08-12 ENCOUNTER — APPOINTMENT (OUTPATIENT)
Dept: ANTEPARTUM | Facility: CLINIC | Age: 29
End: 2019-08-12

## 2019-08-22 ENCOUNTER — RESULT CHARGE (OUTPATIENT)
Age: 29
End: 2019-08-22

## 2019-08-22 ENCOUNTER — APPOINTMENT (OUTPATIENT)
Dept: ANTEPARTUM | Facility: CLINIC | Age: 29
End: 2019-08-22
Payer: MEDICAID

## 2019-08-22 ENCOUNTER — OUTPATIENT (OUTPATIENT)
Dept: OUTPATIENT SERVICES | Facility: HOSPITAL | Age: 29
LOS: 1 days | Discharge: HOME | End: 2019-08-22

## 2019-08-22 DIAGNOSIS — Z98.890 OTHER SPECIFIED POSTPROCEDURAL STATES: Chronic | ICD-10-CM

## 2019-08-22 PROCEDURE — ZZZZZ: CPT

## 2019-08-22 PROCEDURE — 99214 OFFICE O/P EST MOD 30 MIN: CPT | Mod: 25

## 2019-08-22 PROCEDURE — 76830 TRANSVAGINAL US NON-OB: CPT | Mod: 26

## 2019-08-22 NOTE — END OF VISIT
[FreeTextEntry3] : \par I saw and evaluated Ms. LEWIS with Dr. Christianson and I agree with the documentation above.   I modified the note above (if indicated) and agree with its contents in the present form.  s/p  delivery 2019 twin gestation.  Had preeclampsia with severe features.  Blood pressure today within normal limits.  Paragard IUD placed at the time of  delivery.  No suicidal or homicidal ideation.  Incision is clean/dry/intact.\par \par The IUD strings could not be see.  Ultrasound revealed that the IUD is rotated 90 degrees.  A backup method of contraception should be used.  The patient asked if she should take the morning afterpill if she had sexual intercourse within the past day or two and I said yes.\par \par Ms. Lewis will go to the GYN clinic for further management. \par Follow up blood pressures with PCP.  \par \par Pablo Connolly MD\par \par \par \par \par \par

## 2019-08-22 NOTE — HISTORY OF PRESENT ILLNESS
[Delivery Date: ___] : on [unfilled] [Repeat C/S] : delivered by  section (repeat) [Multiples: ___] : Delivery History: [unfilled] babies [Wt. ___] : weighing [unfilled] [Breastfeeding] : currently nursing [Clean/Dry/Intact] : clean, dry and intact [Back to Normal] : is back to normal in size [None] : no vaginal bleeding [Normal] : the vagina was normal [Cervix Sample Taken] : cervical sample taken for a Pap smear [Examination Of The Breasts] : breasts are normal [Awake] : awake [Alert] : alert [Soft] : soft [Oriented x3] : oriented to person, place, and time [RRR, No Murmurs] : RRR, no murmurs [CTAB] : CTAB [Acute Distress] : no acute distress [Mass] : no breast mass [Nipple Discharge] : no nipple discharge [Axillary LAD] : no axillary lymphadenopathy [Tender] : non tender [Distended] : not distended [FreeTextEntry8] : 28 y/o P5 s/p  at 35 weeks of di/di twins for preeclampsia with severe features, now 7 weeks postpartum, doing well. Not measuring BPs at home. BP today 124/85. Denies hedache, blurry vision, chest pain, SOB, epigastric pain and leg swelling. In last clinic visit for wound check complained of RLQ  abdominal pain. Was sent for a CT abdomen which she did not do because the pain improved on its own. Did not resume menses yet. Complains of dysuria, no urgency or frequency. Normal bowel movements. Breastfeeding and bottle feeding babies. Good mood.  [FreeTextEntry9] : di/di twin pregnancy, preeclampsia with severe features s/p mag [de-identified] : Recommended IUD removal. Sent to gyn clinic to attempt removal there. If unable to will libby hysteroscopy. Upreg in clinic negative today. Had unprotected intercourse 48 hours ago (sent plan B to pharmacy). Cotesting repeated today (5/2018 PAP NILM and HPV HR positive, non 16/18). Ucx for dysuria.  [de-identified] : 28 y/o P5 s/p repeat  and paragard IUD placement for di/di twin pregnancy at 35 weeks complicated by preeclampsia without severe features, now 7 weeks postpartum, doing well. On physical exam IUD strings not visualized. TVUS demonstrated malpositioned IUD with wings in area of anterior scar (90 degrees rotated to the right).

## 2019-08-22 NOTE — HISTORY OF PRESENT ILLNESS
[Delivery Date: ___] : on [unfilled] [Repeat C/S] : delivered by  section (repeat) [Multiples: ___] : Delivery History: [unfilled] babies [Wt. ___] : weighing [unfilled] [Breastfeeding] : currently nursing [Clean/Dry/Intact] : clean, dry and intact [Back to Normal] : is back to normal in size [None] : no vaginal bleeding [Normal] : the vagina was normal [Cervix Sample Taken] : cervical sample taken for a Pap smear [Examination Of The Breasts] : breasts are normal [Awake] : awake [Alert] : alert [Soft] : soft [Oriented x3] : oriented to person, place, and time [RRR, No Murmurs] : RRR, no murmurs [CTAB] : CTAB [Acute Distress] : no acute distress [Mass] : no breast mass [Nipple Discharge] : no nipple discharge [Axillary LAD] : no axillary lymphadenopathy [Tender] : non tender [Distended] : not distended [FreeTextEntry8] : 28 y/o P5 s/p  at 35 weeks of di/di twins for preeclampsia with severe features, now 7 weeks postpartum, doing well. Not measuring BPs at home. BP today 124/85. Denies hedache, blurry vision, chest pain, SOB, epigastric pain and leg swelling. In last clinic visit for wound check complained of RLQ  abdominal pain. Was sent for a CT abdomen which she did not do because the pain improved on its own. Did not resume menses yet. Complains of dysuria, no urgency or frequency. Normal bowel movements. Breastfeeding and bottle feeding babies. Good mood.  [FreeTextEntry9] : di/di twin pregnancy, preeclampsia with severe features s/p mag [de-identified] : 30 y/o P5 s/p repeat  and paragard IUD placement for di/di twin pregnancy at 35 weeks complicated by preeclampsia without severe features, now 7 weeks postpartum, doing well. On physical exam IUD strings not visualized. TVUS demonstrated malpositioned IUD with wings in area of anterior scar (90 degrees rotated to the right).  [de-identified] : Recommended IUD removal. Sent to gyn clinic to attempt removal there. If unable to will libby hysteroscopy. Upreg in clinic negative today. Had unprotected intercourse 48 hours ago (sent plan B to pharmacy). Cotesting repeated today (5/2018 PAP NILM and HPV HR positive, non 16/18). Ucx for dysuria.

## 2019-08-23 VITALS
BODY MASS INDEX: 42.55 KG/M2 | DIASTOLIC BLOOD PRESSURE: 85 MMHG | HEART RATE: 65 BPM | TEMPERATURE: 98.2 F | WEIGHT: 240.19 LBS | OXYGEN SATURATION: 100 % | SYSTOLIC BLOOD PRESSURE: 124 MMHG

## 2019-08-23 LAB
HCG UR QL: NEGATIVE
QUALITY CONTROL: YES

## 2019-08-23 NOTE — ED ADULT NURSE NOTE - CCCP TRG CHIEF CMPLNT
Spoke with patient via phone.   Last INR 8/13/19 was 6.5.  Dose decreased.   Today's INR is 2.1 and is within goal range.    Current warfarin dosing verified with patient. Patient was informed that their INR result is within therapeutic range and instructed to increase current dose. Discussed dose and return date for next INR.    Lab INR today is 2.1, in range and down from previous INR 6.5 in ten days while on 29mg/wk (held dose on 8/13 and 8/14). Hx of unstable INRs and difficult to get in and keep in range.  Denies alcohol use but states Colestipol was stopped d/t constipation problems, could be the reason for previous elevated and unstable INRs. D/t significant drop in INR, will increase TWD to 30mg/wk and recheck INR in 1-2 week at the [facility] [place] location. Pt prefers only to be checked in [facility].     Joselyn Brown NP is in the office today supervising the treatment.  Routed to supervising provider for review.     Patient was instructed to contact the clinic with any unusual bleeding or bruising, any changes in medications, diet, health status, lifestyle, or any other changes, questions or concerns. Patient verbalized understanding of all discussed.      dizziness

## 2019-09-03 DIAGNOSIS — T83.32XA DISPLACEMENT OF INTRAUTERINE CONTRACEPTIVE DEVICE, INITIAL ENCOUNTER: ICD-10-CM

## 2019-09-03 DIAGNOSIS — Z98.891 HISTORY OF UTERINE SCAR FROM PREVIOUS SURGERY: ICD-10-CM

## 2019-09-03 LAB — HPV HIGH+LOW RISK DNA PNL CVX: NOT DETECTED

## 2020-01-02 NOTE — ED BEHAVIORAL HEALTH ASSESSMENT NOTE - NS ED BHA COCAINE
Medication:    Outpatient Medications Marked as Taking for the 1/2/20 encounter (Refill) with Joanie Palacio MD   Medication Sig Dispense Refill   • buPROPion (WELLBUTRIN SR) 200 MG 12 hr tablet Take 1 tablet by mouth 2 times daily. 180 tablet 0       Message to Prescriber: mail order    [x] Pharmacy has been verified.    [] Patient completely out of medication (*Route encounter as high priority if checked)    [] Patient informed refill request can take up to 3 business days to be processed    Patient currently has follow up appointment scheduled       None known

## 2020-02-11 NOTE — ED PROVIDER NOTE - PSYCHIATRIC, MLM
Pt states she is in the process of moving out of [place] and she can not find her inhaler.  She would like the script sent to the pharmacy in [place] as it is on the way to their destination.   Alert and oriented to person, place, time/situation. normal mood and affect. no apparent risk to self or others.

## 2020-02-16 NOTE — BRIEF OPERATIVE NOTE - ANESTHESIOLOGIST NAME
Dr. Davis Stable  - Continue metoprolol 25mg qd and norvasc 5mg qd with hold parameters Stable  - Continue metoprolol 25mg qd and norvasc 5mg qd with hold parameters  - monitor hemodynamics

## 2020-07-06 ENCOUNTER — EMERGENCY (EMERGENCY)
Facility: HOSPITAL | Age: 30
LOS: 0 days | Discharge: HOME | End: 2020-07-06
Attending: EMERGENCY MEDICINE | Admitting: EMERGENCY MEDICINE
Payer: MEDICAID

## 2020-07-06 VITALS
OXYGEN SATURATION: 100 % | TEMPERATURE: 98 F | SYSTOLIC BLOOD PRESSURE: 133 MMHG | DIASTOLIC BLOOD PRESSURE: 85 MMHG | HEART RATE: 74 BPM | RESPIRATION RATE: 19 BRPM

## 2020-07-06 DIAGNOSIS — Z88.8 ALLERGY STATUS TO OTHER DRUGS, MEDICAMENTS AND BIOLOGICAL SUBSTANCES: ICD-10-CM

## 2020-07-06 DIAGNOSIS — Z98.890 OTHER SPECIFIED POSTPROCEDURAL STATES: Chronic | ICD-10-CM

## 2020-07-06 DIAGNOSIS — I10 ESSENTIAL (PRIMARY) HYPERTENSION: ICD-10-CM

## 2020-07-06 DIAGNOSIS — N93.9 ABNORMAL UTERINE AND VAGINAL BLEEDING, UNSPECIFIED: ICD-10-CM

## 2020-07-06 DIAGNOSIS — Z98.891 HISTORY OF UTERINE SCAR FROM PREVIOUS SURGERY: ICD-10-CM

## 2020-07-06 DIAGNOSIS — N93.8 OTHER SPECIFIED ABNORMAL UTERINE AND VAGINAL BLEEDING: ICD-10-CM

## 2020-07-06 DIAGNOSIS — N83.201 UNSPECIFIED OVARIAN CYST, RIGHT SIDE: ICD-10-CM

## 2020-07-06 DIAGNOSIS — F17.200 NICOTINE DEPENDENCE, UNSPECIFIED, UNCOMPLICATED: ICD-10-CM

## 2020-07-06 LAB
APPEARANCE UR: ABNORMAL
BACTERIA # UR AUTO: ABNORMAL
BASOPHILS # BLD AUTO: 0.04 K/UL — SIGNIFICANT CHANGE UP (ref 0–0.2)
BASOPHILS NFR BLD AUTO: 0.5 % — SIGNIFICANT CHANGE UP (ref 0–1)
BILIRUB UR-MCNC: NEGATIVE — SIGNIFICANT CHANGE UP
COLOR SPEC: YELLOW — SIGNIFICANT CHANGE UP
DIFF PNL FLD: ABNORMAL
EOSINOPHIL # BLD AUTO: 0.13 K/UL — SIGNIFICANT CHANGE UP (ref 0–0.7)
EOSINOPHIL NFR BLD AUTO: 1.6 % — SIGNIFICANT CHANGE UP (ref 0–8)
EPI CELLS # UR: 23 /HPF — HIGH (ref 0–5)
GLUCOSE UR QL: NEGATIVE — SIGNIFICANT CHANGE UP
HCT VFR BLD CALC: 37.8 % — SIGNIFICANT CHANGE UP (ref 37–47)
HGB BLD-MCNC: 11.6 G/DL — LOW (ref 12–16)
HYALINE CASTS # UR AUTO: 8 /LPF — HIGH (ref 0–7)
IMM GRANULOCYTES NFR BLD AUTO: 0.4 % — HIGH (ref 0.1–0.3)
KETONES UR-MCNC: SIGNIFICANT CHANGE UP
LEUKOCYTE ESTERASE UR-ACNC: NEGATIVE — SIGNIFICANT CHANGE UP
LYMPHOCYTES # BLD AUTO: 2.07 K/UL — SIGNIFICANT CHANGE UP (ref 1.2–3.4)
LYMPHOCYTES # BLD AUTO: 25.7 % — SIGNIFICANT CHANGE UP (ref 20.5–51.1)
MCHC RBC-ENTMCNC: 26 PG — LOW (ref 27–31)
MCHC RBC-ENTMCNC: 30.7 G/DL — LOW (ref 32–37)
MCV RBC AUTO: 84.6 FL — SIGNIFICANT CHANGE UP (ref 81–99)
MONOCYTES # BLD AUTO: 0.45 K/UL — SIGNIFICANT CHANGE UP (ref 0.1–0.6)
MONOCYTES NFR BLD AUTO: 5.6 % — SIGNIFICANT CHANGE UP (ref 1.7–9.3)
NEUTROPHILS # BLD AUTO: 5.35 K/UL — SIGNIFICANT CHANGE UP (ref 1.4–6.5)
NEUTROPHILS NFR BLD AUTO: 66.2 % — SIGNIFICANT CHANGE UP (ref 42.2–75.2)
NITRITE UR-MCNC: NEGATIVE — SIGNIFICANT CHANGE UP
NRBC # BLD: 0 /100 WBCS — SIGNIFICANT CHANGE UP (ref 0–0)
PH UR: 6 — SIGNIFICANT CHANGE UP (ref 5–8)
PLATELET # BLD AUTO: 346 K/UL — SIGNIFICANT CHANGE UP (ref 130–400)
PROT UR-MCNC: ABNORMAL
RBC # BLD: 4.47 M/UL — SIGNIFICANT CHANGE UP (ref 4.2–5.4)
RBC # FLD: 15.2 % — HIGH (ref 11.5–14.5)
RBC CASTS # UR COMP ASSIST: 16 /HPF — HIGH (ref 0–4)
SP GR SPEC: 1.03 — HIGH (ref 1.01–1.02)
UROBILINOGEN FLD QL: ABNORMAL
WBC # BLD: 8.07 K/UL — SIGNIFICANT CHANGE UP (ref 4.8–10.8)
WBC # FLD AUTO: 8.07 K/UL — SIGNIFICANT CHANGE UP (ref 4.8–10.8)
WBC UR QL: 13 /HPF — HIGH (ref 0–5)

## 2020-07-06 PROCEDURE — 76856 US EXAM PELVIC COMPLETE: CPT | Mod: 26

## 2020-07-06 PROCEDURE — 99285 EMERGENCY DEPT VISIT HI MDM: CPT

## 2020-07-06 NOTE — ED PROVIDER NOTE - PHYSICAL EXAMINATION
Physical Exam    Vital Signs: I have reviewed the initial vital signs.  Constitutional: well-nourished, appears stated age, no acute distress  Cardiovascular: S1 and S2, regular rate, regular rhythm, well-perfused extremities, radial pulses equal and 2+ b/l.   Respiratory: unlabored respiratory effort, clear to auscultation bilaterally no wheezing, rales and rhonchi. pt is speaking full sentences. no accessory muscle use.   Gastrointestinal: soft, non-tender, nondistended abdomen, no pulsatile mass, normal bowl sounds, no rebound, no guarding  Genitourinary: exam chaperoned by abraham crouch. no genital rashes. minimal blood pooling in the vaginal vault. cervical os is closed. no cmt. iud felt during bimanual exam.   Integumentary: warm, dry, no rash

## 2020-07-06 NOTE — ED ADULT NURSE NOTE - CHPI ED NUR SYMPTOMS NEG
no coffee grounds emesis/no nausea/no vaginal discharge/no discharge/no vomiting/no abdominal pain/no back pain/no pain/no fever

## 2020-07-06 NOTE — ED ADULT NURSE NOTE - OBJECTIVE STATEMENT
pt c/o vaginal bleeding x3 weeks, denies abdominal pain/cramping. Pt states she has IUD, is concerned it may be displaced.

## 2020-07-06 NOTE — ED PROVIDER NOTE - NS ED ROS FT
CONST: No fever, chills or bodyaches  EYES: No pain, redness, drainage or visual changes.  ENT: No ear pain or discharge, nasal discharge or congestion. No sore throat  CARD: No chest pain, palpitations  RESP: No SOB, cough, hemoptysis. No hx of asthma or COPD  GI: No abdominal pain, N/V/D  : (+) vaginal bleeding. No urinary symptoms  MS: No joint pain, back pain or extremity pain/injury  SKIN: No rashes  NEURO: No headache, dizziness, paresthesias or LOC

## 2020-07-06 NOTE — ED PROVIDER NOTE - CLINICAL SUMMARY MEDICAL DECISION MAKING FREE TEXT BOX
29 yo female with DUB with vaginal bleeding x 3 weeks. States she had LMP on June 1. Pt denies trauma. Had UA, CBC, US pelvis, Upreg--results reviewed. +3.9 cm right ovarian cyst, no pelvic or abdomen pain however. UA no acute infection, Upreg neg, CBC normal. Pt has  paraguard IUD. Will refer to [facility] for follow up and reevaluation to discuss her DUB. No other acute intervention needed in ER.

## 2020-07-06 NOTE — ED ADULT TRIAGE NOTE - CHIEF COMPLAINT QUOTE
Patient presents with vaginal bleeding x 3 weeks. States she has an IUD in place but does not believe it is in the right place

## 2020-07-06 NOTE — ED PROVIDER NOTE - OBJECTIVE STATEMENT
29 y/o female  with a PMH of ovarian cyst, HTN, and preeclampsia during pregnancy presents to the ED for evaluation of vaginal bleeding x 3 weeks. pt admits she had the paraguard IUD placed a year ago after her last pregnancy. pt admits her LMP was around the first week of . pt admits her periods are irregular, and only last about 7 days. Pt has an appt with her gyn on 2020. pt admits two months ago she had two tampons stuck in her vagina which were removed by the father of her children. pt is unsure if he moved her iud during the process. pt is sexually active with multiple partners requesting std testing, denying ppx treatment. pt denies abdominal pain, n/v/d/c, urinary symptoms, back pain, or recent trauma.

## 2020-07-06 NOTE — ED PROVIDER NOTE - PROGRESS NOTE DETAILS
discussed radiology and labs results with pt. pt denies ppx medication for stds. advised to f/u with gyn. advised of return precaution such as increase vaginal bleeding, abdominal pain, n/v/d, fever, or urinary symptoms. agreeable to dc. I have personally evaluated the patient myself and agree with fellow's plan.

## 2020-07-06 NOTE — ED PROVIDER NOTE - CARE PLAN
Principal Discharge DX:	Vaginal bleeding  Secondary Diagnosis:	Ovarian cyst Principal Discharge DX:	Vaginal bleeding  Secondary Diagnosis:	Ovarian cyst  Secondary Diagnosis:	DUB (dysfunctional uterine bleeding)

## 2020-07-06 NOTE — ED PROVIDER NOTE - NSFOLLOWUPINSTRUCTIONS_ED_ALL_ED_FT
Ovarian Cyst    An ovarian cyst is a fluid-filled sac on an ovary. The ovaries are organs that make eggs in women. Most ovarian cysts go away on their own and are not cancerous (are benign). Some cysts need treatment.      Follow these instructions at home:    Take over-the-counter and prescription medicines only as told by your doctor.  Do not drive or use heavy machinery while taking prescription pain medicine.  Get pelvic exams and Pap tests as often as told by your doctor.  Return to your normal activities as told by your doctor. Ask your doctor what activities are safe for you.  Do not use any products that contain nicotine or tobacco, such as cigarettes and e-cigarettes. If you need help quitting, ask your doctor.  Keep all follow-up visits as told by your doctor. This is important.      Contact a doctor if:    Your periods are:  Late.  Irregular.  Painful.  Your periods stop.  You have pelvic pain that does not go away.  You have pressure on your bladder.  You have trouble making your bladder empty when you pee (urinate).  You have pain during sex.  You have any of the following in your belly (abdomen):  A feeling of fullness.  Pressure.  Discomfort.  Pain that does not go away.  Swelling.  You feel sick most of the time.  You have trouble pooping (have constipation).  You are not as hungry as usual (you lose your appetite).  You get very bad acne.  You start to have more hair on your body and face.  You are gaining weight or losing weight without changing your exercise and eating habits.  You think you may be pregnant.      Get help right away if:    You have belly pain that is very bad or gets worse.  You cannot eat or drink without throwing up (vomiting).  You suddenly get a fever.  Your period is a lot heavier than usual.  This information is not intended to replace advice given to you by your health care provider. Make sure you discuss any questions you have with your health care provider.    Abnormal Uterine Bleeding    Abnormal uterine bleeding can affect women at various stages in life, including teenagers, women in their reproductive years, pregnant women, and women who have reached menopause. Several kinds of uterine bleeding are considered abnormal, including:     Bleeding or spotting between periods.    Bleeding after sexual intercourse.    Bleeding that is heavier or more than normal.    Periods that last longer than usual.  Bleeding after menopause.      Many cases of abnormal uterine bleeding are minor and simple to treat, while others are more serious. Any type of abnormal bleeding should be evaluated by your health care provider. Treatment will depend on the cause of the bleeding.    HOME CARE INSTRUCTIONS  Monitor your condition for any changes. The following actions may help to alleviate any discomfort you are experiencing:    Avoid the use of tampons and douches as directed by your health care provider.   Change your pads frequently.     You should get regular pelvic exams and Pap tests. Keep all follow-up appointments for diagnostic tests as directed by your health care provider.     SEEK MEDICAL CARE IF:  Your bleeding lasts more than 1 week.    You feel dizzy at times.      SEEK IMMEDIATE MEDICAL CARE IF:  You pass out.    You are changing pads every 15 to 30 minutes.    You have abdominal pain.   You have a fever.    You become sweaty or weak.    You are passing large blood clots from the vagina.    You start to feel nauseous and vomit.     MAKE SURE YOU:  Understand these instructions.  Will watch your condition.  Will get help right away if you are not doing well or get worse.    ADDITIONAL NOTES AND INSTRUCTIONS    Please follow up with your Primary MD in 24-48 hr.  Seek immediate medical care for any new/worsening signs or symptoms.

## 2020-07-06 NOTE — ED PROVIDER NOTE - NSFOLLOWUPCLINICS_GEN_ALL_ED_FT
[facility]  OB/GYN  [address] [place] [address]  Phone: [phone]  Fax:   Follow Up Time: 1-3 Days

## 2020-07-06 NOTE — ED PROVIDER NOTE - ATTENDING CONTRIBUTION TO CARE
31 yo female of HTN, preeclampsia, IUD presents to the ER for 3 weeks of vaginal bleeding. States she had her normal period on June 1, lasted 7 days, generally feels she has heavy flows. Period stopped and then started up again around Mariana 15 and at this point she saw it seemed like it was stopping (dark brown blood, light flow) but today wiped and noticed bright red blood. Pt states she is concerned about her IUD as a couple months her baby's father had to assist her in removing a tampon stuck in her vagina, and he told her he doesn't know if he moved the IUD. Pt denies any pelvic pain/abdomen pain/N/V/D/dysuria/trauma/back pain. Pt's upreg neg in ER. GYN exam per PA note. Will check cbc, UA, pelvic US, upreg is negative in ER. To reassess.     ALL: Nyquil-->tongue swelling  Meds denies  PMH as above  SH: +smoker  LMP: June 1  PMD Denies  OB/GYN [facility]

## 2020-07-06 NOTE — ED PROVIDER NOTE - PATIENT PORTAL LINK FT
You can access the FollowMyHealth Patient Portal offered by [facility] by registering at the following website: [email or web address]. By joining [facility]’s FollowMyHealth portal, you will also be able to view your health information using other applications (apps) compatible with our system.

## 2020-07-07 LAB
C TRACH RRNA SPEC QL NAA+PROBE: SIGNIFICANT CHANGE UP
N GONORRHOEA RRNA SPEC QL NAA+PROBE: SIGNIFICANT CHANGE UP
SPECIMEN SOURCE: SIGNIFICANT CHANGE UP

## 2020-07-21 ENCOUNTER — APPOINTMENT (OUTPATIENT)
Dept: OBGYN | Facility: CLINIC | Age: 30
End: 2020-07-21
Payer: MEDICAID

## 2020-07-21 ENCOUNTER — RESULT CHARGE (OUTPATIENT)
Age: 30
End: 2020-07-21

## 2020-07-21 ENCOUNTER — OUTPATIENT (OUTPATIENT)
Dept: OUTPATIENT SERVICES | Facility: HOSPITAL | Age: 30
LOS: 1 days | Discharge: HOME | End: 2020-07-21

## 2020-07-21 VITALS
WEIGHT: 246.06 LBS | SYSTOLIC BLOOD PRESSURE: 126 MMHG | HEIGHT: 63 IN | BODY MASS INDEX: 43.6 KG/M2 | DIASTOLIC BLOOD PRESSURE: 82 MMHG

## 2020-07-21 DIAGNOSIS — N92.6 IRREGULAR MENSTRUATION, UNSPECIFIED: ICD-10-CM

## 2020-07-21 DIAGNOSIS — Z98.891 HISTORY OF UTERINE SCAR FROM PREVIOUS SURGERY: ICD-10-CM

## 2020-07-21 DIAGNOSIS — Z98.890 OTHER SPECIFIED POSTPROCEDURAL STATES: Chronic | ICD-10-CM

## 2020-07-21 DIAGNOSIS — B37.3 CANDIDIASIS OF VULVA AND VAGINA: ICD-10-CM

## 2020-07-21 DIAGNOSIS — O09.93 SUPERVISION OF HIGH RISK PREGNANCY, UNSPECIFIED, THIRD TRIMESTER: ICD-10-CM

## 2020-07-21 DIAGNOSIS — O30.049 TWIN PREGNANCY, DICHORIONIC/DIAMNIOTIC, UNSPECIFIED TRIMESTER: ICD-10-CM

## 2020-07-21 LAB
HCG UR QL: NEGATIVE
QUALITY CONTROL: YES

## 2020-07-21 PROCEDURE — 58300 INSERT INTRAUTERINE DEVICE: CPT | Mod: GC

## 2020-07-21 PROCEDURE — 58301 REMOVE INTRAUTERINE DEVICE: CPT | Mod: GC

## 2020-07-21 NOTE — END OF VISIT
[FreeTextEntry3] : I personally discussed this patient with Dr Mendoza and was present for patient counseling and procedure. I agree with the assessment and plan as written, it was reviewed and edited by me where appropriate.\par

## 2020-07-21 NOTE — PROCEDURE
[IUD Placement] : intrauterine device (IUD) placement [Prevention of Pregnancy] : prevention of pregnancy [Infection] : infection [Pain] : pain [Bleeding] : bleeding [Expulsion] : expulsion [Failure] : failure [Uterine Perforation] : uterine perforation [CONSENT OBTAINED] : written consent was obtained prior to the procedure. [LMP ___] : LMP was [unfilled] [Neg Pregnancy Test] : a pregnancy test was negative [Betadine] : Prepped with Betadine [Uterus Sounded to ___cm] : sounded to [unfilled]Ucm [Tenaculum] : a single toothed tenaculum [Easy Passage] : allowed easy passage of a uterine sound without dilation [ParaGard IUD] : The ParaGard IUD was inserted to the fundus of the uterus.  The IUD strings were cut to an appropriate length. [Patient] : patient [Paraguard] : Venessa [Risks] : risks [Alternatives] : alternatives [Benefits] : benefits [Consent Obtained] : consent was obtained prior to the procedure and is detailed in the patient's record [Strings Visualized] : the IUD strings were visualized [Speculum Placed] : a speculum was placed in the vagina [Tolerated Well] : the patient tolerated the procedure well [IUD Discarded] : discarded [IUD Removed - Forceps] : the strings were grasped with forceps and the IUD was removed [No Complications] : none [de-identified] : displaced IUD

## 2020-07-22 DIAGNOSIS — T83.84XA PAIN DUE TO GENITOURINARY PROSTHETIC DEVICES, IMPLANTS AND GRAFTS, INITIAL ENCOUNTER: ICD-10-CM

## 2020-07-22 DIAGNOSIS — Z30.433 ENCOUNTER FOR REMOVAL AND REINSERTION OF INTRAUTERINE CONTRACEPTIVE DEVICE: ICD-10-CM

## 2020-07-22 DIAGNOSIS — T83.32XA DISPLACEMENT OF INTRAUTERINE CONTRACEPTIVE DEVICE, INITIAL ENCOUNTER: ICD-10-CM

## 2020-07-31 LAB
A VAGINAE DNA VAG QL NAA+PROBE: ABNORMAL
BVAB2 DNA VAG QL NAA+PROBE: ABNORMAL
C KRUSEI DNA VAG QL NAA+PROBE: NEGATIVE
C TRACH RRNA SPEC QL NAA+PROBE: NEGATIVE
MEGA1 DNA VAG QL NAA+PROBE: ABNORMAL
N GONORRHOEA RRNA SPEC QL NAA+PROBE: NEGATIVE
T VAGINALIS RRNA SPEC QL NAA+PROBE: NEGATIVE

## 2020-07-31 RX ORDER — METRONIDAZOLE 500 MG/1
500 TABLET ORAL TWICE DAILY
Qty: 14 | Refills: 1 | Status: ACTIVE | COMMUNITY
Start: 2020-07-31 | End: 1900-01-01

## 2020-09-17 ENCOUNTER — APPOINTMENT (OUTPATIENT)
Dept: OBGYN | Facility: CLINIC | Age: 30
End: 2020-09-17

## 2020-11-20 NOTE — OB RN TRIAGE NOTE - COMFORT/ACCEPTABLE PAIN LEVEL (0-10)
Child returned to room 2 via cart awake and alert sucking on his thumb. Child nods when asked if he is having any ear pain. Taking apple juice and eating some banana bread. Vss. Mom at bedside.  
2

## 2021-04-13 ENCOUNTER — OUTPATIENT (OUTPATIENT)
Dept: OUTPATIENT SERVICES | Facility: HOSPITAL | Age: 31
LOS: 1 days | Discharge: HOME | End: 2021-04-13

## 2021-04-13 ENCOUNTER — APPOINTMENT (OUTPATIENT)
Dept: OBGYN | Facility: CLINIC | Age: 31
End: 2021-04-13
Payer: MEDICAID

## 2021-04-13 VITALS
SYSTOLIC BLOOD PRESSURE: 120 MMHG | HEIGHT: 63 IN | DIASTOLIC BLOOD PRESSURE: 80 MMHG | BODY MASS INDEX: 42.17 KG/M2 | WEIGHT: 238 LBS

## 2021-04-13 DIAGNOSIS — Z98.890 OTHER SPECIFIED POSTPROCEDURAL STATES: Chronic | ICD-10-CM

## 2021-04-13 PROCEDURE — 99212 OFFICE O/P EST SF 10 MIN: CPT | Mod: 25

## 2021-04-13 PROCEDURE — 99395 PREV VISIT EST AGE 18-39: CPT

## 2021-04-13 NOTE — PHYSICAL EXAM
[Appropriately responsive] : appropriately responsive [Alert] : alert [No Acute Distress] : no acute distress [No Lymphadenopathy] : no lymphadenopathy [Regular Rate Rhythm] : regular rate rhythm [No Murmurs] : no murmurs [Clear to Auscultation B/L] : clear to auscultation bilaterally [Soft] : soft [Non-tender] : non-tender [Non-distended] : non-distended [No HSM] : No HSM [No Lesions] : no lesions [No Mass] : no mass [Oriented x3] : oriented x3 [Examination Of The Breasts] : a normal appearance [No Masses] : no breast masses were palpable [Labia Majora] : normal [Labia Minora] : normal [Cystocele] : a cystocele [IUD String] : an IUD string was noted [Normal] : normal [Uterine Adnexae] : normal

## 2021-04-13 NOTE — END OF VISIT
[] : Resident [FreeTextEntry3] : #1 health maintenance done\par \par #2 discharge\par hx taken\par yeast\par aptima\par meds sent

## 2021-04-13 NOTE — DISCUSSION/SUMMARY
[FreeTextEntry1] : 30 yo P5, w/ Paragard IUD in place, yeast infection, stress incontinence and possible cystocele, for annual exam.\par \par - f/up vaginitis\par - rx terazol sent to pharmacy\par - encourage to follow up with primary care\par - gardasil #1 administered today by nurse\par - next pap 2022\par - referral for urogynecology\par - Paragard IUD until 7/2030\par - RTC 2 months gardasil #2

## 2021-04-13 NOTE — HISTORY OF PRESENT ILLNESS
[FreeTextEntry1] : 32 yo P5 w/ LMP a few months ago here for annual exam. Had Paragard IUD placed 7/2020, happy with this method of contraception. Has irregular menses since Paragard placement. She is complaining of some white discharge for the past week. She is reports feeling that her "bladder is dropped". She has leakage of urine with coughing, laughing  and sneezing. \par \par Last pap:  8/2019 NILM, HPV neg\par Last HM years ago [Y] : Patient uses contraception [IUD] : has an intrauterine device [PapSmeardate] : 2019

## 2021-04-20 ENCOUNTER — NON-APPOINTMENT (OUTPATIENT)
Age: 31
End: 2021-04-20

## 2021-04-20 DIAGNOSIS — Z00.00 ENCOUNTER FOR GENERAL ADULT MEDICAL EXAMINATION W/OUT ABNORMAL FINDINGS: ICD-10-CM

## 2021-04-28 ENCOUNTER — LABORATORY RESULT (OUTPATIENT)
Age: 31
End: 2021-04-28

## 2021-04-28 ENCOUNTER — APPOINTMENT (OUTPATIENT)
Dept: UROGYNECOLOGY | Facility: CLINIC | Age: 31
End: 2021-04-28
Payer: MEDICAID

## 2021-04-28 ENCOUNTER — OUTPATIENT (OUTPATIENT)
Dept: OUTPATIENT SERVICES | Facility: HOSPITAL | Age: 31
LOS: 1 days | Discharge: HOME | End: 2021-04-28

## 2021-04-28 VITALS
DIASTOLIC BLOOD PRESSURE: 72 MMHG | WEIGHT: 234.38 LBS | SYSTOLIC BLOOD PRESSURE: 112 MMHG | BODY MASS INDEX: 43.13 KG/M2 | HEIGHT: 62 IN

## 2021-04-28 DIAGNOSIS — Z87.42 PERSONAL HISTORY OF OTHER DISEASES OF THE FEMALE GENITAL TRACT: ICD-10-CM

## 2021-04-28 DIAGNOSIS — M79.10 MYALGIA, UNSPECIFIED SITE: ICD-10-CM

## 2021-04-28 DIAGNOSIS — R87.810 CERVICAL HIGH RISK HUMAN PAPILLOMAVIRUS (HPV) DNA TEST POSITIVE: ICD-10-CM

## 2021-04-28 DIAGNOSIS — K59.00 CONSTIPATION, UNSPECIFIED: ICD-10-CM

## 2021-04-28 DIAGNOSIS — B96.89 ACUTE VAGINITIS: ICD-10-CM

## 2021-04-28 DIAGNOSIS — N76.0 ACUTE VAGINITIS: ICD-10-CM

## 2021-04-28 DIAGNOSIS — T83.32XA DISPLACEMENT OF INTRAUTERINE CONTRACEPTIVE DEVICE, INITIAL ENCOUNTER: ICD-10-CM

## 2021-04-28 DIAGNOSIS — O30.009 TWIN PREGNANCY, UNSPECIFIED NUMBER OF PLACENTA AND UNSPECIFIED NUMBER OF AMNIOTIC SACS, UNSPECIFIED TRIMESTER: ICD-10-CM

## 2021-04-28 DIAGNOSIS — N39.46 MIXED INCONTINENCE: ICD-10-CM

## 2021-04-28 DIAGNOSIS — N81.10 CYSTOCELE, UNSPECIFIED: ICD-10-CM

## 2021-04-28 DIAGNOSIS — T83.84XA PAIN DUE GENIURINARY PROSTHETIC DEVICES, IMPLANTS AND GRAFTS, INITIAL ENCOUNTER: ICD-10-CM

## 2021-04-28 DIAGNOSIS — B37.9 CANDIDIASIS, UNSPECIFIED: ICD-10-CM

## 2021-04-28 DIAGNOSIS — F17.210 NICOTINE DEPENDENCE, CIGARETTES, UNCOMPLICATED: ICD-10-CM

## 2021-04-28 DIAGNOSIS — Z30.433 ENCOUNTER FOR REMOVAL AND REINSERTION OF INTRAUTERINE CONTRACEPTIVE DEVICE: ICD-10-CM

## 2021-04-28 DIAGNOSIS — Z87.59 PERSONAL HISTORY OF OTHER COMPLICATIONS OF PREGNANCY, CHILDBIRTH AND THE PUERPERIUM: ICD-10-CM

## 2021-04-28 DIAGNOSIS — Z98.890 OTHER SPECIFIED POSTPROCEDURAL STATES: Chronic | ICD-10-CM

## 2021-04-28 DIAGNOSIS — R32 UNSPECIFIED URINARY INCONTINENCE: ICD-10-CM

## 2021-04-28 DIAGNOSIS — Z23 ENCOUNTER FOR IMMUNIZATION: ICD-10-CM

## 2021-04-28 DIAGNOSIS — I10 ESSENTIAL (PRIMARY) HYPERTENSION: ICD-10-CM

## 2021-04-28 PROCEDURE — 99205 OFFICE O/P NEW HI 60 MIN: CPT | Mod: 25

## 2021-04-28 PROCEDURE — 51701 INSERT BLADDER CATHETER: CPT

## 2021-04-28 RX ORDER — ASPIRIN 81 MG/1
81 TABLET, CHEWABLE ORAL DAILY
Qty: 60 | Refills: 2 | Status: DISCONTINUED | COMMUNITY
Start: 2019-03-27 | End: 2021-04-28

## 2021-04-28 RX ORDER — VITAMIN A ACETATE, .BETA.-CAROTENE, ASCORBIC ACID, CHOLECALCIFEROL, .ALPHA.-TOCOPHEROL ACETATE, DL-, THIAMINE MONONITRATE, RIBOFLAVIN, NIACINAMIDE, PYRIDOXINE HYDROCHLORIDE, FOLIC ACID, CYANOCOBALAMIN, CALCIUM CARBONATE, FERROUS FUMARATE, ZINC OXIDE, CUPRIC OXIDE 3080; 920; 120; 400; 22; 1.84; 3; 20; 10; 1; 12; 200; 27; 25; 2 [IU]/1; [IU]/1; MG/1; [IU]/1; MG/1; MG/1; MG/1; MG/1; MG/1; MG/1; UG/1; MG/1; MG/1; MG/1; MG/1
27-1 TABLET, FILM COATED ORAL DAILY
Qty: 30 | Refills: 11 | Status: DISCONTINUED | COMMUNITY
Start: 2018-06-15 | End: 2021-04-28

## 2021-04-28 RX ORDER — PRENATAL VIT NO.130/IRON/FOLIC 27MG-0.8MG
28-0.8 TABLET ORAL DAILY
Qty: 30 | Refills: 5 | Status: DISCONTINUED | COMMUNITY
Start: 2019-07-30 | End: 2021-04-28

## 2021-04-28 RX ORDER — CLOTRIMAZOLE 1 %
1 CREAM WITH APPLICATOR VAGINAL
Qty: 1 | Refills: 0 | Status: DISCONTINUED | COMMUNITY
Start: 2019-05-13 | End: 2021-04-28

## 2021-04-28 RX ORDER — TERCONAZOLE 8 MG/G
0.8 CREAM VAGINAL
Qty: 7 | Refills: 0 | Status: DISCONTINUED | COMMUNITY
Start: 2021-04-13 | End: 2021-04-28

## 2021-04-28 RX ORDER — CLOTRIMAZOLE 10 MG/G
1 CREAM VAGINAL
Qty: 1 | Refills: 0 | Status: DISCONTINUED | COMMUNITY
Start: 2019-06-17 | End: 2021-04-28

## 2021-04-28 RX ORDER — METRONIDAZOLE 500 MG/1
500 TABLET ORAL TWICE DAILY
Qty: 14 | Refills: 0 | Status: DISCONTINUED | COMMUNITY
Start: 2019-05-13 | End: 2021-04-28

## 2021-04-28 RX ORDER — TROSPIUM CHLORIDE 20 MG/1
20 TABLET, FILM COATED ORAL
Qty: 60 | Refills: 1 | Status: ACTIVE | COMMUNITY
Start: 2021-04-28 | End: 1900-01-01

## 2021-04-28 RX ORDER — LEVONORGESTREL 1.5 MG/1
1.5 TABLET ORAL
Qty: 1 | Refills: 0 | Status: DISCONTINUED | COMMUNITY
Start: 2019-08-22 | End: 2021-04-28

## 2021-04-28 RX ORDER — VALACYCLOVIR 500 MG/1
500 TABLET, FILM COATED ORAL TWICE DAILY
Qty: 60 | Refills: 0 | Status: DISCONTINUED | COMMUNITY
Start: 2019-07-01 | End: 2021-04-28

## 2021-04-28 RX ORDER — METRONIDAZOLE 500 MG/1
500 TABLET ORAL TWICE DAILY
Qty: 14 | Refills: 1 | Status: DISCONTINUED | COMMUNITY
Start: 2021-04-20 | End: 2021-04-28

## 2021-04-29 LAB
APPEARANCE: ABNORMAL
BILIRUBIN URINE: NEGATIVE
BLOOD URINE: NEGATIVE
COLOR: ABNORMAL
GLUCOSE QUALITATIVE U: NEGATIVE
KETONES URINE: NEGATIVE
LEUKOCYTE ESTERASE URINE: NEGATIVE
NITRITE URINE: NEGATIVE
PH URINE: 8
PROTEIN URINE: NORMAL
SPECIFIC GRAVITY URINE: 1.03
UROBILINOGEN URINE: ABNORMAL

## 2021-04-29 NOTE — DISCUSSION/SUMMARY
[FreeTextEntry1] : \par Mixed incontinence-\par The pathophysiology of the above condition was discussed with the patient. The management options for stress incontinence were discussed including observation, pessary placement, pelvic floor physical therapy or surgery (midurethral sling). We will follow up on her urine tests. The patient voiced understanding and agrees with observation for now but will consider surgical management in the future.\par \par The patient was given information and education on pelvic floor muscle exercises/rehabilitation, avoidance of dietary bladder irritants, and other strategies to improve bladder control, such as scheduled voiding. She was counseled regarding further management strategies for overactive bladder and urge incontinence including pelvic floor physical therapy, medications or surgical management. The patient voiced understanding and agrees with diet changes, constipation control and medical management. The risks and benefits of trospium was reviewed.\par \par Constipation-\par The patient was counseled about her defecatory dysfunction. We discussed the importance of fiber, hydration and exercise. She was given a handout with specific information about dietary fiber and ways to relieve constipation.\par She was also advised to have an evaluation with a gastroenterologist and a referral was provided.\par \par Myalgia-\par Discussed the pathophysiology of the above condition. Reviewed management options including medications (oral or vaginal suppository), injections, pelvic floor physical therapy, or referral for possible pudendal nerve blocks. The patient agrees to observation.

## 2021-04-29 NOTE — HISTORY OF PRESENT ILLNESS
[FreeTextEntry1] : \par Pt with pelvic floor dysfunction here for urogynecologic evaluation. She describes: \par Referring provider: Dr Gill\par \par Chief PFD: urge incontinence \par \par Pelvic organ prolapse: s/p c/s, +bulge x 1 month, denies splinting \par Stress urinary incontinence: reports symptoms (S>U) \par Overactive bladder syndrome: voids q1h, associated with leakage and urge daily and nightly enuresis x years, worsening, denies glaucoma  \par Voiding dysfunction: denies incomplete bladder emptying, no hesitancy \par Lower urinary tract/vaginal symptoms: no documented UTIs in the past year, denies hematuria, no dysuria or bladder pain currently \par Fecal incontinence: denies \par Defecatory dysfunction: hard balls, occasionally takes miralax and occasional enema, also reports 1 episode of rectal bleeding years ago, has never had GI evaluation\par Sexual dysfunction: denies\par Pelvic pain: denies\par Vaginal dryness: denies bothersome symptoms  \par \par Her pelvic floor symptoms are significantly bothersome and negatively impacting her quality of life. \par \par

## 2021-04-29 NOTE — PHYSICAL EXAM
[Chaperone Present] : A chaperone was present in the examining room during all aspects of the physical examination [FreeTextEntry1] : Void: 125  cc\par PVR: 5 cc\par Urethra was prepped in sterile fashion and then a sterile catheter was used by me to drain the bladder.\par \par  Ap: 0 Bp:0\par  \par Well healed incision: Pfannenstiel\par normal perineal sensation\par normal perineal reflexes\par negative cough stress test\par negative atrophy\par positive urethral hypermobility\par bilateral levator ani spasm present with tenderness\par negative urethral tenderness\par mild bladder tenderness\par mild cervical tenderness\par 2/5 Kegel\par

## 2021-04-29 NOTE — COUNSELING
[FreeTextEntry1] : \par We will notify you of the urine results if they are abnormal\par \par Please increase your water intake to at least 5-6 cups of water per day\par \par For 4-5 days, cut one item from the list out of your diet.\par \par After 4-5 days, it it makes a difference, you have to decide if it is worth keeping it out of your diet to help with the urinary issues.\par \par If it does not make a difference, you should add it back into your diet and remove another item for another 4-5 days.\par \par Bowel recipe every night for constipation control\par \par Referral to gastroenterology\par Dr Rolon\par \par Please start the trospium (bladder medication) twice a day for bladder control\par \par Please call my office if you have any issues with the cost or side effects of the medication.\par \par Schedule 6 week med check with my PAKaterina (KALA)

## 2021-04-30 DIAGNOSIS — M79.10 MYALGIA, UNSPECIFIED SITE: ICD-10-CM

## 2021-04-30 DIAGNOSIS — N39.46 MIXED INCONTINENCE: ICD-10-CM

## 2021-04-30 DIAGNOSIS — K59.00 CONSTIPATION, UNSPECIFIED: ICD-10-CM

## 2021-04-30 LAB — BACTERIA UR CULT: NORMAL

## 2021-05-06 NOTE — ED ADULT TRIAGE NOTE - BP NONINVASIVE SYSTOLIC (MM HG)
Detail Level: Simple 133 Patient Specific Counseling (Will Not Stick From Patient To Patient): All lesions treated with LN2 at n/c to the patient Detail Level: Generalized Detail Level: Zone

## 2021-06-25 ENCOUNTER — APPOINTMENT (OUTPATIENT)
Dept: UROGYNECOLOGY | Facility: CLINIC | Age: 31
End: 2021-06-25

## 2021-07-27 ENCOUNTER — APPOINTMENT (OUTPATIENT)
Dept: OBGYN | Facility: CLINIC | Age: 31
End: 2021-07-27

## 2021-08-12 NOTE — ED PROVIDER NOTE - NEURO NEGATIVE STATEMENT, MLM
"Chief Complaint   Patient presents with     Procedure     skin lesion       Initial /76 (BP Location: Right arm, Patient Position: Sitting, Cuff Size: Adult Regular)   Pulse 92   Temp 97.4  F (36.3  C) (Tympanic)   Resp 16   Wt 68.6 kg (151 lb 3.2 oz)   LMP 04/21/2021   Breastfeeding No   BMI 28.11 kg/m   Estimated body mass index is 28.11 kg/m  as calculated from the following:    Height as of 8/5/21: 1.562 m (5' 1.5\").    Weight as of this encounter: 68.6 kg (151 lb 3.2 oz).  Medication Reconciliation: Completed     Prior to the start of the procedure and with procedural staff participation, I verbally confirmed the patient s identity using two indicators, relevant allergies, that the procedure was appropriate and matched the consent or emergent situation, and that the correct equipment/implants were available. Immediately prior to starting the procedure I conducted the Time Out with the procedural staff and re-confirmed the patient s name, procedure, and site/side. (The Joint Commission universal protocol was followed.)  Yes    Sedation (Moderate or Deep): None      Mandi Jeong LPN  " no loss of consciousness, no gait abnormality, no headache, no sensory deficits, and no weakness.

## 2021-10-28 ENCOUNTER — APPOINTMENT (OUTPATIENT)
Dept: OBGYN | Facility: CLINIC | Age: 31
End: 2021-10-28

## 2021-11-10 ENCOUNTER — RX RENEWAL (OUTPATIENT)
Age: 31
End: 2021-11-10

## 2022-05-13 ENCOUNTER — EMERGENCY (EMERGENCY)
Facility: HOSPITAL | Age: 32
LOS: 0 days | Discharge: HOME | End: 2022-05-13
Attending: EMERGENCY MEDICINE | Admitting: EMERGENCY MEDICINE
Payer: MEDICAID

## 2022-05-13 VITALS
HEIGHT: 62 IN | RESPIRATION RATE: 18 BRPM | TEMPERATURE: 99 F | SYSTOLIC BLOOD PRESSURE: 136 MMHG | HEART RATE: 85 BPM | DIASTOLIC BLOOD PRESSURE: 82 MMHG | OXYGEN SATURATION: 100 %

## 2022-05-13 VITALS
OXYGEN SATURATION: 100 % | HEART RATE: 62 BPM | RESPIRATION RATE: 18 BRPM | SYSTOLIC BLOOD PRESSURE: 109 MMHG | DIASTOLIC BLOOD PRESSURE: 71 MMHG | TEMPERATURE: 98 F

## 2022-05-13 DIAGNOSIS — O26.811 PREGNANCY RELATED EXHAUSTION AND FATIGUE, FIRST TRIMESTER: ICD-10-CM

## 2022-05-13 DIAGNOSIS — Z98.890 OTHER SPECIFIED POSTPROCEDURAL STATES: Chronic | ICD-10-CM

## 2022-05-13 DIAGNOSIS — O99.891 OTHER SPECIFIED DISEASES AND CONDITIONS COMPLICATING PREGNANCY: ICD-10-CM

## 2022-05-13 DIAGNOSIS — O34.81 MATERNAL CARE FOR OTHER ABNORMALITIES OF PELVIC ORGANS, FIRST TRIMESTER: ICD-10-CM

## 2022-05-13 DIAGNOSIS — O26.31 RETAINED INTRAUTERINE CONTRACEPTIVE DEVICE IN PREGNANCY, FIRST TRIMESTER: ICD-10-CM

## 2022-05-13 DIAGNOSIS — R11.0 NAUSEA: ICD-10-CM

## 2022-05-13 DIAGNOSIS — N83.201 UNSPECIFIED OVARIAN CYST, RIGHT SIDE: ICD-10-CM

## 2022-05-13 DIAGNOSIS — O26.851 SPOTTING COMPLICATING PREGNANCY, FIRST TRIMESTER: ICD-10-CM

## 2022-05-13 DIAGNOSIS — O00.90 UNSPECIFIED ECTOPIC PREGNANCY WITHOUT INTRAUTERINE PREGNANCY: ICD-10-CM

## 2022-05-13 DIAGNOSIS — Z88.8 ALLERGY STATUS TO OTHER DRUGS, MEDICAMENTS AND BIOLOGICAL SUBSTANCES: ICD-10-CM

## 2022-05-13 LAB
ALBUMIN SERPL ELPH-MCNC: 4.4 G/DL — SIGNIFICANT CHANGE UP (ref 3.5–5.2)
ALP SERPL-CCNC: 96 U/L — SIGNIFICANT CHANGE UP (ref 30–115)
ALT FLD-CCNC: 11 U/L — SIGNIFICANT CHANGE UP (ref 0–41)
ANION GAP SERPL CALC-SCNC: 11 MMOL/L — SIGNIFICANT CHANGE UP (ref 7–14)
APPEARANCE UR: CLEAR — SIGNIFICANT CHANGE UP
AST SERPL-CCNC: 18 U/L — SIGNIFICANT CHANGE UP (ref 0–41)
BASOPHILS # BLD AUTO: 0.03 K/UL — SIGNIFICANT CHANGE UP (ref 0–0.2)
BASOPHILS NFR BLD AUTO: 0.5 % — SIGNIFICANT CHANGE UP (ref 0–1)
BILIRUB SERPL-MCNC: 0.5 MG/DL — SIGNIFICANT CHANGE UP (ref 0.2–1.2)
BILIRUB UR-MCNC: NEGATIVE — SIGNIFICANT CHANGE UP
BUN SERPL-MCNC: 8 MG/DL — LOW (ref 10–20)
CALCIUM SERPL-MCNC: 9 MG/DL — SIGNIFICANT CHANGE UP (ref 8.5–10.1)
CHLORIDE SERPL-SCNC: 106 MMOL/L — SIGNIFICANT CHANGE UP (ref 98–110)
CO2 SERPL-SCNC: 19 MMOL/L — SIGNIFICANT CHANGE UP (ref 17–32)
COLOR SPEC: YELLOW — SIGNIFICANT CHANGE UP
CREAT SERPL-MCNC: 0.6 MG/DL — LOW (ref 0.7–1.5)
DIFF PNL FLD: NEGATIVE — SIGNIFICANT CHANGE UP
EGFR: 121 ML/MIN/1.73M2 — SIGNIFICANT CHANGE UP
EOSINOPHIL # BLD AUTO: 0.12 K/UL — SIGNIFICANT CHANGE UP (ref 0–0.7)
EOSINOPHIL NFR BLD AUTO: 2 % — SIGNIFICANT CHANGE UP (ref 0–8)
GLUCOSE SERPL-MCNC: 83 MG/DL — SIGNIFICANT CHANGE UP (ref 70–99)
GLUCOSE UR QL: NEGATIVE — SIGNIFICANT CHANGE UP
HCG SERPL-ACNC: 680.3 MIU/ML — HIGH
HCT VFR BLD CALC: 34.5 % — LOW (ref 37–47)
HGB BLD-MCNC: 10.9 G/DL — LOW (ref 12–16)
IMM GRANULOCYTES NFR BLD AUTO: 0.3 % — SIGNIFICANT CHANGE UP (ref 0.1–0.3)
KETONES UR-MCNC: NEGATIVE — SIGNIFICANT CHANGE UP
LEUKOCYTE ESTERASE UR-ACNC: NEGATIVE — SIGNIFICANT CHANGE UP
LYMPHOCYTES # BLD AUTO: 1.86 K/UL — SIGNIFICANT CHANGE UP (ref 1.2–3.4)
LYMPHOCYTES # BLD AUTO: 30.8 % — SIGNIFICANT CHANGE UP (ref 20.5–51.1)
MCHC RBC-ENTMCNC: 25.8 PG — LOW (ref 27–31)
MCHC RBC-ENTMCNC: 31.6 G/DL — LOW (ref 32–37)
MCV RBC AUTO: 81.6 FL — SIGNIFICANT CHANGE UP (ref 81–99)
MONOCYTES # BLD AUTO: 0.43 K/UL — SIGNIFICANT CHANGE UP (ref 0.1–0.6)
MONOCYTES NFR BLD AUTO: 7.1 % — SIGNIFICANT CHANGE UP (ref 1.7–9.3)
NEUTROPHILS # BLD AUTO: 3.57 K/UL — SIGNIFICANT CHANGE UP (ref 1.4–6.5)
NEUTROPHILS NFR BLD AUTO: 59.3 % — SIGNIFICANT CHANGE UP (ref 42.2–75.2)
NITRITE UR-MCNC: NEGATIVE — SIGNIFICANT CHANGE UP
NRBC # BLD: 0 /100 WBCS — SIGNIFICANT CHANGE UP (ref 0–0)
PH UR: 6.5 — SIGNIFICANT CHANGE UP (ref 5–8)
PLATELET # BLD AUTO: 288 K/UL — SIGNIFICANT CHANGE UP (ref 130–400)
POTASSIUM SERPL-MCNC: 4.2 MMOL/L — SIGNIFICANT CHANGE UP (ref 3.5–5)
POTASSIUM SERPL-SCNC: 4.2 MMOL/L — SIGNIFICANT CHANGE UP (ref 3.5–5)
PROT SERPL-MCNC: 6.8 G/DL — SIGNIFICANT CHANGE UP (ref 6–8)
PROT UR-MCNC: SIGNIFICANT CHANGE UP
RBC # BLD: 4.23 M/UL — SIGNIFICANT CHANGE UP (ref 4.2–5.4)
RBC # FLD: 15.6 % — HIGH (ref 11.5–14.5)
SODIUM SERPL-SCNC: 136 MMOL/L — SIGNIFICANT CHANGE UP (ref 135–146)
SP GR SPEC: 1.02 — SIGNIFICANT CHANGE UP (ref 1.01–1.03)
UROBILINOGEN FLD QL: SIGNIFICANT CHANGE UP
WBC # BLD: 6.03 K/UL — SIGNIFICANT CHANGE UP (ref 4.8–10.8)
WBC # FLD AUTO: 6.03 K/UL — SIGNIFICANT CHANGE UP (ref 4.8–10.8)

## 2022-05-13 PROCEDURE — 99214 OFFICE O/P EST MOD 30 MIN: CPT

## 2022-05-13 PROCEDURE — 99285 EMERGENCY DEPT VISIT HI MDM: CPT

## 2022-05-13 PROCEDURE — 76830 TRANSVAGINAL US NON-OB: CPT | Mod: 26

## 2022-05-13 PROCEDURE — 76817 TRANSVAGINAL US OBSTETRIC: CPT | Mod: 26

## 2022-05-13 NOTE — ED PROVIDER NOTE - NSFOLLOWUPINSTRUCTIONS_ED_ALL_ED_FT
Pregnancy    WHAT YOU NEED TO KNOW:    A normal pregnancy lasts about 40 weeks. The first trimester lasts from your last period through the 12th week of pregnancy. The second trimester lasts from the 13th week of your pregnancy through the 23rd week. The third trimester lasts from your 24th week of pregnancy until your baby is born. If you know the date of your last period, your healthcare provider can estimate your due date. You may give birth to your baby any time from 37 weeks to 2 weeks after your due date.    DISCHARGE INSTRUCTIONS:    Return to the emergency department if:   You develop a severe headache that does not go away.  You have new or increased vision changes, such as blurred or spotted vision.  You have new or increased swelling in your face or hands.  You have pain or cramping in your abdomen or low back.  You have vaginal bleeding.    Contact your healthcare provider or obstetrician if:   You have abdominal cramps, pressure, or tightening.  You have a change in vaginal discharge.  You cannot keep food or drinks down, and you are losing weight.  You have chills or a fever.  You have vaginal itching, burning, or pain.   You have yellow, green, white, or foul-smelling vaginal discharge.  You have pain or burning when you urinate, less urine than usual, or pink or bloody urine.  You have questions or concerns about your condition or care.    Medicines:     Prenatal vitamins provide some of the extra vitamins and minerals you need during pregnancy. Prenatal vitamins may also help to decrease the risk of certain birth defects.     Take your medicine as directed. Contact your healthcare provider if you think your medicine is not helping or if you have side effects. Tell him or her if you are allergic to any medicine. Keep a list of the medicines, vitamins, and herbs you take. Include the amounts, and when and why you take them. Bring the list or the pill bottles to follow-up visits. Carry your medicine list with you in case of an emergency.    Follow up with your healthcare provider or obstetrician as directed: Go to all of your prenatal visits during your pregnancy. Write down your questions so you remember to ask them during your visits.    Body changes that may occur during your pregnancy:     Breast changes you will experience include tenderness and tingling during the early part of your pregnancy. Your breasts will become larger. You may need to use a support bra. You may see a thin, yellow fluid, called colostrum, leak from your nipples during the second trimester. Colostrum is a liquid that changes to milk about 3 days after you give birth.    Skin changes and stretch marks may occur during your pregnancy. You may have red marks, called stretch marks, on your skin. Stretch marks will usually fade after pregnancy. Use lotion if your skin is dry and itchy. The skin on your face, around your nipples, and below your belly button may darken. Most of the time, your skin will return to its normal color after your baby is born.     Morning sickness is nausea and vomiting that can happen at any time of day. Avoid fatty and spicy foods. Eat small meals throughout the day instead of large meals. Sana may help to decrease nausea. Ask your healthcare provider about other ways of decreasing nausea and vomiting.    Heartburn may be caused by changes in your hormones during pregnancy. Your growing uterus may also push your stomach upward and force stomach acid to back up into your esophagus. Eat 4 or 5 small meals each day instead of large meals. Avoid spicy foods. Avoid eating right before bedtime.    Constipation may develop during your pregnancy. To treat constipation, eat foods high in fiber such as fiber cereals, beans, fruits, vegetables, whole-grain breads, and prune juice. Get regular exercise and drink plenty of water. Your healthcare provider may also suggest a fiber supplement to soften your bowel movements. Talk to your healthcare provider before you use any medicines to decrease constipation.    Hemorrhoids are enlarged veins in the rectal area. They may cause pain, itching, and bright red bleeding from your rectum. To decrease your risk of hemorrhoids, prevent constipation and do not strain to have a bowel movement. If you have hemorrhoids, soak in a tub of warm water to ease discomfort. Ask your healthcare provider how you can treat hemorrhoids.     Leg cramps and swelling may be caused by low calcium levels or the added weight of pregnancy. Raise your legs above the level of your heart to decrease swelling. During a leg cramp, stretch or massage the muscle that has the cramp. Heat may help decrease pain and muscle spasms. Apply heat on your muscle for 20 to 30 minutes every 2 hours for as many days as directed.    Back pain may occur as your baby grows. Do not stand for long periods of time or lift heavy items. Use good posture while you stand, squat, or bend. Wear low-heeled shoes with good support. Rest may also help to relieve back pain. Ask your healthcare provider about exercises you can do to strengthen your back muscles.     Stay healthy during your pregnancy:     Eat a variety of healthy foods. Healthy foods include fruits, vegetables, whole-grain breads, low-fat dairy foods, beans, lean meats, and fish. Drink liquids as directed. Ask how much liquid to drink each day and which liquids are best for you. Limit caffeine to less than 200 milligrams each day. Limit your intake of fish to 2 servings each week. Choose fish low in mercury such as canned light tuna, shrimp, crab, salmon, cod, or tilapia. Do not eat fish high in mercury such as swordfish, tilefish, wallace mackerel, and shark.     Take prenatal vitamins as directed. Your need for certain vitamins and minerals, such as folic acid, increases during pregnancy. Prenatal vitamins provide some of the extra vitamins and minerals you need. Prenatal vitamins may also help to decrease the risk of certain birth defects.     Ask how much weight you should gain during your pregnancy. Too much or too little weight gain can be unhealthy for you and your baby.     Talk to your healthcare provider about exercise. Moderate exercise can help you stay fit. Your healthcare provider will help you plan an exercise program that is safe for you during pregnancy.     Do not smoke. Smoking increases your risk of a miscarriage and other health problems during your pregnancy. Smoking can cause your baby to be born too early or weigh less at birth. Quit smoking as soon as you think you might be pregnant. Ask your healthcare provider for information if you need help quitting.    Do not drink alcohol. Alcohol passes from your body to your baby through the placenta. It can affect your baby's brain development and cause fetal alcohol syndrome (FAS). FAS is a group of conditions that causes mental, behavior, and growth problems.     Talk to your healthcare provider before you take any medicines. Many medicines may harm your baby if you take them when you are pregnant. Do not take any medicines, vitamins, herbs, or supplements without first talking to your healthcare provider. Never use illegal or street drugs (such as marijuana or cocaine) while you are pregnant.     Safety tips:     Avoid hot tubs and saunas. Do not use a hot tub or sauna while you are pregnant, especially during your first trimester. Hot tubs and saunas may raise your baby's temperature and increase the risk of birth defects.    Avoid toxoplasmosis. This is an infection caused by eating raw meat or being around infected cat feces. It can cause birth defects, miscarriages, and other problems. Wash your hands after you touch raw meat. Make sure any meat is well-cooked before you eat it. Avoid raw eggs and unpasteurized milk. Use gloves or ask someone else to clean your cat's litter box while you are pregnant.     Ask your healthcare provider about travel. The most comfortable time to travel is during the second trimester. Ask your healthcare provider if you can travel after 36 weeks. You may not be able to travel in an airplane after 36 weeks. He may also recommend that you avoid long road trips.

## 2022-05-13 NOTE — ED ADULT NURSE NOTE - NSICDXPASTSURGICALHX_GEN_ALL_CORE_FT
PAST SURGICAL HISTORY:  Delivery by  section of full-term infant     H/O cone biopsy of cervix     History of D&C dilation and evacuation

## 2022-05-13 NOTE — ED PROVIDER NOTE - NSFOLLOWUPCLINICS_GEN_ALL_ED_FT
[facility]  OB/GYN  [address] [place] [address]  Phone: [phone]  Fax:   Follow Up Time: 4-6 Days

## 2022-05-13 NOTE — CONSULT NOTE ADULT - ATTENDING COMMENTS
Early pregnancy with IUD in situ - early GA and hcg below discriminatory level - discussed with patient it is too early to determine where pregnancy is implanted but due to IUD she is at higher risk of ectopic pregnancy. Patient exam is benign at the moment.   Will trend hcg in 48h. GYN team to follow on beta-list.   Ectopic and bleeding precautions discussed in detail.

## 2022-05-13 NOTE — ED PROVIDER NOTE - CLINICAL SUMMARY MEDICAL DECISION MAKING FREE TEXT BOX
Pt with first trimester bleeding and abd pain, still with IUD, no iup on eval, was seen by gyn in ED, but will leave IUD in for now and continue r/o ectopic with beta list.

## 2022-05-13 NOTE — ED PROVIDER NOTE - PHYSICAL EXAMINATION
CONSTITUTIONAL: Well-developed; well-nourished; in no acute distress.   SKIN: warm, dry.  HEAD: Normocephalic; atraumatic.  EYES: PERRLA, EOMI, no conjunctival erythema.  ENT: No nasal discharge; airway clear. MMM.  NECK: Supple; non tender.  CARD: S1, S2 normal; no murmurs, gallops, or rubs. Regular rate and rhythm.   RESP: No wheezes, rales, or rhonchi. Lungs CTAB.  ABD: soft ntnd; no masses, rebound, or guarding.  EXT: Normal ROM. No clubbing, cyanosis, or edema.  NEURO: Alert, oriented, grossly unremarkable. No focal neuro. deficits. Ambulating with a  steady gait.  PSYCH: Cooperative, appropriate.

## 2022-05-13 NOTE — ED PROVIDER NOTE - PATIENT PORTAL LINK FT
You can access the FollowMyHealth Patient Portal offered by [facility] by registering at the following website: [email or web address]. By joining [employer]’s FollowMyHealth portal, you will also be able to view your health information using other applications (apps) compatible with our system.

## 2022-05-13 NOTE — ED ADULT NURSE NOTE - NSICDXFAMILYHX_GEN_ALL_CORE_FT
FAMILY HISTORY:  Family history of pancreatic cancer, uncle    Father  Still living? Unknown  Family history of hypertension, Age at diagnosis: Age Unknown  Family history of stomach cancer, Age at diagnosis: Age Unknown

## 2022-05-13 NOTE — ED PROVIDER NOTE - ATTENDING CONTRIBUTION TO CARE
33 yo F here with lower abdominal pain. Pt is a  one twin gestation here with positive home pregnancy test, and mild spotting. Exam as documented. P: labs, sono and reveval

## 2022-05-13 NOTE — CONSULT NOTE ADULT - ASSESSMENT
31yo  with uncertain LMP, with pregnancy of unknown location and Paragard IUD in situ, currently clinically and hemodynamically stable.    -Discussed increased risk of ectopic pregnancy given current IUD use  -As pregnancy is desired at this time, patient desires conservative management with repeat bhcg  -Ectopic precautions given  -Dispo pe ED    INCOMPLETE NOTE PENDING DISCUSSION WITH ATTENDING 31yo  with uncertain LMP, with pregnancy of unknown location and Paragard IUD in situ, currently clinically and hemodynamically stable.    -Discussed with patient that while overall pregnancy risk is lower with IUD use, if a patient becomes pregnant with an IUD in place, she is at increased risk for ectopic pregnancy  -As pregnancy is desired at this time, patient desires conservative management with repeat bhcg --> script sent through [facility], repeat   -Ectopic precautions given  -Dispo pe ED    Dr. Gutierrez and Dr. Dsouza aware 31yo  with uncertain LMP, with pregnancy of unknown location cannot r/o ectopic vs early IUP and Paragard IUD in situ, currently clinically and hemodynamically stable.    -Discussed with patient that while overall pregnancy risk is lower with IUD use, if a patient becomes pregnant with an IUD in place, she is at increased risk for ectopic pregnancy  -As pregnancy is desired at this time, patient desires conservative management with repeat bhcg --> script sent through [facility], repeat   -Ectopic precautions given  -Dispo pe ED    Dr. Gutierrez and Dr. Dsouza aware 31yo  with uncertain LMP, with pregnancy of unknown location cannot r/o ectopic vs early IUP and Paragard IUD in situ, currently clinically and hemodynamically stable.    -Discussed with patient that while overall pregnancy risk is lower with IUD use, if a patient becomes pregnant with an IUD in place, she is at increased risk for ectopic pregnancy. At this point pregnancy is too early to be identified on sonogram.   -As pregnancy is desired at this time, patient desires conservative management with repeat bhcg --> script sent through [facility], repeat   -Ectopic precautions given  -Dispo pe ED    Dr. Gutierrez and Dr. Dsouza aware

## 2022-05-13 NOTE — CONSULT NOTE ADULT - SUBJECTIVE AND OBJECTIVE BOX
PGY2 Note  Chief Complaint: positive pregnancy test    HPI: 31yo  LMP ~3-4w ago, here with positive home pregnancy test and light vaginal spotting. Patient reports that she had felt increased fatigue and nausea over the past few days which prompted her to take a home pregnancy test. She had some very minimal spotting over the last 3d. She denies fever, chills, vomiting, abdominal pain, dysuria. Currently has a Paragard IUD in place since . Unplanned but desired pregnancy. Last saw a GYN 1y ago in the [place].    Ob/Gyn History:                  LMP - ~3-4w ago (patient unsure of exact date)                 Cycle Length - irregular  H/o ovarian cysts, conservatively managed  H/o abnormal pap smears, s/p LEEP  Denies history of uterine fibroids or STIs  Last Pap Smear - >1y ago    OBHx:  G1: FT  x1, 4 month infant death (patient did not desire to offer further details)  G2: FT  x1  G3: FT LTCS x1, elective for suspected macrosomia  G4: IUFD @5 months (per patient), s/p D&E  G5: FT LTCS x1, elective repeat  G6: 32w LTCS for twin pregnancy, complicated by pre-eclampsia  G7: current    Denies the following: constitutional symptoms, visual symptoms, cardiovascular symptoms, respiratory symptoms, GI symptoms, musculoskeletal symptoms, skin symptoms, neurologic symptoms, hematologic symptoms, allergic symptoms, psychiatric symptoms  Except any pertinent positives listed.     Home Medications: none    Allergies: Nyquil Cold Medicine (Other)    PAST MEDICAL & SURGICAL HISTORY:  No pertinent past medical history    Delivery by  section of full-term infant  History of D&E  H/O cone biopsy of cervix          FAMILY HISTORY:  Family history of hypertension (Father)  Family history of stomach cancer (Father)  Family history of pancreatic cancer (uncle)    SOCIAL HISTORY: Current daily marijuana smoker, current daily cigarette smoker (3-4 cigarettes per day); Denies other illicit drug use    Vital Signs Last 24 Hrs  T(F): 98.8 (13 May 2022 10:19), Max: 98.8 (13 May 2022 10:19)  HR: 85 (13 May 2022 10:19) (85 - 85)  BP: 136/82 (13 May 2022 10:19) (136/82 - 136/82)  RR: 18 (13 May 2022 10:19) (18 - 18)  Height (cm): 157.5 (22 @ 10:19)    General Appearance - AAOx3, NAD  Heart - S1S2 regular rate and rhythm  Lung - CTA Bilaterally  Abdomen - Soft, nontender, nondistended, no rebound, no rigidity, no guarding, bowel sounds present    GYN/Pelvis:    Labia Majora - Normal  Labia Minora - Normal  Clitoris - Normal  Urethra - Normal  Vagina - No blood or abnormal discharge noted  Cervix - No bleeding, no abnormal discharge, closed, no CMT    Uterus:  Size - difficult to palpate due to patient body habitus  Tenderness - None  Mass - difficult to palpate due to patient body habitus  Freely mobile    Adnexa:  Masses - difficult to palpate due to patient body habitus  Tenderness - None      Meds:     Height (cm): 157.5 (22 @ 10:19)    LABS:                        10.9   6.03  )-----------( 288      ( 13 May 2022 10:48 )             34.5     HCG Quantitative, Serum: 680.3 mIU/mL (22 @ 10:48)          136  |  106  |  8<L>  ----------------------------<  83  4.2   |  19  |  0.6<L>    Ca    9.0      13 May 2022 10:48    TPro  6.8  /  Alb  4.4  /  TBili  0.5  /  DBili  x   /  AST  18  /  ALT  11  /  AlkPhos  96        Urinalysis Basic - ( 13 May 2022 10:48 )    Color: Yellow / Appearance: Clear / S.020 / pH: x  Gluc: x / Ketone: Negative  / Bili: Negative / Urobili: <2 mg/dL   Blood: x / Protein: Trace / Nitrite: Negative   Leuk Esterase: Negative / RBC: x / WBC x   Sq Epi: x / Non Sq Epi: x / Bacteria: x    RADIOLOGY & ADDITIONAL STUDIES:  < from: US Transvaginal (22 @ 13:44) >    ACC: [id number] EXAM:  US TRANSVAGINAL                          PROCEDURE DATE:  2022          INTERPRETATION:  CLINICAL INFORMATION: Lower abdominal pain    LMP: Pregnant, beta hCG 680.3    COMPARISON: 2020..    TECHNIQUE:  Transabdominal and transvaginal ultrasound the pelvis is performed    FINDINGS:  Uterus: 9.4 cm x 5.8 cm x 6.4 cm..  Endometrium: An IUD is present. There is no definite evidence of   gestational sac on this examination. However, a small gestational sac may   be obscured by IUD artifact...    Right ovary: 4.1 cm x 2.5 cm x 2.5 cm. There are 2 complex right ovarian   cysts measuring 1.6 cm and 1.9 cm, likely representing hemorrhagic cysts.   Adjacent to the right ovary is a 0.5 x 0.7 x 0.5 cm thick-walled cystic   structure. A right ectopic pregnancy cannot be excluded on this   examination.  Left ovary: 2.2 cm x 1.9 cm x 1.5 cm. Within normal limits..    IMPRESSION:    Right adnexal thick-walled cystic structure measuring 0.7 cm. Right   ectopic pregnancy cannot be excluded on this examination.    No definite evidence of gestational sac within the endometrium. However,   a small gestational sac may be obscured by IUD artifact    Two complex right ovarian cysts, likely hemorrhagic    Spoke with DANETTE CAPONE on 2022 2:09 PM with readback.    --- End of Report ---    < end of copied text >

## 2022-05-13 NOTE — ED PROVIDER NOTE - OBJECTIVE STATEMENT
32 year old f took home pregnancy test this morning that came back positive, also endorsing light vaginal spotting. Has IUD in place. +increased fatigue and nausea over the past few days. Denies fevers/chills, no vomiting.

## 2022-05-14 LAB
CULTURE RESULTS: SIGNIFICANT CHANGE UP
SPECIMEN SOURCE: SIGNIFICANT CHANGE UP

## 2022-05-16 ENCOUNTER — EMERGENCY (EMERGENCY)
Facility: HOSPITAL | Age: 32
LOS: 0 days | Discharge: LEFT BEFORE DISCHARGE | End: 2022-05-17
Attending: EMERGENCY MEDICINE | Admitting: EMERGENCY MEDICINE
Payer: MEDICAID

## 2022-05-16 VITALS
TEMPERATURE: 98 F | OXYGEN SATURATION: 100 % | HEIGHT: 62 IN | DIASTOLIC BLOOD PRESSURE: 75 MMHG | HEART RATE: 78 BPM | SYSTOLIC BLOOD PRESSURE: 121 MMHG | RESPIRATION RATE: 18 BRPM | WEIGHT: 199.96 LBS

## 2022-05-16 DIAGNOSIS — O20.9 HEMORRHAGE IN EARLY PREGNANCY, UNSPECIFIED: ICD-10-CM

## 2022-05-16 DIAGNOSIS — Z20.822 CONTACT WITH AND (SUSPECTED) EXPOSURE TO COVID-19: ICD-10-CM

## 2022-05-16 DIAGNOSIS — Z88.8 ALLERGY STATUS TO OTHER DRUGS, MEDICAMENTS AND BIOLOGICAL SUBSTANCES: ICD-10-CM

## 2022-05-16 DIAGNOSIS — Z87.59 PERSONAL HISTORY OF OTHER COMPLICATIONS OF PREGNANCY, CHILDBIRTH AND THE PUERPERIUM: ICD-10-CM

## 2022-05-16 DIAGNOSIS — Z53.29 PROCEDURE AND TREATMENT NOT CARRIED OUT BECAUSE OF PATIENT'S DECISION FOR OTHER REASONS: ICD-10-CM

## 2022-05-16 DIAGNOSIS — Z98.890 OTHER SPECIFIED POSTPROCEDURAL STATES: Chronic | ICD-10-CM

## 2022-05-16 DIAGNOSIS — O26.30 RETAINED INTRAUTERINE CONTRACEPTIVE DEVICE IN PREGNANCY, UNSPECIFIED TRIMESTER: ICD-10-CM

## 2022-05-16 LAB
APPEARANCE UR: CLEAR — SIGNIFICANT CHANGE UP
APTT BLD: 32.7 SEC — SIGNIFICANT CHANGE UP (ref 27–39.2)
BASOPHILS # BLD AUTO: 0.07 K/UL — SIGNIFICANT CHANGE UP (ref 0–0.2)
BASOPHILS NFR BLD AUTO: 0.9 % — SIGNIFICANT CHANGE UP (ref 0–1)
BILIRUB UR-MCNC: NEGATIVE — SIGNIFICANT CHANGE UP
COLOR SPEC: YELLOW — SIGNIFICANT CHANGE UP
DIFF PNL FLD: NEGATIVE — SIGNIFICANT CHANGE UP
EOSINOPHIL # BLD AUTO: 0.16 K/UL — SIGNIFICANT CHANGE UP (ref 0–0.7)
EOSINOPHIL NFR BLD AUTO: 2 % — SIGNIFICANT CHANGE UP (ref 0–8)
GLUCOSE UR QL: NEGATIVE — SIGNIFICANT CHANGE UP
HCT VFR BLD CALC: 34.7 % — LOW (ref 37–47)
HGB BLD-MCNC: 10.9 G/DL — LOW (ref 12–16)
IMM GRANULOCYTES NFR BLD AUTO: 0.4 % — HIGH (ref 0.1–0.3)
INR BLD: 1 RATIO — SIGNIFICANT CHANGE UP (ref 0.65–1.3)
KETONES UR-MCNC: SIGNIFICANT CHANGE UP
LEUKOCYTE ESTERASE UR-ACNC: NEGATIVE — SIGNIFICANT CHANGE UP
LYMPHOCYTES # BLD AUTO: 2.63 K/UL — SIGNIFICANT CHANGE UP (ref 1.2–3.4)
LYMPHOCYTES # BLD AUTO: 32.1 % — SIGNIFICANT CHANGE UP (ref 20.5–51.1)
MCHC RBC-ENTMCNC: 26 PG — LOW (ref 27–31)
MCHC RBC-ENTMCNC: 31.4 G/DL — LOW (ref 32–37)
MCV RBC AUTO: 82.6 FL — SIGNIFICANT CHANGE UP (ref 81–99)
MONOCYTES # BLD AUTO: 0.48 K/UL — SIGNIFICANT CHANGE UP (ref 0.1–0.6)
MONOCYTES NFR BLD AUTO: 5.9 % — SIGNIFICANT CHANGE UP (ref 1.7–9.3)
NEUTROPHILS # BLD AUTO: 4.83 K/UL — SIGNIFICANT CHANGE UP (ref 1.4–6.5)
NEUTROPHILS NFR BLD AUTO: 58.7 % — SIGNIFICANT CHANGE UP (ref 42.2–75.2)
NITRITE UR-MCNC: NEGATIVE — SIGNIFICANT CHANGE UP
NRBC # BLD: 0 /100 WBCS — SIGNIFICANT CHANGE UP (ref 0–0)
PH UR: 6 — SIGNIFICANT CHANGE UP (ref 5–8)
PLATELET # BLD AUTO: 363 K/UL — SIGNIFICANT CHANGE UP (ref 130–400)
PROT UR-MCNC: SIGNIFICANT CHANGE UP
PROTHROM AB SERPL-ACNC: 11.5 SEC — SIGNIFICANT CHANGE UP (ref 9.95–12.87)
RBC # BLD: 4.2 M/UL — SIGNIFICANT CHANGE UP (ref 4.2–5.4)
RBC # FLD: 15.8 % — HIGH (ref 11.5–14.5)
SP GR SPEC: 1.03 — HIGH (ref 1.01–1.03)
UROBILINOGEN FLD QL: ABNORMAL
WBC # BLD: 8.2 K/UL — SIGNIFICANT CHANGE UP (ref 4.8–10.8)
WBC # FLD AUTO: 8.2 K/UL — SIGNIFICANT CHANGE UP (ref 4.8–10.8)

## 2022-05-16 PROCEDURE — 99285 EMERGENCY DEPT VISIT HI MDM: CPT

## 2022-05-16 RX ORDER — SODIUM CHLORIDE 9 MG/ML
1000 INJECTION INTRAMUSCULAR; INTRAVENOUS; SUBCUTANEOUS ONCE
Refills: 0 | Status: COMPLETED | OUTPATIENT
Start: 2022-05-16 | End: 2022-05-16

## 2022-05-16 RX ADMIN — SODIUM CHLORIDE 1000 MILLILITER(S): 9 INJECTION INTRAMUSCULAR; INTRAVENOUS; SUBCUTANEOUS at 23:16

## 2022-05-16 NOTE — CHART NOTE - NSCHARTNOTEFT_GEN_A_CORE
PGY3 Note    I was able to contact patient via telephone.  Reports she has noted vaginal bleeding today after intercourse.  Denies abdominal pain.  Explain that her bhcg today was 950, which is a 28% increase from 48 hours ago.  This is an inappropriate rise.  Patient was already on her way to the emergency room during the phone call. Will inform emergency room black box.     Dr. Hernandez aware.

## 2022-05-16 NOTE — ED ADULT NURSE NOTE - NSELOPED_ED_ALL_ED
Patient and/or family announced that they are leaving. They were advised to stay, advised to return if worse. Patient and/or family announced that they are leaving. They were advised to stay, advised to return if worse./Patient's chart was referred to charge nurse/supervisor for disposition of prescription and/or discharge instructions./Physician notified

## 2022-05-16 NOTE — ED ADULT NURSE NOTE - NS ED NURSE ELOPE COMMENTS
patient stated she was not going to stay for discharge. IV access removed.  Patient A&Ox4, ambulated with a steady gait, in stable condition and left the ED.  MD lenz

## 2022-05-16 NOTE — CHART NOTE - NSCHARTNOTEFT_GEN_A_CORE
PGY3 Note    Spoke to patient via telephone. Reminded her to completed cg today.  Reports 1 episode of spotting yesterday after intercourse.  Otherwise denies pelvic pain or vaginal bleeding.  Ectopic precautions discussed again, instructed to come to the hospital if she notices pain or heavy bleeding.  Patient states she will go to lab this morning.  Will call her with results.    Dr. Hernandez to be made aware.

## 2022-05-16 NOTE — CHART NOTE - NSCHARTNOTEFT_GEN_A_CORE
PGY1 Note    Patient's repeat beta hcg returned today, 950 (28% increase), abnormal rise. Patient likely with ectopic pregnancy. Called patient to call her in to the ED for Methotrexate vs. Surgical management, no answer. Will try again in 15 minutes. If no answer, will call patient's emergency contact number.     Dr Palma and Dr Hernandez aware. PGY1 Note    Patient's repeat beta hcg returned today, 950 (28% increase), abnormal rise. Patient likely with ectopic pregnancy. Called patient to call her in to the ED for Methotrexate vs. Surgical management, no answer and unable to leave voicemail. Will try again in 15 minutes. If no answer, will call patient's emergency contact number.     Dr Palma and Dr Hernandez aware.

## 2022-05-17 VITALS
OXYGEN SATURATION: 100 % | DIASTOLIC BLOOD PRESSURE: 77 MMHG | TEMPERATURE: 98 F | SYSTOLIC BLOOD PRESSURE: 128 MMHG | HEART RATE: 76 BPM | RESPIRATION RATE: 18 BRPM

## 2022-05-17 LAB
ALBUMIN SERPL ELPH-MCNC: 4.4 G/DL — SIGNIFICANT CHANGE UP (ref 3.5–5.2)
ALP SERPL-CCNC: 97 U/L — SIGNIFICANT CHANGE UP (ref 30–115)
ALT FLD-CCNC: 10 U/L — SIGNIFICANT CHANGE UP (ref 0–41)
ANION GAP SERPL CALC-SCNC: 13 MMOL/L — SIGNIFICANT CHANGE UP (ref 7–14)
AST SERPL-CCNC: 15 U/L — SIGNIFICANT CHANGE UP (ref 0–41)
BILIRUB SERPL-MCNC: 0.2 MG/DL — SIGNIFICANT CHANGE UP (ref 0.2–1.2)
BLD GP AB SCN SERPL QL: SIGNIFICANT CHANGE UP
BUN SERPL-MCNC: 11 MG/DL — SIGNIFICANT CHANGE UP (ref 10–20)
CALCIUM SERPL-MCNC: 9.5 MG/DL — SIGNIFICANT CHANGE UP (ref 8.5–10.1)
CHLORIDE SERPL-SCNC: 105 MMOL/L — SIGNIFICANT CHANGE UP (ref 98–110)
CO2 SERPL-SCNC: 21 MMOL/L — SIGNIFICANT CHANGE UP (ref 17–32)
CREAT SERPL-MCNC: 0.6 MG/DL — LOW (ref 0.7–1.5)
EGFR: 121 ML/MIN/1.73M2 — SIGNIFICANT CHANGE UP
GLUCOSE SERPL-MCNC: 91 MG/DL — SIGNIFICANT CHANGE UP (ref 70–99)
HCG SERPL-ACNC: 1093 MIU/ML — HIGH
POTASSIUM SERPL-MCNC: 4.1 MMOL/L — SIGNIFICANT CHANGE UP (ref 3.5–5)
POTASSIUM SERPL-SCNC: 4.1 MMOL/L — SIGNIFICANT CHANGE UP (ref 3.5–5)
PROT SERPL-MCNC: 6.7 G/DL — SIGNIFICANT CHANGE UP (ref 6–8)
SARS-COV-2 RNA SPEC QL NAA+PROBE: SIGNIFICANT CHANGE UP
SODIUM SERPL-SCNC: 139 MMOL/L — SIGNIFICANT CHANGE UP (ref 135–146)

## 2022-05-17 PROCEDURE — 93010 ELECTROCARDIOGRAM REPORT: CPT

## 2022-05-17 PROCEDURE — 99283 EMERGENCY DEPT VISIT LOW MDM: CPT

## 2022-05-17 PROCEDURE — 76817 TRANSVAGINAL US OBSTETRIC: CPT | Mod: 26

## 2022-05-17 PROCEDURE — 76830 TRANSVAGINAL US NON-OB: CPT | Mod: 26

## 2022-05-17 NOTE — CONSULT NOTE ADULT - ASSESSMENT
31yo  with uncertain LMP, with pregnancy of unknown location cannot r/o ectopic vs early IUP and Paragard IUD in situ, suspicious for ectopic pregnancy, currently clinically and hemodynamically stable.    -Discussed with patient that while overall pregnancy risk is lower with IUD use, if a patient becomes pregnant with an IUD in place, she is at increased risk for ectopic pregnancy. At this point, due to abnormally rising bhCG values, this is not a normal IUP, and is highly suspicious of an ectopic pregnancy. I offered patient both surgical and medical management with methotrexate. We discussed the r/b/a of both at length. I explained to the patient that ectopic pregnancies are not normal healthy pregnancies and if they are to rupture they can be life threatening. Patient continued to refuse and wished to repeat another bhcg level. Patient voiced understanding of the risks including bleeding and death. She also declined removal of her IUD despite this discussion.  -Patient desires conservative management at this time. Script sent for repeat bhcg level on  through all scripts.  -ectopic precautions given, patient voiced understanding  -dispo per ED.    Dr. Valenzuela and Dr. Arrieta aware    INCOMPLETE NOTE. 33yo  with uncertain LMP, with pregnancy of unknown location and abnormally rising bHCG, cannot r/o ectopic vs abnormal IUP, Paragard IUD in situ, currently clinically and hemodynamically stable.    -Discussed with patient that while overall pregnancy risk is lower with IUD use, if a patient does become pregnant, she is at increased risk for ectopic pregnancy while the IUD is in place. Due to abnormally rising bhCG values, we suspect an abnormal IUP or ectopic pregnancy. I offered patient both surgical or medical management with methotrexate. We discussed the r/b/a of both at length. I advised the physiology and progression of an ectopic pregnancy, and its risks of internal hemorrhage, instability, and possible death. Teach back was performed and patient continued to refuse any management at this point. She also declined removal of her IUD despite this discussion.  -Patient desires conservative management at this time. Script sent for repeat bhcg level on  through all scripts.  -advised to continue monitoring symptoms and always stay in someone else's company, who will know how to proceed in case of an emergency  -dispo per ED.    Dr. Valenzuela and Dr. Arrieta aware

## 2022-05-17 NOTE — CONSULT NOTE ADULT - ATTENDING COMMENTS
pt with known early pregnancy and abnormally rising HCG level here with small amt of postcoital bleeding  ddx includes abnormal IUP vs ectopic pregnancy with IUD in situ  pt advised of this, recommend D&C to determine pregnancy location, followed by methotrexate if determined to be ectopic  pt refusing any management, desiring to continue expectant management, hoping pregnancy will be normal  ectopic precautions given

## 2022-05-17 NOTE — ED PROVIDER NOTE - NS ED ATTENDING STATEMENT MOD
This was a shared visit with the ANN. I reviewed and verified the documentation and independently performed the documented:

## 2022-05-17 NOTE — CONSULT NOTE ADULT - SUBJECTIVE AND OBJECTIVE BOX
PGY 1 Note:     Chief Complaint: abnormally rising bHCG levels    HPI:  33yo  LMP ~3-4w ago well known to gyn service as beta list patient. Patient presented to the ED by request for abnormally rising bhcg values. She reports vaginal spotting after intercourse a few hours prior but, denies saturating pads or passing clots. She denies abdominal pain. She denies lightheadedness, dizziness, SOB, palpitations fevers, chills, nausea, vomiting, diarrhea, dysuria or urinary frequency. Patient currently has a Paraguard IUD in place since . Unplanned but, desired pregnancy. Last saw a GYN 1 year ago in the [place].    HCG Trend:  : 680   (outpatient): 950   (in ED): 1093    Ob/Gyn History:      LMP - 3-4 weeks ago (patient unsure of exact date)       Cycle Length - irregular  H/o ovarian cysts, conservatively managed  H/o abnormal pap smears, s/p LEEP  Denies history of uterine fibroids or STIs  Last Pap Smear - >1y ago    OBHx:  G1: FT  x1, 4 month infant death (patient did not desire to offer further details)  G2: FT  x1  G3: FT LTCS x1, elective for suspected macrosomia  G4: IUFD @5 months (per patient), s/p D&E  G5: FT LTCS x1, elective repeat  G6: 32w LTCS for twin pregnancy, complicated by pre-eclampsia  G7: current    Home Medications:  acetaminophen 325 mg oral tablet: 3 tab(s) orally  (2019 08:39)  docusate sodium 100 mg oral capsule: 1 cap(s) orally 2 times a day (2019 08:39)  ibuprofen 600 mg oral tablet: 1 tab(s) orally every 6 hours (2019 08:39)  simethicone 80 mg oral tablet, chewable: 1 tab(s) orally every 6 hours (2019 08:39)    Allergies    Nyquil Cold Medicine (Other)    PAST MEDICAL & SURGICAL HISTORY:  No pertinent past medical history    Delivery by  section of full-term infant    History of D&amp;C  dilation and evacuation    H/O cone biopsy of cervix    FAMILY HISTORY:  Family history of hypertension (Father)    Family history of stomach cancer (Father)    Family history of pancreatic cancer  uncle      SOCIAL HISTORY: Denies cigarette use, alcohol use, or illicit drug use    Vital Signs Last 24 Hrs  T(F): 98.2 (17 May 2022 00:10), Max: 98.2 (17 May 2022 00:10)  HR: 76 (17 May 2022 00:10) (76 - 78)  BP: 128/77 (17 May 2022 00:10) (121/75 - 128/77)  RR: 18 (17 May 2022 00:10) (18 - 18)  Height (cm): 157.5 (22 @ 21:35)  Weight (kg): 90.7 (22 @ 21:35)  BMI (kg/m2): 36.6 (22 @ 21:35)  BSA (m2): 1.91 (22 @ 21:35)    General Appearance - AAOx3, NAD  Heart - S1S2 regular rate and rhythm  Lung - CTA Bilaterally  Abdomen - Soft, nontender, nondistended, no rebound, no rigidity, no guarding, bowel sounds present  External genitalia: normal appearing female genitalia  vaginal mucosa: rugaeted, no lesions  Uterus: size difficult to palpate due to patient body habitus, no tenderenss, no masses, freely mobile.  Adnexa: difficult to palpate due to body habitus    Meds:   sodium chloride 0.9% Bolus 1000 milliLiter(s) IV Bolus once    Height (cm): 157.5 (22 @ 21:35)  Weight (kg): 90.7 (22 @ 21:35)  BMI (kg/m2): 36.6 (22 @ 21:35)  BSA (m2): 1.91 (22 @ 21:35)    LABS:                        10.9   8.20  )-----------( 363      ( 16 May 2022 23:25 )             34.7     HCG Quantitative, Serum: 1093.0 mIU/mL (22 @ 23:25)  HCG Quantitative, Serum: 680.3 mIU/mL (22 @ 10:48)    ABO RH Interpretation: O POS (22 23:44)  Antibody Screen: NEG (22 23:44)        139  |  105  |  11  ----------------------------<  91  4.1   |  21  |  0.6<L>    Ca    9.5      16 May 2022 23:25    TPro  6.7  /  Alb  4.4  /  TBili  0.2  /  DBili  x   /  AST  15  /  ALT  10  /  AlkPhos  97  05-16    PT/INR - ( 16 May 2022 23:25 )   PT: 11.50 sec;   INR: 1.00 ratio         PTT - ( 16 May 2022 23:25 )  PTT:32.7 sec  Urinalysis Basic - ( 16 May 2022 23:25 )    Color: Yellow / Appearance: Clear / S.031 / pH: x  Gluc: x / Ketone: Trace  / Bili: Negative / Urobili: 6 mg/dL   Blood: x / Protein: Trace / Nitrite: Negative   Leuk Esterase: Negative / RBC: x / WBC x   Sq Epi: x / Non Sq Epi: x / Bacteria: x    Culture - Urine (collected 22 @ 10:48)  Source: Clean Catch Clean Catch (Midstream)  Final Report (22 @ 22:45):    <10,000 CFU/mL Normal Urogenital Ariana    RADIOLOGY & ADDITIONAL STUDIES:    < from: US Transvaginal (22 @ 02:42) >  US TRANSVAGINAL                          PROCEDURE DATE:  2022          INTERPRETATION:  CLINICAL INFORMATION: Ectopic pregnancy, pelvic pain    COMPARISON: 2022.    TECHNIQUE:  Endovaginal and transabdominal pelvic sonogram. Color and Spectral   Doppler was performed.    FINDINGS:  Uterus: 9.8 cm x 5.3 cm x 5.4 cm. Within normal limits.  Endometrium: There is an intrauterine device. No definite gestational sac   is seen..    Right ovary: 3.7 cm x 2.6 cm x 2.3 cm. Within the right ovary, there is a   complex probable corpus luteal cyst measuring 2 x 1.5 x 1.7 cm..  Left ovary: 2.2 cm x 1.1 cm x 1.6 cm. within normal limits. Normal   Doppler signal is noted    Fluid: None.      IMPRESSION:      No definite gestational sac. Please correlate with serial hCG and   follow-up ultrasound.    IUD is noted within the endometrial cavity    --- End of Report ---     PGY 1 Note:     Chief Complaint: abnormally rising bHCG levels    HPI:  31yo  LMP ~3-4w ago well known to gyn service as beta list patient. Patient presented to the ED by request of GYN team for abnormally rising bHCG values. She reports vaginal spotting after intercourse a few hours prior, but denies saturating pads or passing clots. She denies abdominal pain, lightheadedness, dizziness, SOB, palpitations fevers, chills, nausea, vomiting, diarrhea, dysuria or urinary frequency. Patient currently has a Paraguard IUD in place since . Unplanned but, desired pregnancy. Last saw a GYN 1 year ago in the [place].    HCG Trend:   @1000: 680   @1000(outpatient): 950   @2300 (in ED): 1093    Gyn History:  LMP - 3-4 weeks ago (patient unsure of exact date)       Cycle Length - irregular  H/o ovarian cysts, conservatively managed  H/o abnormal pap smears, s/p LEEP  Denies history of uterine fibroids or STIs  Last Pap Smear - >1y ago, normal per patient    OB History:  : FT  x1, 4 month infant death (patient did not desire to offer further details)  G2: FT  x1  G3: FT LTCS x1, elective for suspected macrosomia  G4: IUFD @5 months (per patient), s/p D&E  G5: FT LTCS x1, elective repeat  G6: 32w LTCS for twin pregnancy, complicated by pre-eclampsia  G7: current    Home Medications:  acetaminophen 325 mg oral tablet: 3 tab(s) orally  (2019 08:39)  docusate sodium 100 mg oral capsule: 1 cap(s) orally 2 times a day (2019 08:39)  ibuprofen 600 mg oral tablet: 1 tab(s) orally every 6 hours (2019 08:39)  simethicone 80 mg oral tablet, chewable: 1 tab(s) orally every 6 hours (2019 08:39)    Allergies    Nyquil Cold Medicine (Other)    PAST MEDICAL & SURGICAL HISTORY:  No pertinent past medical history    Delivery by  section of full-term infant    History of D&amp;C  dilation and evacuation    H/O cone biopsy of cervix    FAMILY HISTORY:  Family history of hypertension (Father)    Family history of stomach cancer (Father)    Family history of pancreatic cancer  uncle      SOCIAL HISTORY: Denies cigarette use, alcohol use, or illicit drug use    Vital Signs Last 24 Hrs  T(F): 98.2 (17 May 2022 00:10), Max: 98.2 (17 May 2022 00:10)  HR: 76 (17 May 2022 00:10) (76 - 78)  BP: 128/77 (17 May 2022 00:10) (121/75 - 128/77)  RR: 18 (17 May 2022 00:10) (18 - 18)  Height (cm): 157.5 (22 @ 21:35)  Weight (kg): 90.7 (22 @ 21:35)  BMI (kg/m2): 36.6 (22 @ 21:35)  BSA (m2): 1.91 (22 @ 21:35)    General Appearance - AAOx3, NAD  Heart - S1S2 regular rate and rhythm  Lung - CTA Bilaterally  Abdomen - Soft, nontender, nondistended, no rebound, no rigidity, no guarding, bowel sounds present  External genitalia: normal appearing female genitalia  Vaginal mucosa: rugated, no lesions, no blood  Cervix: no CMT, closed, no lesions, no bleeding  Uterus: size difficult to palpate due to patient body habitus, no tenderness, no masses, freely mobile.  Adnexa: difficult to palpate due to body habitus      LABS:                        10.9   8.20  )-----------( 363      ( 16 May 2022 23:25 )             34.7     HCG Quantitative, Serum: 1093.0 mIU/mL (22 @ 23:25)  HCG Quantitative, Serum: 680.3 mIU/mL (22 @ 10:48)    ABO RH Interpretation: O POS (22 @ 23:44)  Antibody Screen: NEG (22 @ 23:44)        139  |  105  |  11  ----------------------------<  91  4.1   |  21  |  0.6<L>    Ca    9.5      16 May 2022 23:25    TPro  6.7  /  Alb  4.4  /  TBili  0.2  /  DBili  x   /  AST  15  /  ALT  10  /  AlkPhos  97      PT/INR - ( 16 May 2022 23:25 )   PT: 11.50 sec;   INR: 1.00 ratio         PTT - ( 16 May 2022 23:25 )  PTT:32.7 sec  Urinalysis Basic - ( 16 May 2022 23:25 )    Color: Yellow / Appearance: Clear / S.031 / pH: x  Gluc: x / Ketone: Trace  / Bili: Negative / Urobili: 6 mg/dL   Blood: x / Protein: Trace / Nitrite: Negative   Leuk Esterase: Negative / RBC: x / WBC x   Sq Epi: x / Non Sq Epi: x / Bacteria: x    Culture - Urine (collected 22 @ 10:48)  Source: Clean Catch Clean Catch (Midstream)  Final Report (22 @ 22:45):    <10,000 CFU/mL Normal Urogenital Ariana    RADIOLOGY & ADDITIONAL STUDIES:    < from: US Transvaginal (22 @ 02:42) >  US TRANSVAGINAL                          PROCEDURE DATE:  2022          INTERPRETATION:  CLINICAL INFORMATION: Ectopic pregnancy, pelvic pain    COMPARISON: 2022.    TECHNIQUE:  Endovaginal and transabdominal pelvic sonogram. Color and Spectral   Doppler was performed.    FINDINGS:  Uterus: 9.8 cm x 5.3 cm x 5.4 cm. Within normal limits.  Endometrium: There is an intrauterine device. No definite gestational sac   is seen..    Right ovary: 3.7 cm x 2.6 cm x 2.3 cm. Within the right ovary, there is a   complex probable corpus luteal cyst measuring 2 x 1.5 x 1.7 cm..  Left ovary: 2.2 cm x 1.1 cm x 1.6 cm. within normal limits. Normal   Doppler signal is noted    Fluid: None.      IMPRESSION:      No definite gestational sac. Please correlate with serial hCG and   follow-up ultrasound.    IUD is noted within the endometrial cavity    --- End of Report ---

## 2022-05-17 NOTE — ED PROVIDER NOTE - ATTENDING APP SHARED VISIT CONTRIBUTION OF CARE
32F , IUD in place, called back to ED by ObGyn for possible EP. Seen in ED  for vag bleeding, US showing poss EP, had op repeat hcg showing inappropriately slow rise, was called back to ED by ObGyn to discuss medical vs. surgical mgmt. Rpts continued spotting since last visit. Denies dizziness, cp/sob, nvd, abd pain, flank pain, urinary sx, rash.     PE:  nad  skin warm, dry  ncat  neck supple  rrr nl s1s2 no mrg  ctab no wrr  abd soft ntnd no palpable masses no rgr  back non-tender no cvat  ext no cce dpi  neuro aaox3 grossly nf exam

## 2022-05-17 NOTE — ED PROVIDER NOTE - OBJECTIVE STATEMENT
pt  sent back to ED by GYN 2/2 too slowly rising beta hcg, concern for poss ectopic. Denies fever/chill/HA/dizziness/chest pain/palpitation/sob/abd pain/n/v/d/ black stool/bloody stool/urinary sxs

## 2022-05-17 NOTE — ED PROVIDER NOTE - CLINICAL SUMMARY MEDICAL DECISION MAKING FREE TEXT BOX
IUD w/spotting, poss EP on 5/13 pelvic US, inappropriately rising hcg, today 700s still w/o definitive iup on US - pt eloped prior to final recommendations were discussed with ED team by obgyn, however consult note reviewed and indicates that pt was offered surgical/medical mgmt of suspected abnormal pregnancy / poss EP but pt declined any interventions @ this time, and was given Rx for repeat hcg 5/18

## 2022-05-17 NOTE — ED PROVIDER NOTE - PHYSICAL EXAMINATION
CONSTITUTIONAL: Well-appearing; well-nourished; in no apparent distress.   NECK: Supple; non-tender; no cervical lymphadenopathy.   CARDIOVASCULAR: Normal S1, S2; no murmurs, rubs, or gallops.   RESPIRATORY: Normal chest excursion with respiration; breath sounds clear and equal bilaterally; no wheezes, rhonchi, or rales.  GI/: pelvic deferred to GYN; non-distended; non-tender; no palpable organomegaly.   MS: Normal ROM in all four extremities; non-tender to palpation; distal pulses are normal.   SKIN: Normal for age and race; warm; dry; good turgor; no apparent lesions or exudate.   NEURO/PSYCH: A & O x 4; grossly unremarkable. mood and manner are appropriate.

## 2022-05-18 DIAGNOSIS — Z32.01 ENCOUNTER FOR PREGNANCY TEST, RESULT POSITIVE: ICD-10-CM

## 2022-05-18 LAB
CULTURE RESULTS: SIGNIFICANT CHANGE UP
HCG SERPL-MCNC: 1677 MIU/ML
SPECIMEN SOURCE: SIGNIFICANT CHANGE UP

## 2022-05-18 NOTE — CHART NOTE - NSCHARTNOTEFT_GEN_A_CORE
PGY2 Note    Called patient to remind her to complete repeat bhcg. Ectopic precautions given again. Patient strongly advised to present to ED with any pain or bleeding. She verbalized understanding.    Will f/u results.    Dr. Hernandez to be made aware, Dr. Dsouza aware PGY2 Note    Called patient to remind her to complete repeat bhcg. Ectopic precautions given again. Advised patient that allowing an abnormal/likely ectopic pregnancy to continue poses significant maternal risks including possible rupture, need for emergent surgery, life-threatening hemorrhage, possible blood transfusion or even maternal death. Patient strongly advised to present to ED with any pain or bleeding. She verbalized understanding of risks.    Will f/u results.    Dr. Hernandez to be made aware, Dr. Dsouza aware

## 2022-05-18 NOTE — CHART NOTE - NSCHARTNOTEFT_GEN_A_CORE
PGY 1 Note    Patient called to make aware of rising hormone level, however made aware that this is not diagnostic of an intrauterine pregnancy. Patient is aware she has a sonogram appointment with Dr Hernandez tomorrow at 1400.     Dr Palma and Dr Hernandez aware

## 2022-05-19 LAB — HCG SERPL-MCNC: 950.8 MIU/ML

## 2022-05-19 NOTE — CHART NOTE - NSCHARTNOTEFT_GEN_A_CORE
PGY1 note    Patient originally had a sonogram and OBGYN appointment today, however was unable to make it due to other responsibilities. Patient rescheduled for tomorrow morning, 10AM sonogram with appointment at [facility] afterwards. Patient feeling well. Ectopic pregnancy precautions reviewed. Will follow up tomorrow.     Dr Hernandez and Dr Dsouza aware

## 2022-05-20 ENCOUNTER — ASOB RESULT (OUTPATIENT)
Age: 32
End: 2022-05-20

## 2022-05-20 ENCOUNTER — APPOINTMENT (OUTPATIENT)
Dept: OBGYN | Facility: CLINIC | Age: 32
End: 2022-05-20

## 2022-05-20 ENCOUNTER — OUTPATIENT (OUTPATIENT)
Dept: OUTPATIENT SERVICES | Facility: HOSPITAL | Age: 32
LOS: 1 days | Discharge: HOME | End: 2022-05-20

## 2022-05-20 ENCOUNTER — APPOINTMENT (OUTPATIENT)
Dept: ANTEPARTUM | Facility: CLINIC | Age: 32
End: 2022-05-20
Payer: MEDICAID

## 2022-05-20 ENCOUNTER — EMERGENCY (EMERGENCY)
Facility: HOSPITAL | Age: 32
LOS: 0 days | Discharge: HOME | End: 2022-05-20
Admitting: STUDENT IN AN ORGANIZED HEALTH CARE EDUCATION/TRAINING PROGRAM
Payer: MEDICAID

## 2022-05-20 ENCOUNTER — APPOINTMENT (OUTPATIENT)
Dept: OBGYN | Facility: CLINIC | Age: 32
End: 2022-05-20
Payer: MEDICAID

## 2022-05-20 ENCOUNTER — EMERGENCY (EMERGENCY)
Facility: HOSPITAL | Age: 32
LOS: 0 days | Discharge: HOME | End: 2022-05-21
Attending: STUDENT IN AN ORGANIZED HEALTH CARE EDUCATION/TRAINING PROGRAM | Admitting: STUDENT IN AN ORGANIZED HEALTH CARE EDUCATION/TRAINING PROGRAM
Payer: MEDICAID

## 2022-05-20 VITALS
TEMPERATURE: 100 F | HEART RATE: 62 BPM | HEIGHT: 62 IN | OXYGEN SATURATION: 100 % | WEIGHT: 227.08 LBS | DIASTOLIC BLOOD PRESSURE: 73 MMHG | RESPIRATION RATE: 18 BRPM | SYSTOLIC BLOOD PRESSURE: 118 MMHG

## 2022-05-20 VITALS
SYSTOLIC BLOOD PRESSURE: 132 MMHG | RESPIRATION RATE: 18 BRPM | HEART RATE: 85 BPM | OXYGEN SATURATION: 99 % | TEMPERATURE: 98 F | WEIGHT: 227.08 LBS | HEIGHT: 62 IN | DIASTOLIC BLOOD PRESSURE: 70 MMHG

## 2022-05-20 VITALS
HEIGHT: 62 IN | BODY MASS INDEX: 41.77 KG/M2 | WEIGHT: 227 LBS | SYSTOLIC BLOOD PRESSURE: 110 MMHG | DIASTOLIC BLOOD PRESSURE: 70 MMHG

## 2022-05-20 DIAGNOSIS — Z98.890 OTHER SPECIFIED POSTPROCEDURAL STATES: Chronic | ICD-10-CM

## 2022-05-20 DIAGNOSIS — R10.9 UNSPECIFIED ABDOMINAL PAIN: ICD-10-CM

## 2022-05-20 DIAGNOSIS — Z88.8 ALLERGY STATUS TO OTHER DRUGS, MEDICAMENTS AND BIOLOGICAL SUBSTANCES: ICD-10-CM

## 2022-05-20 DIAGNOSIS — O00.90 UNSPECIFIED ECTOPIC PREGNANCY WITHOUT INTRAUTERINE PREGNANCY: ICD-10-CM

## 2022-05-20 DIAGNOSIS — Z3A.00 WEEKS OF GESTATION OF PREGNANCY NOT SPECIFIED: ICD-10-CM

## 2022-05-20 DIAGNOSIS — O99.891 OTHER SPECIFIED DISEASES AND CONDITIONS COMPLICATING PREGNANCY: ICD-10-CM

## 2022-05-20 LAB
ALBUMIN SERPL ELPH-MCNC: 4.4 G/DL — SIGNIFICANT CHANGE UP (ref 3.5–5.2)
ALP SERPL-CCNC: 101 U/L — SIGNIFICANT CHANGE UP (ref 30–115)
ALT FLD-CCNC: 8 U/L — SIGNIFICANT CHANGE UP (ref 0–41)
ANION GAP SERPL CALC-SCNC: 13 MMOL/L — SIGNIFICANT CHANGE UP (ref 7–14)
APTT BLD: 34.3 SEC — SIGNIFICANT CHANGE UP (ref 27–39.2)
AST SERPL-CCNC: 11 U/L — SIGNIFICANT CHANGE UP (ref 0–41)
BASOPHILS # BLD AUTO: 0.05 K/UL — SIGNIFICANT CHANGE UP (ref 0–0.2)
BASOPHILS NFR BLD AUTO: 0.6 % — SIGNIFICANT CHANGE UP (ref 0–1)
BILIRUB SERPL-MCNC: 0.3 MG/DL — SIGNIFICANT CHANGE UP (ref 0.2–1.2)
BUN SERPL-MCNC: 14 MG/DL — SIGNIFICANT CHANGE UP (ref 10–20)
CALCIUM SERPL-MCNC: 9.7 MG/DL — SIGNIFICANT CHANGE UP (ref 8.5–10.1)
CHLORIDE SERPL-SCNC: 104 MMOL/L — SIGNIFICANT CHANGE UP (ref 98–110)
CO2 SERPL-SCNC: 21 MMOL/L — SIGNIFICANT CHANGE UP (ref 17–32)
CREAT SERPL-MCNC: 0.8 MG/DL — SIGNIFICANT CHANGE UP (ref 0.7–1.5)
EGFR: 99 ML/MIN/1.73M2 — SIGNIFICANT CHANGE UP
EOSINOPHIL # BLD AUTO: 0.14 K/UL — SIGNIFICANT CHANGE UP (ref 0–0.7)
EOSINOPHIL NFR BLD AUTO: 1.8 % — SIGNIFICANT CHANGE UP (ref 0–8)
GLUCOSE SERPL-MCNC: 85 MG/DL — SIGNIFICANT CHANGE UP (ref 70–99)
HCG SERPL-ACNC: 770.4 MIU/ML — HIGH
HCT VFR BLD CALC: 33 % — LOW (ref 37–47)
HGB BLD-MCNC: 10.5 G/DL — LOW (ref 12–16)
IMM GRANULOCYTES NFR BLD AUTO: 0.5 % — HIGH (ref 0.1–0.3)
INR BLD: 1.03 RATIO — SIGNIFICANT CHANGE UP (ref 0.65–1.3)
LYMPHOCYTES # BLD AUTO: 1.91 K/UL — SIGNIFICANT CHANGE UP (ref 1.2–3.4)
LYMPHOCYTES # BLD AUTO: 24.5 % — SIGNIFICANT CHANGE UP (ref 20.5–51.1)
MCHC RBC-ENTMCNC: 26.1 PG — LOW (ref 27–31)
MCHC RBC-ENTMCNC: 31.8 G/DL — LOW (ref 32–37)
MCV RBC AUTO: 81.9 FL — SIGNIFICANT CHANGE UP (ref 81–99)
MONOCYTES # BLD AUTO: 0.44 K/UL — SIGNIFICANT CHANGE UP (ref 0.1–0.6)
MONOCYTES NFR BLD AUTO: 5.6 % — SIGNIFICANT CHANGE UP (ref 1.7–9.3)
NEUTROPHILS # BLD AUTO: 5.23 K/UL — SIGNIFICANT CHANGE UP (ref 1.4–6.5)
NEUTROPHILS NFR BLD AUTO: 67 % — SIGNIFICANT CHANGE UP (ref 42.2–75.2)
NRBC # BLD: 0 /100 WBCS — SIGNIFICANT CHANGE UP (ref 0–0)
PLATELET # BLD AUTO: 325 K/UL — SIGNIFICANT CHANGE UP (ref 130–400)
POTASSIUM SERPL-MCNC: 4 MMOL/L — SIGNIFICANT CHANGE UP (ref 3.5–5)
POTASSIUM SERPL-SCNC: 4 MMOL/L — SIGNIFICANT CHANGE UP (ref 3.5–5)
PROT SERPL-MCNC: 7.1 G/DL — SIGNIFICANT CHANGE UP (ref 6–8)
PROTHROM AB SERPL-ACNC: 11.8 SEC — SIGNIFICANT CHANGE UP (ref 9.95–12.87)
RBC # BLD: 4.03 M/UL — LOW (ref 4.2–5.4)
RBC # FLD: 16.2 % — HIGH (ref 11.5–14.5)
SODIUM SERPL-SCNC: 138 MMOL/L — SIGNIFICANT CHANGE UP (ref 135–146)
WBC # BLD: 7.81 K/UL — SIGNIFICANT CHANGE UP (ref 4.8–10.8)
WBC # FLD AUTO: 7.81 K/UL — SIGNIFICANT CHANGE UP (ref 4.8–10.8)

## 2022-05-20 PROCEDURE — 99215 OFFICE O/P EST HI 40 MIN: CPT

## 2022-05-20 PROCEDURE — 99214 OFFICE O/P EST MOD 30 MIN: CPT | Mod: 25

## 2022-05-20 PROCEDURE — 58301 REMOVE INTRAUTERINE DEVICE: CPT

## 2022-05-20 PROCEDURE — 76856 US EXAM PELVIC COMPLETE: CPT | Mod: 26

## 2022-05-20 PROCEDURE — 76830 TRANSVAGINAL US NON-OB: CPT | Mod: 26

## 2022-05-20 PROCEDURE — 99284 EMERGENCY DEPT VISIT MOD MDM: CPT

## 2022-05-20 PROCEDURE — L9991: CPT

## 2022-05-20 NOTE — ED ADULT NURSE NOTE - OBJECTIVE STATEMENT
Pt came to ED to get methotrexate fro ectopic pregnancy.  Pt is a/o/4, c/o vaginal spotting. Pt refused IV line insertion.

## 2022-05-20 NOTE — CONSULT NOTE ADULT - ATTENDING COMMENTS
Patient followed on beta list - pregnancy with IUD in situ, IUD removed today in clinic and imaging done by M team with right adnexal mass suspicious for ectopic pregnancy.  Hcg today is decreasing already. Discussed observation vs medical treatment with MTX. Patient agrees to MTX treatment. Risks, side effects discussed. Dose weight base calculated and given IM. Will follow day 4 and 7.

## 2022-05-20 NOTE — ED ADULT NURSE NOTE - NSFALLRSKASSESSTYPE_ED_ALL_ED
Patient returned call, unable to connect. Please try again.     Telephone Information:   Mobile [phone]   Mobile Not on file.      Initial (On Arrival)

## 2022-05-20 NOTE — CONSULT NOTE ADULT - ASSESSMENT
33yo  LMP ~3-4w ago with abnormally rising betas and ultrasound highly suspicious for ectopic pregnancy, currently hemodynamically and clinically stable.    INCOMPLETE NOTE: recs pendings labs 33yo  LMP ~3-4w ago with abnormally rising betas and ultrasound highly suspicious for ectopic pregnancy, currently hemodynamically and clinically stable.    -recommend 100.9mg IM methotrexate, consent signed in chart  -tylenol/motrin for pain  -pain and bleeding precautions given  -scripts to repeat bhcg level Day 4 () and Day 7 () sent through all scripts  -dispo per ED     Dr. Valenzuela aware. Dr. Gutierrez at bedside 31yo  LMP ~3-4w ago pregnancy with intrauterine device in situ, s/p removal and ultrasound suspicious for right ectopic pregnancy, currently hemodynamically and clinically stable.    -recommend 100.9mg IM methotrexate, consent signed in chart  -tylenol/motrin for pain  -pain and bleeding precautions given  -scripts to repeat bhcg level Day 4 () and Day 7 () sent through all scripts  -dispo per ED     Dr. Valenzuela aware. Dr. Gutierrez at bedside

## 2022-05-21 LAB — BLD GP AB SCN SERPL QL: SIGNIFICANT CHANGE UP

## 2022-05-21 PROCEDURE — 99214 OFFICE O/P EST MOD 30 MIN: CPT

## 2022-05-21 RX ORDER — METHOTREXATE 2.5 MG/1
100.9 TABLET ORAL ONCE
Refills: 0 | Status: COMPLETED | OUTPATIENT
Start: 2022-05-21 | End: 2022-05-21

## 2022-05-21 RX ADMIN — METHOTREXATE 100.9 MILLIGRAM(S): 2.5 TABLET ORAL at 01:48

## 2022-05-21 NOTE — ED PROVIDER NOTE - PATIENT PORTAL LINK FT
You can access the FollowMyHealth Patient Portal offered by [facility] by registering at the following website: [email or web address]. By joining [facility]’s FollowMyHealth portal, you will also be able to view your health information using other applications (apps) compatible with our system.

## 2022-05-21 NOTE — ED PROVIDER NOTE - OBJECTIVE STATEMENT
31 yo female LMP ~3-4w ago with abnormally rising beta hcg values and ultrasound highly suspicious for ectopic pregnancy. pt was seen at obgyn clinic today and sent in for methotrexate. pt w/ mild abdominal pain, no bleeding. no fevers/chills. 31 yo female LMP ~3-4 weeks ago with abnormally rising beta hcg values and ultrasound highly suspicious for ectopic pregnancy. pt was seen at obgyn clinic today and sent in for methotrexate. pt w/ mild abdominal pain, no bleeding. no fevers/chills. 33 yo female LMP ~3-4 weeks ago with abnormally rising beta hcg values and ultrasound highly suspicious for ectopic pregnancy. pt was seen at obgyn clinic today and sent in for methotrexate. pt w/ mild abdominal pain, no vaginal bleeding. no fevers/chills.

## 2022-05-21 NOTE — ED PROVIDER NOTE - PHYSICAL EXAMINATION
CONSTITUTIONAL: Well-developed; well-nourished; in no acute distress. Speaking in full sentences.  SKIN: warm, dry. no rashes.  HEAD: Normocephalic; atraumatic.  EYES: PERRLA, EOMI, no conjunctival erythema.  ENT: No nasal discharge; airway clear. mmm.  NECK: Supple; non tender.  CARD: S1, S2 normal; no murmurs, gallops, or rubs. Regular rate and rhythm.   RESP: No wheezes, rales, or rhonchi. lungs ctab.  ABD: soft ntnd; no masses, rebound, or guarding. No CVAT b/l.  EXT: Normal ROM. No clubbing, cyanosis, or edema. No calf ttp.  NEURO: Alert, oriented, grossly unremarkable. no focal neuro. deficits. ambulating with a steady gait.  PSYCH: Cooperative, appropriate.

## 2022-05-21 NOTE — ED PROVIDER NOTE - NSFOLLOWUPINSTRUCTIONS_ED_ALL_ED_FT
-Continue to take tylenol and motrin for pain.  -Please return to the ED if heavy bleeding occurs.  -Return to OBGYN clinic on Day 4 (5/24) and Day 7 (5/27).              Ectopic Pregnancy    WHAT YOU NEED TO KNOW:    Ectopic pregnancy occurs when a fertilized egg attaches and begins to grow outside of the uterus. The most common place for this to happen is in the fallopian tube. This is sometimes called a tubal pregnancy. The egg can also implant on the outside of the uterus, on the ovary or cervix, or in the abdomen. The egg may begin to grow, but the pregnancy cannot continue normally. Ectopic pregnancy can cause heavy bleeding and may be life-threatening.Ectopic Pregnancy         DISCHARGE INSTRUCTIONS:    Call your local emergency number (911 in the [place]) if:     You have chest pain or trouble breathing.        Call your doctor if:     You have sharp pain in your lower abdomen that is severe and starts suddenly.      You feel lightheaded or like you are going to faint.      You have increasing abdominal or pelvic pain or heavy vaginal bleeding.      You have shoulder pain.      You have a fever.      You have questions or concerns about your condition or care.    Medicines: You may need any of the following:     Prescription pain medicine may be given. Ask your healthcare provider how to take this medicine safely. Some prescription pain medicines contain acetaminophen. Do not take other medicines that contain acetaminophen without talking to your healthcare provider. Too much acetaminophen may cause liver damage. Prescription pain medicine may cause constipation. Ask your healthcare provider how to prevent or treat constipation.       Acetaminophen decreases pain and fever. It is available without a doctor's order. Ask how much to take and how often to take it. Follow directions. Read the labels of all other medicines you are using to see if they also contain acetaminophen, or ask your doctor or pharmacist. Acetaminophen can cause liver damage if not taken correctly. Do not use more than 4 grams (4,000 milligrams) total of acetaminophen in one day.       Take your medicine as directed. Contact your healthcare provider if you think your medicine is not helping or if you have side effects. Tell him or her if you are allergic to any medicine. Keep a list of the medicines, vitamins, and herbs you take. Include the amounts, and when and why you take them. Bring the list or the pill bottles to follow-up visits. Carry your medicine list with you in case of an emergency.    If you received methotrexate:     Do not have sex, and limit physical activity. Heavy physical activity or sexual intercourse may cause the ectopic pregnancy to rupture. This can be life-threatening. Your healthcare provider will tell you when it is okay to have sex and return to your normal activities.      Do not get pregnant until your healthcare provider says it is okay. Methotrexate will be harmful to an unborn baby. You will need to wait until at least 1 monthly cycle after you finish the methotrexate course to get pregnant. Your provider may want you to wait until after you have had 3 monthly cycles. This will help make sure all the methotrexate is out of your body.      Avoid folic acid and NSAID medicines. Folic acid, and NSAIDs, such as ibuprofen, prevent methotrexate from working correctly. Do not take folic acid supplements or have foods that are high in folic acid. Examples include orange juice, breakfast cereal, and leafy green vegetables. Your healthcare provider can give you more information on foods to avoid.      You may have some spotting and abdominal pain. This may start a few days after you begin taking methotrexate. These are normal and should only last a short time. Talk to your healthcare provider if you have any concerns.      Limit sunlight exposure. Sunlight can cause a condition caused methotrexate dermatitis (skin irritation).    For support and more information:     The [employer]  [address] [address]  [place] [address]  Phone: [phone]  Phone: [phone]  Web Address: http://www.acog.org      Follow up with your gynecologist as directed: You may need to return for a follow-up exam, treatment, or blood tests. If you received methotrexate to stop your pregnancy, it is important to come in for follow-up tests. Write down your questions so you remember to ask them during your visits.

## 2022-05-21 NOTE — ED PROVIDER NOTE - NSFOLLOWUPCLINICS_GEN_ALL_ED_FT
[facility]  OB/GYN  [address] [place] [address]  Phone: [phone]  Fax:   Follow Up Time: 1-3 Days

## 2022-05-21 NOTE — ED PROVIDER NOTE - ATTENDING CONTRIBUTION TO CARE
31yo  LMP ~3-4w ago with abnormally rising betas and ultrasound highly suspicious for ectopic pregnancy  pt was seen at obgyn clinic today and sent in for MTX. pt w/ mild abd pain, no bleeding. no fevers/chills.    vss  gen- NAD, aaox3  card-rrr  lungs-ctab, no wheezing or rhonchi  abd-sntnd, no guarding or rebound  neuro- full str/sensation, cn ii-xii grossly intact, normal coordination    d/w gyn- rec interval labs, MTX, ed obs period

## 2022-05-24 NOTE — CHART NOTE - NSCHARTNOTEFT_GEN_A_CORE
PGY1 Note    Patient called and informed of results of hCG, lab found to have dropped by 13%. Emphasized importance of repeating lab on 5/27 to assess appropriate response to MTX therapy, patient voiced an understanding. Reinforced ectopic precautions, all questions answered. Patient feeling well, endorsing mild abdominal pain and diarrhea with some vaginal spotting.     Dr. Myles and Dr. Hernandez to be aware

## 2022-05-25 LAB — HCG SERPL-MCNC: 667.3 MIU/ML

## 2022-05-27 NOTE — CHART NOTE - NSCHARTNOTEFT_GEN_A_CORE
PGY1 NOTE    Attempted to contact patient to remind to repeat HCG today. No answer, voicemailbox is full. Attempted to call emergency contact, call declined and unable to reach. Will attempt again tomorrow.     Dr. Myles and Dr. Hernandez to be aware

## 2022-05-28 NOTE — CHART NOTE - NSCHARTNOTEFT_GEN_A_CORE
PGY1 Note    Attempted to contact patient to repeat hCG, unable to reach and voicemail boxwas full.     Attempted to call patient's emergency contact,  #[phone] used, voicemail left [facility] instructions for patient to call back, call back information provided.     Dr. Bravo and Dr. Layc to be aware

## 2022-05-29 NOTE — CHART NOTE - NSCHARTNOTEFT_GEN_A_CORE
PGY1 Note    Patient called to inquire whether she repeated her blood work like she had said on the phone yesterday. Patient reports she was unable to because the lab was closed. As patient is s/p methotrexate and [facility] labs are closed, patient was encouraged to come to the ED today to repeat her blood work. Patient refused to come to the ED today or tomorrow, reporting she only desires to go to the lab which is not open until 5/31. Patient denies abdominal pain, chest pain, shortness of breath, lightheadedness, doziness. Reports minimal vaginal bleeding. Ectopic precautions reviewed.     Dr. Bowman and Dr. Holt aware

## 2022-05-30 NOTE — CHART NOTE - NSCHARTNOTEFT_GEN_A_CORE
PGY 1 Note    Attempted to contact patient to remind her to repeat hCG 5/31 at [facility]. No answer, voicemail box is full. Will re-attempt contact tomorrow    Dr. Flynn and Dr. Fernandez to be aware

## 2022-05-31 NOTE — CHART NOTE - NSCHARTNOTEFT_GEN_A_CORE
PGY1 Note    Attempted to contact patient, no answer, VM box is full. Emergency contact called using  #[phone] Jeromy ([place]),  was left with contact information and request for patient to call back to repeat blood work    Dr. Bowman and Dr. Hernandez to be aware PGY1 Note    Attempted to contact patient, no answer, VM box is full. Emergency contact called using  #[phone] Jeromy ([place]),  was left with contact information and request for patient to call back to repeat blood work    Certified letter sent today    Dr. Bowman and Dr. Hernandez to be aware

## 2022-06-01 NOTE — CHART NOTE - NSCHARTNOTEFT_GEN_A_CORE
PGY1 Note    Patient called and picked up. Strongly recommended patient repeat hCG as soon as possible, reinforced ectopic precautions. Patient voiced an understanding. States she will go to the lab today.    Dr. Bowman and Dr. Hernandez to be aware

## 2022-06-02 NOTE — CHART NOTE - NSCHARTNOTEFT_GEN_A_CORE
OBGYN PGY2 Note    Attempted to call patient to repeat bhcg as she stated she would go to lab yesterday but no encounter was noted. Patient did not answer and voicemail box was full so could not leave message. Attempted to reach emergency contact but did not  and unable to leave voicemail message. GYN team to call back again.     and  to be made aware

## 2022-06-03 LAB — HCG SERPL-MCNC: 236.6 MIU/ML

## 2022-06-04 NOTE — CHART NOTE - NSCHARTNOTEFT_GEN_A_CORE
PGY2 Note    Attempted to call patient to inform her of results and need for repeat, unable to contact, voicemail box full. Emergency contact voicemailbox also full. Secure message sent through [employer]  with call back information.    Dr. Hernandez and Dr. Flynn to be aware

## 2022-06-05 NOTE — CHART NOTE - NSCHARTNOTEFT_GEN_A_CORE
OBGYN PGY2 Note    Attempted to contact patient about bhcg result and to repeat bhcg. Patient did not , VM box full so message was not able to be left. Will call again.    Service attending and  to be made aware

## 2022-06-06 NOTE — CHART NOTE - NSCHARTNOTEFT_GEN_A_CORE
PGY2 Note    Attempted to call patient and inform her of hCG results and need for repeat on 6/9. Patient did not  and voicemailbox was full. Emergency contact also attempted, line not in service.     Dr. Bowman and Dr. Hernandez to be aware

## 2022-06-09 LAB — HCG SERPL-MCNC: 129.7 MIU/ML

## 2022-06-09 NOTE — CHART NOTE - NSCHARTNOTEFT_GEN_A_CORE
PGY2 Note    Patient called to remind to repeat hCG today, unable to contact, voicemail box is full.     Dr. Hernandez and Dr. Bowman to be aware

## 2022-06-10 DIAGNOSIS — O00.90 UNSPECIFIED. ECTOPIC. PREGNANCY WITHOUT INTRAUTERINE PREGNANCY: ICD-10-CM

## 2022-06-10 NOTE — CHART NOTE - NSCHARTNOTEFT_GEN_A_CORE
PGY2 Note    Patient called to give results of hCG and to advise repeat in one week (6/16). Patient did not answer and voicemail box is full. Secure message sent through [employer].     Dr. Hernandez and Dr. Bowman to be aware

## 2022-06-16 NOTE — CHART NOTE - NSCHARTNOTEFT_GEN_A_CORE
PGY2 Note    Called to remind patient to repeat hCG today, no answer and voicemail box is full. Confidential text sent via [employer] .     Dr. Hernandez and Dr. Bowman to be aware

## 2022-06-17 NOTE — CHART NOTE - NSCHARTNOTEFT_GEN_A_CORE
PGY2 Note    Called to remind patient to repeat hCG, no answer and voicemail box is full. Confidential text sent via [employer] .     Dr. Hernandez and Dr. Chow to be aware.

## 2022-06-19 NOTE — CHART NOTE - NSCHARTNOTEFT_GEN_A_CORE
PGY 2 Note    attempted to reach patient to inform of bhcg level result. Unable to reach patient and voicemail box full.    Will reattempt tomorrow.    Dr. Hernandez to be made aware.

## 2022-06-20 LAB — HCG SERPL-MCNC: 32.1 MIU/ML

## 2022-06-20 NOTE — CHART NOTE - NSCHARTNOTEFT_GEN_A_CORE
PGY 2 Note    Attempted to reach patient to inform her about the bhcg levels. Unable to reach patient and unable to leave voicemail.  Will reattempt tomorrow.    Dr. Hernandez to be made aware.

## 2022-06-24 NOTE — CHART NOTE - NSCHARTNOTEFT_GEN_A_CORE
PGY 2 Note    attempted to reach patient today to remind her about repeat bHCG today. Unable to reach patient and VM full. Will call tomorrow.    Dr. Hernandez to be made aware

## 2022-06-25 NOTE — CHART NOTE - NSCHARTNOTEFT_GEN_A_CORE
PGY2 Note    Attempted to call patient for reminder to complete beta-hcg and follow up on symptoms. Unable to reach patient, unable to leave voicemail.     Dr Bomwan and Dr Hernandez to be aware

## 2022-06-26 NOTE — CHART NOTE - NSCHARTNOTEFT_GEN_A_CORE
PGY 2 Note    Called patient called to remind her to repeat blood work. Emphasized the importance of repeat blood work. Patient doing well. Denies abdominal pain, vaginal bleeding, lightheadedness, dizziness.     Dr Valenzuela and Dr Hernandez to be made aware.

## 2022-06-27 NOTE — CHART NOTE - NSCHARTNOTEFT_GEN_A_CORE
PGY 2 Note    Attempted to reach patient regarding reminder to go for bhcg level. Unable to reach patient at this time, and unable to leave voicemail.    Dr. Hernandez to be made aware.

## 2022-06-28 NOTE — CHART NOTE - NSCHARTNOTEFT_GEN_A_CORE
PGY 2 Note    Spoke with patient. patient states she is still having unprotected intercourse despite GYN teams multiple attempts to explain the risks of doing so after receiving methotrexate. I once again explained to the patient that methotrexate is teratogenic and also that it is important for us when following her levels that a new pregnancy not result. Patient once again voiced understanding. She states she will go for her bhcg level today.

## 2022-06-29 LAB — HCG SERPL-MCNC: 2.4 MIU/ML

## 2022-06-29 NOTE — CHART NOTE - NSCHARTNOTEFT_GEN_A_CORE
PGY3 Note    HPI: 33yo  LMP ~3-4w ago, here with positive home pregnancy test and light vaginal spotting. Unplanned but desired pregnancy.  Risk Factors: Paraguard IUD in-situ at conception    DDx: ectopic pregnancy vs early IUP    Labs:  : 6.03>10.9/34.5<288, 136/4.2/106/19/8/0.6<83, AST/ALT: , O pos, ab screen: neg, bhc.3   hcg 950   hcg (in ED) 1093   hcg 1677 (61% increase)   7.81>10.5/33.0<325; 138/4.0/104/21/14/0.8<85; AST/ALT ; hCG 770.4 (54% decrease)   667.3  (day 3)    bhcg 236.6 (64.5% decrease) (day 13)     hcg 129 (45.3% decrease)   hcg 32.1   hcg 2.4    Imagin/13 TAUS & TVUS: Uterus: 9.4 cm x 5.8 cm x 6.4 cm. Endometrium: An IUD is present. There is no definite evidence of gestational sac on this examination. However, a small gestational sac may be obscured by IUD artifact. Right ovary: 4.1 cm x 2.5 cm x 2.5 cm. There are 2 complex right ovarian cysts measuring 1.6 cm and 1.9 cm, likely representing hemorrhagic cysts. Adjacent to the right ovary is a 0.5 x 0.7 x 0.5 cm thick-walled cystic structure. A right ectopic pregnancy cannot be excluded on this examination. Left ovary: 2.2 cm x 1.9 cm x 1.5 cm. Within normal limits.     : Uterus: 9.8 cm x 5.3 cm x 5.4 cm. Within normal limits. Endometrium: There is an intrauterine device. No definite gestational sac is seen. Right ovary: 3.7 cm x 2.6 cm x 2.3 cm. Within the right ovary, there is a complex probable corpus luteal cyst measuring 2 x 1.5 x 1.7 cm. Left ovary: 2.2 cm x 1.1 cm x 1.6 cm. within normal limits. Normal Doppler signal is noted. Fluid: None. IMPRESSION: No definite gestational sac. Please correlate with serial hCG and follow-up ultrasound. IUD is noted within the endometrial cavity    : There is a likely corpus luteum cyst in the right ovary measuring 2.2x1.8x1.9cm. There is a separate right sided adnexal mass measuring 1.7x1.9x1.4cm. There is no gestational sac or fetal pole noted. THere is an intrauterine IUD noted. Superior to the IUD there appears to be fluid and a possible blood clot. THere is free fluid in the cul-de-sac    Patient was seen for initial consult on . She had a paragard IUD in place with bhcg of 680.3. Counseled on high risk of ectopic pregnancy. Patient initially desired conservative management with repeat bhcg. Subsequent bhcg results showed inappropriate rise with findings on TVUS suspicious for ectopic pregnancy. She opted for medical management with methotrexate. Methotrexate 100mg given on . Patient was followed with repeat bhcg. Bhcg on  was 2.4 with resolution of ectopic pregnancy. Patient to follow-up for routine GYN care. GYN no longer following on beta list.    Dr. Hernandez aware

## 2022-06-29 NOTE — CHART NOTE - NSCHARTNOTESELECT_GEN_ALL_CORE
Event Note
Off Service Note
Event Note

## 2022-06-29 NOTE — CHART NOTE - NSCHARTNOTEFT_GEN_A_CORE
PGY2 Note    Patient called, reports she went to lab for blood work yesterday. Currently asymptomatic. Contact made with laboratory which confirmed presence of blood sample, results to return later today per lab.    Dr. Bowman and Dr. Hernandez to be aware

## 2022-07-05 ENCOUNTER — APPOINTMENT (OUTPATIENT)
Dept: OBGYN | Facility: CLINIC | Age: 32
End: 2022-07-05

## 2022-08-11 NOTE — ED BEHAVIORAL HEALTH ASSESSMENT NOTE - NS ED BHA MED ROS ALLERGIC IMMUNOLOGIC
There are no preventive care reminders to display for this patient.    Patient is up to date, no discussion needed.   No complaints

## 2022-08-12 ENCOUNTER — APPOINTMENT (OUTPATIENT)
Dept: OBGYN | Facility: CLINIC | Age: 32
End: 2022-08-12

## 2022-09-29 ENCOUNTER — LABORATORY RESULT (OUTPATIENT)
Age: 32
End: 2022-09-29

## 2022-09-30 ENCOUNTER — OUTPATIENT (OUTPATIENT)
Dept: OUTPATIENT SERVICES | Facility: HOSPITAL | Age: 32
LOS: 1 days | Discharge: HOME | End: 2022-09-30

## 2022-09-30 ENCOUNTER — RESULT CHARGE (OUTPATIENT)
Age: 32
End: 2022-09-30

## 2022-09-30 ENCOUNTER — APPOINTMENT (OUTPATIENT)
Dept: OBGYN | Facility: CLINIC | Age: 32
End: 2022-09-30

## 2022-09-30 VITALS — SYSTOLIC BLOOD PRESSURE: 104 MMHG | BODY MASS INDEX: 38.78 KG/M2 | WEIGHT: 212 LBS | DIASTOLIC BLOOD PRESSURE: 72 MMHG

## 2022-09-30 DIAGNOSIS — Z34.90 ENCOUNTER FOR SUPERVISION OF NORMAL PREGNANCY, UNSPECIFIED, UNSPECIFIED TRIMESTER: ICD-10-CM

## 2022-09-30 DIAGNOSIS — Z98.890 OTHER SPECIFIED POSTPROCEDURAL STATES: Chronic | ICD-10-CM

## 2022-09-30 LAB — HCG UR QL: POSITIVE

## 2022-09-30 PROCEDURE — 76817 TRANSVAGINAL US OBSTETRIC: CPT | Mod: 26

## 2022-09-30 PROCEDURE — 99214 OFFICE O/P EST MOD 30 MIN: CPT | Mod: 25

## 2022-09-30 NOTE — HISTORY OF PRESENT ILLNESS
[FreeTextEntry1] : 33 y/o , LMP unknown, presents for positive pregnancy test at home. Patient with early pregnancy signs of nausea and vomiting, which are usually present for all her pregnancies. She was treated with methotrexate for ectopic pregnancy in 2022. Patient had a Paragard IUD at the time. Currently denies abdominal pain or vaginal spotting. Former smoker. Stopped smoking tobacco 2 months ago. Currently uses marijuana. \par \par OB History:\par \par G1: FT  x1, 4 month infant death (patient did not desire to offer further details)\par G2: FT  x1\par G3: FT LTCS x1, elective for suspected macrosomia\par G4: IUFD @5 months (per patient), s/p D&E\par G5: FT LTCS x1, elective repeat\par G6: 32w LTCS for twin pregnancy, complicated by pre-eclampsia\par G7: ectopic pregnancy on 2022 s/p mtx treatment\par G8 current\par

## 2022-09-30 NOTE — DISCUSSION/SUMMARY
[FreeTextEntry1] : 31 y/o  at 6w5d based on today's sonogram, VAISHALI 23, with early pregnancy\par - f/u first trimester labs\par - referral official sono OB for correct dating\par - prenatal vitamins given\par - baseline pre-eclamptic labs sent\par - f/u pap and GC/CT panel\par - RTC in 4week for ob visit

## 2022-10-19 ENCOUNTER — APPOINTMENT (OUTPATIENT)
Dept: ANTEPARTUM | Facility: CLINIC | Age: 32
End: 2022-10-19

## 2022-10-28 ENCOUNTER — APPOINTMENT (OUTPATIENT)
Dept: OBGYN | Facility: CLINIC | Age: 32
End: 2022-10-28

## 2022-11-21 LAB
A VAGINAE DNA VAG QL NAA+PROBE: ABNORMAL
BVAB2 DNA VAG QL NAA+PROBE: ABNORMAL
C KRUSEI DNA VAG QL NAA+PROBE: NEGATIVE
C TRACH RRNA SPEC QL NAA+PROBE: NEGATIVE
CYTOLOGY CVX/VAG DOC THIN PREP: ABNORMAL
HPV HIGH+LOW RISK DNA PNL CVX: DETECTED
MEGA1 DNA VAG QL NAA+PROBE: ABNORMAL
N GONORRHOEA RRNA SPEC QL NAA+PROBE: NEGATIVE
T VAGINALIS RRNA SPEC QL NAA+PROBE: NEGATIVE

## 2022-11-23 ENCOUNTER — APPOINTMENT (OUTPATIENT)
Dept: OBGYN | Facility: CLINIC | Age: 32
End: 2022-11-23

## 2022-12-22 ENCOUNTER — RESULT CHARGE (OUTPATIENT)
Age: 32
End: 2022-12-22

## 2022-12-22 ENCOUNTER — NON-APPOINTMENT (OUTPATIENT)
Age: 32
End: 2022-12-22

## 2022-12-22 ENCOUNTER — OUTPATIENT (OUTPATIENT)
Dept: OUTPATIENT SERVICES | Facility: HOSPITAL | Age: 32
LOS: 1 days | Discharge: HOME | End: 2022-12-22

## 2022-12-22 ENCOUNTER — APPOINTMENT (OUTPATIENT)
Dept: OBGYN | Facility: CLINIC | Age: 32
End: 2022-12-22

## 2022-12-22 VITALS
WEIGHT: 212 LBS | HEIGHT: 62 IN | DIASTOLIC BLOOD PRESSURE: 85 MMHG | BODY MASS INDEX: 39.01 KG/M2 | SYSTOLIC BLOOD PRESSURE: 132 MMHG

## 2022-12-22 DIAGNOSIS — Z34.90 ENCOUNTER FOR SUPERVISION OF NORMAL PREGNANCY, UNSPECIFIED, UNSPECIFIED TRIMESTER: ICD-10-CM

## 2022-12-22 DIAGNOSIS — Z98.890 OTHER SPECIFIED POSTPROCEDURAL STATES: Chronic | ICD-10-CM

## 2022-12-22 PROCEDURE — 99214 OFFICE O/P EST MOD 30 MIN: CPT

## 2022-12-22 RX ORDER — METRONIDAZOLE 500 MG/1
500 TABLET ORAL
Qty: 28 | Refills: 0 | Status: ACTIVE | COMMUNITY
Start: 2022-12-22 | End: 1900-01-01

## 2022-12-22 RX ORDER — BLOOD-GLUCOSE METER
KIT MISCELLANEOUS
Qty: 1 | Refills: 0 | Status: ACTIVE | COMMUNITY
Start: 2022-12-22 | End: 1900-01-01

## 2022-12-27 LAB
BILIRUB UR QL STRIP: NORMAL
CLARITY UR: CLEAR
COLLECTION METHOD: NORMAL
GLUCOSE UR-MCNC: NORMAL
HCG UR QL: 4 EU/DL
HGB UR QL STRIP.AUTO: NORMAL
KETONES UR-MCNC: NORMAL
LEUKOCYTE ESTERASE UR QL STRIP: NORMAL
NITRITE UR QL STRIP: NORMAL
PH UR STRIP: 6
PROT UR STRIP-MCNC: NORMAL
SP GR UR STRIP: 1.02

## 2023-01-03 ENCOUNTER — NON-APPOINTMENT (OUTPATIENT)
Age: 33
End: 2023-01-03

## 2023-01-04 ENCOUNTER — NON-APPOINTMENT (OUTPATIENT)
Age: 33
End: 2023-01-04

## 2023-01-04 ENCOUNTER — APPOINTMENT (OUTPATIENT)
Dept: ANTEPARTUM | Facility: CLINIC | Age: 33
End: 2023-01-04
Payer: MEDICAID

## 2023-01-04 ENCOUNTER — RESULT CHARGE (OUTPATIENT)
Age: 33
End: 2023-01-04

## 2023-01-04 ENCOUNTER — OUTPATIENT (OUTPATIENT)
Dept: OUTPATIENT SERVICES | Facility: HOSPITAL | Age: 33
LOS: 1 days | Discharge: HOME | End: 2023-01-04

## 2023-01-04 VITALS
OXYGEN SATURATION: 100 % | HEART RATE: 79 BPM | SYSTOLIC BLOOD PRESSURE: 126 MMHG | TEMPERATURE: 98.2 F | BODY MASS INDEX: 41.34 KG/M2 | DIASTOLIC BLOOD PRESSURE: 55 MMHG | WEIGHT: 226 LBS

## 2023-01-04 DIAGNOSIS — Z98.890 OTHER SPECIFIED POSTPROCEDURAL STATES: Chronic | ICD-10-CM

## 2023-01-04 LAB
BILIRUB UR QL STRIP: NORMAL
CLARITY UR: CLEAR
COLLECTION METHOD: NORMAL
GLUCOSE UR-MCNC: NORMAL
HCG UR QL: 0.2 EU/DL
HGB UR QL STRIP.AUTO: NORMAL
KETONES UR-MCNC: NORMAL
LEUKOCYTE ESTERASE UR QL STRIP: NORMAL
NITRITE UR QL STRIP: NORMAL
PH UR STRIP: 7.5
PROT UR STRIP-MCNC: NORMAL
SP GR UR STRIP: 1

## 2023-01-04 PROCEDURE — 99214 OFFICE O/P EST MOD 30 MIN: CPT

## 2023-01-04 NOTE — DISCUSSION/SUMMARY
[FreeTextEntry1] : Ms. Mcwilliams is a 31 y/o  at 19w0d, VAISHALI 23, dated by first trimester sonogram, presents for initial MFM consult for cHTN and h/o pre-eclampsia with severe features in her last pregnancy. Co-managed with Dr. Vásquez. Reports good fetal movement. \par \par Ms. Mcwilliams was diagnosed with cHTN many yrs ago, per her. Has never been on any medication. Does not maintain a BP log at home. Last pregnancy was complicated with pre-eclampsia with severe features, received mag for seizure prophylaxis, was sent home with no BP meds. Today she is endorsing lightheadedness with extreme exertion. Has fallen in the past. Last fall was 2 months ago. Drinks one bottle of water per day. Has not seen a cardiologist this pregnancy. Denies headache, vision changes, SOB, chest pain, or RUQ/epigastric pain.\par \par Also endorsing hand numbness, had Carpal Tunnel Syndrome in previous pregnancy, likely experiencing the same. \par \par OB History:  \par G1 2006: FT , complicated with hypertension, 4 month infant death (patient did not desire to offer further details) \par G2 2008: FT , 4025g, complicated with hypertension\par G3 12/3/2010: FT LTCS x1, elective for suspected macrosomia, 4479g, complicated with hypertension\par G4: IUFD @ 16 weeks, s/p D&E ([facility], unknown cause)\par G5 2013: FT LTCS x1, elective repeat, 4252g, complicated with hypertension\par G6: 2049: repeat LTCS @34w6d for twin pregnancy, complicated by pre-eclampsia with severe features (no antihypertensive meds required) \par G7: ectopic pregnancy on 2022 s/p methotrexate treatment, had Paragard IUD in place\par G8: current \par \par GYN: H/o cone biopsy for abnormal pap. Most recent in 2022 pap wnl, HPV +. H/o genital herpes \par Pmhx: cHTN\par Allergies: Nyquil\par Medications: none \par Surgical hx: LEEP, c/s x3, D&E \par Social hx: Lives with her children and partner in a 2 bedroom apartment. Currently working as a  for [employer]. Former smoker. Admits to marijuana use.\par \par Physical exam\par GA: AAOx3 in NAD\par CV: normal s1s2\par Pulm: CTAB\par Neuro: 2+ DTR\par abd: gravid, nontender\par LE: no swelling or calf tenderness\par \par Labs: Has not done prenatal labs \par \par OBUS: \par : CRL 0.77cm= 6w5d \par \par  A/P 31 y/o  at 19w0d, VASIHALI 23, dated by first trimester sonogram, presents for initial MFM consult for cHTN and h/o pre-eclampsia with severe features in her last pregnancy. Co-managed with Dr. Vásuqez. Denies LOF, vaginal bleeding or abdominal cramping \par \par #cHTN\par - Maternal risks and complications of hypertension in pregnancy include preeclampsia, maternal stroke and death. Fetal risks include increased risk for growth restriction, placental abruption, fetal demise and prematurity. Preeclampsia was discussed including the warning signs. The patient is aware of the potential for need to discontinue work at some point, hospitalization, and premature delivery. \par RECOMMENDATIONS:  \par - Home blood pressure monitoring with target range being 130s/70-80s. BP cuff sent to pharmacy\par - Baseline pre-eclampsia labs with a 24hr UTP sent with prenatal labs. Encouraged patient to go for blood work.\par - Cardiology Evaluation- referral given\par - Comprehensive ultrasound at 18-20 weeks followed by serial ultrasound assessments of fetal growth \par -  testing in the third trimester \par - Continue taking Aspirin 81 mg PO daily\par \par #Carpal Tunnel Syndrome\par - Bilateral wrist splints sent to pharmacy. If it does not help then recommend neurology consult.\par \par #Lightheadedness\par - Recommend cardiology evaluation (referral given)\par - Hydration encouraged\par - F/u hgb and hematocrit\par - Check TSH (script given)\par \par #Pregnancy\par - Recommend referral to genetics for AMA and methotrexate exposure\par - H/o genital herpes. Will need Valtrex at 36w.\par - Endorsing lightheadedness, recommend cardiology evaluation and TSH level\par - F/u prenatal labs (including Verifi and MSAFP)\par - Anatomy sonogram scheduled for 2w\par - Continue routine prenatal care (including cervical dysplasia surveillance) with Dr. Vásquez.\par - Will likely deliver via repeat  section (#4)\par \par RTC in 2 weeks with the blood pressure log.\par \par

## 2023-01-04 NOTE — END OF VISIT
[FreeTextEntry3] : [facility] Staff\par \par \par I saw and evaluated Ms. LEWIS with Dr. Stewart.   I modified the note above and agree with its contents in the present form.\par \par Pablo Connolly MD\par \par \par \par \par \par

## 2023-01-06 ENCOUNTER — LABORATORY RESULT (OUTPATIENT)
Age: 33
End: 2023-01-06

## 2023-01-09 DIAGNOSIS — Z3A.19 19 WEEKS GESTATION OF PREGNANCY: ICD-10-CM

## 2023-01-09 DIAGNOSIS — O10.919 UNSPECIFIED PRE-EXISTING HYPERTENSION COMPLICATING PREGNANCY, UNSPECIFIED TRIMESTER: ICD-10-CM

## 2023-01-09 DIAGNOSIS — O99.212 OBESITY COMPLICATING PREGNANCY, SECOND TRIMESTER: ICD-10-CM

## 2023-01-09 DIAGNOSIS — O34.219 MATERNAL CARE FOR UNSPECIFIED TYPE SCAR FROM PREVIOUS CESAREAN DELIVERY: ICD-10-CM

## 2023-01-09 LAB — TSH SERPL-ACNC: 1.77 UIU/ML

## 2023-01-10 LAB
ABO + RH PNL BLD: NORMAL
ALBUMIN SERPL ELPH-MCNC: 4 G/DL
ALP BLD-CCNC: 81 U/L
ALT SERPL-CCNC: <5 U/L
ANION GAP SERPL CALC-SCNC: 14 MMOL/L
AST SERPL-CCNC: 10 U/L
BASOPHILS # BLD AUTO: 0.05 K/UL
BASOPHILS NFR BLD AUTO: 0.5 %
BILIRUB SERPL-MCNC: <0.2 MG/DL
BLD GP AB SCN SERPL QL: NORMAL
BUN SERPL-MCNC: 7 MG/DL
CALCIUM SERPL-MCNC: 8.7 MG/DL
CHLORIDE SERPL-SCNC: 101 MMOL/L
CO2 SERPL-SCNC: 22 MMOL/L
CREAT 24H UR-MCNC: 0.6 G/24 HR
CREAT ?TM UR-MCNC: 112 MG/DL
CREAT SERPL-MCNC: <0.5 MG/DL
EGFR: 126 ML/MIN/1.73M2
EOSINOPHIL # BLD AUTO: 0.13 K/UL
EOSINOPHIL NFR BLD AUTO: 1.4 %
ESTIMATED AVERAGE GLUCOSE: 91 MG/DL
GLUCOSE SERPL-MCNC: 82 MG/DL
HBA1C MFR BLD HPLC: 4.8 %
HBV SURFACE AG SER QL: NONREACTIVE
HCT VFR BLD CALC: 34 %
HCV AB SER QL: NONREACTIVE
HCV S/CO RATIO: 0.09 S/CO
HGB BLD-MCNC: 10.7 G/DL
HIV1+2 AB SPEC QL IA.RAPID: NONREACTIVE
IMM GRANULOCYTES NFR BLD AUTO: 0.4 %
LDH SERPL-CCNC: 136
LEAD BLD-MCNC: <1 UG/DL
LYMPHOCYTES # BLD AUTO: 1.91 K/UL
LYMPHOCYTES NFR BLD AUTO: 19.9 %
MAN DIFF?: NORMAL
MCHC RBC-ENTMCNC: 29.6 PG
MCHC RBC-ENTMCNC: 31.5 G/DL
MCV RBC AUTO: 94.2 FL
MEV IGG FLD QL IA: 57.7 AU/ML
MEV IGG+IGM SER-IMP: POSITIVE
MONOCYTES # BLD AUTO: 0.56 K/UL
MONOCYTES NFR BLD AUTO: 5.8 %
NEUTROPHILS # BLD AUTO: 6.91 K/UL
NEUTROPHILS NFR BLD AUTO: 72 %
PLATELET # BLD AUTO: 245 K/UL
POTASSIUM SERPL-SCNC: 4.1 MMOL/L
PROT 24H UR-MRATE: 11 MG/DL
PROT ?TM UR-MCNC: 24 HR
PROT SERPL-MCNC: 6.3 G/DL
PROT UR-MCNC: 60 MG/24 H
RBC # BLD: 3.61 M/UL
RBC # FLD: 15.9 %
RUBV IGG FLD-ACNC: 1.3 INDEX
RUBV IGG SER-IMP: POSITIVE
SODIUM SERPL-SCNC: 137 MMOL/L
SPECIMEN VOL 24H UR: 550 ML
T PALLIDUM AB SER QL IA: NEGATIVE
URATE SERPL-MCNC: 3 MG/DL
VZV AB TITR SER: POSITIVE
VZV IGG SER IF-ACNC: 579.4 INDEX
WBC # FLD AUTO: 9.6 K/UL

## 2023-01-12 ENCOUNTER — NON-APPOINTMENT (OUTPATIENT)
Age: 33
End: 2023-01-12

## 2023-01-13 LAB
AFP MOM: 0.77
AFP VALUE: 33.6 NG/ML
ALPHA FETOPROTEIN SERUM COMMENT: NORMAL
ALPHA FETOPROTEIN SERUM INTERPRETATION: NORMAL
ALPHA FETOPROTEIN SERUM RESULTS: NORMAL
ALPHA FETOPROTEIN SERUM TEST RESULTS: NORMAL
CFTR MUT TESTED BLD/T: NEGATIVE
GESTATIONAL AGE BASED ON: NORMAL
GESTATIONAL AGE ON COLLECTION DATE: 19.3 WEEKS
INSULIN DEP DIABETES: NO
MATERNAL AGE AT EDD AFP: 35.3 YR
MULTIPLE GESTATION: NO
OSBR RISK 1 IN: NORMAL
RACE: NORMAL
WEIGHT AFP: 212 LBS

## 2023-01-18 ENCOUNTER — NON-APPOINTMENT (OUTPATIENT)
Age: 33
End: 2023-01-18

## 2023-01-19 ENCOUNTER — OUTPATIENT (OUTPATIENT)
Dept: OUTPATIENT SERVICES | Facility: HOSPITAL | Age: 33
LOS: 1 days | Discharge: HOME | End: 2023-01-19

## 2023-01-19 ENCOUNTER — APPOINTMENT (OUTPATIENT)
Dept: ANTEPARTUM | Facility: CLINIC | Age: 33
End: 2023-01-19
Payer: MEDICAID

## 2023-01-19 ENCOUNTER — NON-APPOINTMENT (OUTPATIENT)
Age: 33
End: 2023-01-19

## 2023-01-19 ENCOUNTER — ASOB RESULT (OUTPATIENT)
Age: 33
End: 2023-01-19

## 2023-01-19 ENCOUNTER — APPOINTMENT (OUTPATIENT)
Dept: OBGYN | Facility: CLINIC | Age: 33
End: 2023-01-19
Payer: MEDICAID

## 2023-01-19 ENCOUNTER — RESULT CHARGE (OUTPATIENT)
Age: 33
End: 2023-01-19

## 2023-01-19 VITALS
SYSTOLIC BLOOD PRESSURE: 116 MMHG | TEMPERATURE: 98.2 F | HEART RATE: 89 BPM | DIASTOLIC BLOOD PRESSURE: 63 MMHG | BODY MASS INDEX: 42.8 KG/M2 | WEIGHT: 234 LBS | OXYGEN SATURATION: 100 %

## 2023-01-19 VITALS
SYSTOLIC BLOOD PRESSURE: 122 MMHG | WEIGHT: 236 LBS | HEIGHT: 62 IN | DIASTOLIC BLOOD PRESSURE: 75 MMHG | BODY MASS INDEX: 43.43 KG/M2 | HEART RATE: 80 BPM

## 2023-01-19 DIAGNOSIS — Z98.890 OTHER SPECIFIED POSTPROCEDURAL STATES: Chronic | ICD-10-CM

## 2023-01-19 PROCEDURE — 99215 OFFICE O/P EST HI 40 MIN: CPT | Mod: 25

## 2023-01-19 PROCEDURE — 76817 TRANSVAGINAL US OBSTETRIC: CPT | Mod: 26

## 2023-01-19 PROCEDURE — ZZZZZ: CPT

## 2023-01-19 PROCEDURE — 99213 OFFICE O/P EST LOW 20 MIN: CPT

## 2023-01-19 PROCEDURE — 76811 OB US DETAILED SNGL FETUS: CPT | Mod: 26

## 2023-01-19 NOTE — DISCUSSION/SUMMARY
[FreeTextEntry1] : At the request of Dr. RESTREPO, the patient was seen for consultation for: \par - History of chronic hypertension not on medications. She states that she was diagnosed at some point during her life but never placed on medications and not sure if a work-up was ever done. Denies any headache of blurry vision or epigastric pain or nausea /vomiting.\par - She reports having 2 episodes of dizziness and fall during current pregnancy without loss of consciousness. Not sure why and never had a work-up for them. Denies seizure disorder. Unsure if hypoglycemic.\par - History of infant death with the firs pregnancy, unable to give more details on it as she got emotional and cried. \par - History of  twin delivery due to preeclampsia. \par \par \par She is 34 y/o  at 24 +0/7 weeks by given VAISHALI consistent with today's ultrasound.\par Pmhx: reports being diagnosed with hypertension but was never placed on medications. \par Left carpal tunnel syndrome. \par Pshx: LEEP, now PAP is negative but she has HPV positive. D&C for her SAB. Gum surgery. \par Meds: LDASA, and PNV.\par Allergies: NYQUIL. Tongue swells. \par Sochx: used to smoke marijuana and stopped when she found out she was pregnant. Denies smoking cigarettes or ETOH use. Lives in an apartment with her children. 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 \par Gynhx: history of LEEP and now HPV positive. Denies fibroids. Reports history of ovarian cysts in the past. \par Obhx: \par G1: FT  x1, 4 month girl infant death (patient did not desire to offer further details). Randee asked if it was an accident she said yes.\par G2: FT  x1 girl (12 years old now).\par G3: : FT LTCS x1, elective for suspected macrosomia, boy, 9 lbs 12 oz.\par G4: IUFD @5 months (per patient), s/p D&E\par G5: FT LTCS x1, elective repeat\par G6: 32w LTCS for twin pregnancy, complicated by pre-eclampsia at 8 months\par G7: ectopic pregnancy on 2022 s/p mtx treatment\par G8 current\par \par \par Physical exam:\par VS: WNL (see vitals) /75.\par Neck supple, no thyromegaly\par Chest: S1 S2 regular, no murmurs. CTAB.\par NO CVA tenderness.\par Abdomen is gravid,soft, and non tender.\par No calves pain or lower limb edema.\par \par Reviewed labs available in [employer]. \par US today shows: shows placenta previa and limited feet/ankles. EIF. \par \par Assessment:\par - 34 y/o  at 24 weeks GA\par - Advanced maternal age\par - History of infant death, patient did not share cause.  \par - History of  birth for PEC.\par - History of multiple  section.\par - History of a late pregnancy loss. \par - Chronic hypertension never on medications currently WNL.\par - S/p fall x2 following dizziness.\par - Morbid obesity.\par - HPV infection.\par - S/p recent methotrexate exposure.\par - History of genital herpes (noted on EMR header).\par - S/p LEEP\par - Former marijuana user. \par \par \par Counseling: \par I reviewed with the patient at length each of the above diagnosis, prognosis and effect on pregnancy and vice versa.\par \par Recommend:\par - Tests: reordered NIPT, ferritin, GCT, ECG, APLA.\par - Referrals: ENT and patient to go see cardiology (previously given referral)\par - Medications: continue LDASA. \par - Fetal Echo: given recent exposure to methotrexate. \par - Antepartum surveillance: to start at 32 weeks for chronic hypertension. \par - Fetal growth assessment every 4 weeks.\par - Check placental location at 28 weeks, in the meantime pelvic rest is recommended. \par - Given LEEP, I would have recommended serial CL but at this gestational age and given the normal CL today, no need for that.\par - Timing and mode of delivery: to be determined, for now at 36-37 weeks because of placenta previa and chronic hypertension unless otherwise indicated, via repeat  section.\par - HSV suppression at 36 weeks in case placenta previa resolves or PPROM.\par - Contraceptive planning: to be discussed.\par - Follow-up in [facility] in 2 weeks.\par - PEC precautions given.\par  \par

## 2023-01-24 ENCOUNTER — LABORATORY RESULT (OUTPATIENT)
Age: 33
End: 2023-01-24

## 2023-01-25 ENCOUNTER — NON-APPOINTMENT (OUTPATIENT)
Age: 33
End: 2023-01-25

## 2023-01-25 LAB
BILIRUB UR QL STRIP: NORMAL
BP DIAS: 63 MM HG
BP SYS: 116 MM HG
CLARITY UR: CLEAR
COLLECTION METHOD: NORMAL
FERRITIN SERPL-MCNC: 14 NG/ML
FETAL MOVEMENT: PRESENT
GLUCOSE 1H P 50 G GLC PO SERPL-MCNC: 89 MG/DL
GLUCOSE UR-MCNC: NORMAL
HCG UR QL: 0.2 EU/DL
HGB UR QL STRIP.AUTO: NORMAL
KETONES UR-MCNC: NORMAL
LEUKOCYTE ESTERASE UR QL STRIP: NORMAL
NITRITE UR QL STRIP: NORMAL
OB COMMENTS: NORMAL
PH UR STRIP: 6
PROT UR STRIP-MCNC: NORMAL
SCHEDULED VISIT: YES
SP GR UR STRIP: 1.03
URINE ALBUMIN/PROTEIN: NORMAL
URINE GLUCOSE: NORMAL
URINE KETONES: NORMAL
WEEKS GESTATION: 21.1

## 2023-02-02 ENCOUNTER — NON-APPOINTMENT (OUTPATIENT)
Age: 33
End: 2023-02-02

## 2023-02-02 LAB
B2 GLYCOPROT1 IGA SERPL IA-ACNC: <5 SAU
B2 GLYCOPROT1 IGG SER-ACNC: <5 SGU
B2 GLYCOPROT1 IGM SER-ACNC: <5 SMU
CARDIOLIPIN IGM SER-MCNC: 7.5 MPL
CARDIOLIPIN IGM SER-MCNC: <5 GPL
CHROMOSOME13 INTERPRETATION: NORMAL
CHROMOSOME13 TEST RESULT: NORMAL
CHROMOSOME18 INTERPRETATION: NORMAL
CHROMOSOME18 TEST RESULT: NORMAL
CHROMOSOME21 INTERPRETATION: NORMAL
CHROMOSOME21 TEST RESULT: NORMAL
CONFIRM: NORMAL
DRVVT IMM 1:2 NP PPP: NORMAL
DRVVT SCREEN TO CONFIRM RATIO: 0.87 RATIO
FETAL FRACTION: NORMAL
MICRODELETIONS INTERPRETATION: NORMAL
MICRODELETIONS RESULT: NORMAL
PERFORMANCE AND LIMITATIONS: NORMAL
SCREEN DRVVT: NORMAL
SEX CHROMOSOME INTERPRETATION: NORMAL
SEX CHROMOSOME TEST RESULT: NORMAL
VERIFI PRENATAL TEST: NOT DETECTED

## 2023-02-03 ENCOUNTER — NON-APPOINTMENT (OUTPATIENT)
Age: 33
End: 2023-02-03

## 2023-02-03 ENCOUNTER — APPOINTMENT (OUTPATIENT)
Dept: ANTEPARTUM | Facility: CLINIC | Age: 33
End: 2023-02-03

## 2023-02-15 ENCOUNTER — LABORATORY RESULT (OUTPATIENT)
Age: 33
End: 2023-02-15

## 2023-02-16 ENCOUNTER — APPOINTMENT (OUTPATIENT)
Dept: ANTEPARTUM | Facility: CLINIC | Age: 33
End: 2023-02-16
Payer: MEDICAID

## 2023-02-16 ENCOUNTER — NON-APPOINTMENT (OUTPATIENT)
Age: 33
End: 2023-02-16

## 2023-02-16 ENCOUNTER — APPOINTMENT (OUTPATIENT)
Dept: OBGYN | Facility: CLINIC | Age: 33
End: 2023-02-16
Payer: MEDICAID

## 2023-02-16 ENCOUNTER — APPOINTMENT (OUTPATIENT)
Dept: OBGYN | Facility: CLINIC | Age: 33
End: 2023-02-16

## 2023-02-16 ENCOUNTER — APPOINTMENT (OUTPATIENT)
Age: 33
End: 2023-02-16
Payer: MEDICAID

## 2023-02-16 ENCOUNTER — OUTPATIENT (OUTPATIENT)
Dept: OUTPATIENT SERVICES | Facility: HOSPITAL | Age: 33
LOS: 1 days | End: 2023-02-16
Payer: MEDICAID

## 2023-02-16 ENCOUNTER — ASOB RESULT (OUTPATIENT)
Age: 33
End: 2023-02-16

## 2023-02-16 ENCOUNTER — APPOINTMENT (OUTPATIENT)
Dept: ANTEPARTUM | Facility: CLINIC | Age: 33
End: 2023-02-16

## 2023-02-16 VITALS
WEIGHT: 233 LBS | HEART RATE: 82 BPM | TEMPERATURE: 98.7 F | BODY MASS INDEX: 42.62 KG/M2 | DIASTOLIC BLOOD PRESSURE: 64 MMHG | SYSTOLIC BLOOD PRESSURE: 130 MMHG | OXYGEN SATURATION: 98 %

## 2023-02-16 DIAGNOSIS — D64.9 ANEMIA, UNSPECIFIED: ICD-10-CM

## 2023-02-16 DIAGNOSIS — Z98.890 OTHER SPECIFIED POSTPROCEDURAL STATES: Chronic | ICD-10-CM

## 2023-02-16 DIAGNOSIS — O09.90 SUPERVISION OF HIGH RISK PREGNANCY, UNSPECIFIED, UNSPECIFIED TRIMESTER: ICD-10-CM

## 2023-02-16 PROCEDURE — 76816 OB US FOLLOW-UP PER FETUS: CPT

## 2023-02-16 PROCEDURE — 81002 URINALYSIS NONAUTO W/O SCOPE: CPT

## 2023-02-16 PROCEDURE — 76817 TRANSVAGINAL US OBSTETRIC: CPT | Mod: 26

## 2023-02-16 PROCEDURE — 76816 OB US FOLLOW-UP PER FETUS: CPT | Mod: 26

## 2023-02-16 PROCEDURE — 99215 OFFICE O/P EST HI 40 MIN: CPT

## 2023-02-16 PROCEDURE — 81001 URINALYSIS AUTO W/SCOPE: CPT

## 2023-02-16 PROCEDURE — 99215 OFFICE O/P EST HI 40 MIN: CPT | Mod: 25

## 2023-02-16 PROCEDURE — 87086 URINE CULTURE/COLONY COUNT: CPT

## 2023-02-16 PROCEDURE — 76817 TRANSVAGINAL US OBSTETRIC: CPT

## 2023-02-16 RX ORDER — DOCUSATE SODIUM 100 MG/1
100 CAPSULE ORAL TWICE DAILY
Qty: 60 | Refills: 0 | Status: ACTIVE | COMMUNITY
Start: 2023-02-16 | End: 1900-01-01

## 2023-02-17 ENCOUNTER — APPOINTMENT (OUTPATIENT)
Dept: OBGYN | Facility: CLINIC | Age: 33
End: 2023-02-17

## 2023-02-23 ENCOUNTER — NON-APPOINTMENT (OUTPATIENT)
Age: 33
End: 2023-02-23

## 2023-02-23 ENCOUNTER — APPOINTMENT (OUTPATIENT)
Dept: OBGYN | Facility: CLINIC | Age: 33
End: 2023-02-23

## 2023-02-28 DIAGNOSIS — O10.919 UNSPECIFIED PRE-EXISTING HYPERTENSION COMPLICATING PREGNANCY, UNSPECIFIED TRIMESTER: ICD-10-CM

## 2023-02-28 DIAGNOSIS — Z3A.26 26 WEEKS GESTATION OF PREGNANCY: ICD-10-CM

## 2023-02-28 DIAGNOSIS — O99.210 OBESITY COMPLICATING PREGNANCY, UNSPECIFIED TRIMESTER: ICD-10-CM

## 2023-02-28 DIAGNOSIS — O44.02 COMPLETE PLACENTA PREVIA NOS OR WITHOUT HEMORRHAGE, SECOND TRIMESTER: ICD-10-CM

## 2023-02-28 DIAGNOSIS — O34.219 MATERNAL CARE FOR UNSPECIFIED TYPE SCAR FROM PREVIOUS CESAREAN DELIVERY: ICD-10-CM

## 2023-02-28 DIAGNOSIS — O99.212 OBESITY COMPLICATING PREGNANCY, SECOND TRIMESTER: ICD-10-CM

## 2023-02-28 DIAGNOSIS — O09.522 SUPERVISION OF ELDERLY MULTIGRAVIDA, SECOND TRIMESTER: ICD-10-CM

## 2023-02-28 DIAGNOSIS — O44.00 COMPLETE PLACENTA PREVIA NOS OR WITHOUT HEMORRHAGE, UNSPECIFIED TRIMESTER: ICD-10-CM

## 2023-02-28 DIAGNOSIS — Z03.74 ENCOUNTER FOR SUSPECTED PROBLEM WITH FETAL GROWTH RULED OUT: ICD-10-CM

## 2023-03-02 ENCOUNTER — APPOINTMENT (OUTPATIENT)
Age: 33
End: 2023-03-02

## 2023-03-09 LAB — BACTERIA UR CULT: NORMAL

## 2023-03-14 ENCOUNTER — NON-APPOINTMENT (OUTPATIENT)
Age: 33
End: 2023-03-14

## 2023-03-16 ENCOUNTER — APPOINTMENT (OUTPATIENT)
Dept: ANTEPARTUM | Facility: CLINIC | Age: 33
End: 2023-03-16
Payer: MEDICAID

## 2023-03-16 ENCOUNTER — ASOB RESULT (OUTPATIENT)
Age: 33
End: 2023-03-16

## 2023-03-16 ENCOUNTER — RESULT CHARGE (OUTPATIENT)
Age: 33
End: 2023-03-16

## 2023-03-16 ENCOUNTER — OUTPATIENT (OUTPATIENT)
Dept: OUTPATIENT SERVICES | Facility: HOSPITAL | Age: 33
LOS: 1 days | End: 2023-03-16
Payer: MEDICAID

## 2023-03-16 ENCOUNTER — APPOINTMENT (OUTPATIENT)
Dept: OBGYN | Facility: CLINIC | Age: 33
End: 2023-03-16
Payer: MEDICAID

## 2023-03-16 VITALS
TEMPERATURE: 98.2 F | OXYGEN SATURATION: 97 % | DIASTOLIC BLOOD PRESSURE: 79 MMHG | HEART RATE: 100 BPM | BODY MASS INDEX: 45.18 KG/M2 | SYSTOLIC BLOOD PRESSURE: 128 MMHG | WEIGHT: 247 LBS

## 2023-03-16 DIAGNOSIS — O09.90 SUPERVISION OF HIGH RISK PREGNANCY, UNSPECIFIED, UNSPECIFIED TRIMESTER: ICD-10-CM

## 2023-03-16 DIAGNOSIS — Z98.890 OTHER SPECIFIED POSTPROCEDURAL STATES: Chronic | ICD-10-CM

## 2023-03-16 DIAGNOSIS — Z34.90 ENCOUNTER FOR SUPERVISION OF NORMAL PREGNANCY, UNSPECIFIED, UNSPECIFIED TRIMESTER: ICD-10-CM

## 2023-03-16 LAB
BILIRUB UR QL STRIP: NEGATIVE
BILIRUB UR QL STRIP: NORMAL
CLARITY UR: CLEAR
CLARITY UR: NORMAL
COLLECTION METHOD: NORMAL
COLLECTION METHOD: NORMAL
FETAL MOVEMENT: PRESENT
GLUCOSE UR-MCNC: NEGATIVE
GLUCOSE UR-MCNC: NORMAL
HCG UR QL: 0.2 EU/DL
HCG UR QL: 0.2 EU/DL
HGB UR QL STRIP.AUTO: NEGATIVE
HGB UR QL STRIP.AUTO: NORMAL
KETONES UR-MCNC: NEGATIVE
KETONES UR-MCNC: NORMAL
LEUKOCYTE ESTERASE UR QL STRIP: NEGATIVE
LEUKOCYTE ESTERASE UR QL STRIP: NORMAL
NITRITE UR QL STRIP: NORMAL
NITRITE UR QL STRIP: POSITIVE
OB COMMENTS: NORMAL
PH UR STRIP: 5
PH UR STRIP: 7
PROT UR STRIP-MCNC: NORMAL
PROT UR STRIP-MCNC: NORMAL
SCHEDULED VISIT: YES
SP GR UR STRIP: 1.02
SP GR UR STRIP: 1.03
URINE ALBUMIN/PROTEIN: NORMAL
URINE GLUCOSE: NEGATIVE
URINE KETONES: NEGATIVE
WEEKS GESTATION: 26.4

## 2023-03-16 PROCEDURE — 76816 OB US FOLLOW-UP PER FETUS: CPT | Mod: 26

## 2023-03-16 PROCEDURE — ZZZZZ: CPT

## 2023-03-16 PROCEDURE — 76817 TRANSVAGINAL US OBSTETRIC: CPT | Mod: 26

## 2023-03-16 PROCEDURE — 99214 OFFICE O/P EST MOD 30 MIN: CPT

## 2023-03-16 PROCEDURE — 81002 URINALYSIS NONAUTO W/O SCOPE: CPT

## 2023-03-16 PROCEDURE — 99214 OFFICE O/P EST MOD 30 MIN: CPT | Mod: 25

## 2023-03-16 PROCEDURE — 76817 TRANSVAGINAL US OBSTETRIC: CPT

## 2023-03-16 PROCEDURE — 76816 OB US FOLLOW-UP PER FETUS: CPT

## 2023-03-22 DIAGNOSIS — O09.529 SUPERVISION OF ELDERLY MULTIGRAVIDA, UNSPECIFIED TRIMESTER: ICD-10-CM

## 2023-03-22 DIAGNOSIS — O99.213 OBESITY COMPLICATING PREGNANCY, THIRD TRIMESTER: ICD-10-CM

## 2023-03-22 DIAGNOSIS — O44.03 COMPLETE PLACENTA PREVIA NOS OR WITHOUT HEMORRHAGE, THIRD TRIMESTER: ICD-10-CM

## 2023-03-22 DIAGNOSIS — O10.919 UNSPECIFIED PRE-EXISTING HYPERTENSION COMPLICATING PREGNANCY, UNSPECIFIED TRIMESTER: ICD-10-CM

## 2023-03-22 DIAGNOSIS — Z3A.30 30 WEEKS GESTATION OF PREGNANCY: ICD-10-CM

## 2023-03-22 DIAGNOSIS — O44.00 COMPLETE PLACENTA PREVIA NOS OR WITHOUT HEMORRHAGE, UNSPECIFIED TRIMESTER: ICD-10-CM

## 2023-03-22 DIAGNOSIS — O99.212 OBESITY COMPLICATING PREGNANCY, SECOND TRIMESTER: ICD-10-CM

## 2023-03-22 DIAGNOSIS — Z03.74 ENCOUNTER FOR SUSPECTED PROBLEM WITH FETAL GROWTH RULED OUT: ICD-10-CM

## 2023-03-23 ENCOUNTER — NON-APPOINTMENT (OUTPATIENT)
Age: 33
End: 2023-03-23

## 2023-03-27 ENCOUNTER — APPOINTMENT (OUTPATIENT)
Dept: ANTEPARTUM | Facility: CLINIC | Age: 33
End: 2023-03-27
Payer: MEDICAID

## 2023-03-27 ENCOUNTER — ASOB RESULT (OUTPATIENT)
Age: 33
End: 2023-03-27

## 2023-03-27 ENCOUNTER — NON-APPOINTMENT (OUTPATIENT)
Age: 33
End: 2023-03-27

## 2023-03-27 ENCOUNTER — RESULT CHARGE (OUTPATIENT)
Age: 33
End: 2023-03-27

## 2023-03-27 ENCOUNTER — OUTPATIENT (OUTPATIENT)
Dept: OUTPATIENT SERVICES | Facility: HOSPITAL | Age: 33
LOS: 1 days | End: 2023-03-27
Payer: MEDICAID

## 2023-03-27 VITALS
TEMPERATURE: 98 F | HEART RATE: 75 BPM | DIASTOLIC BLOOD PRESSURE: 58 MMHG | OXYGEN SATURATION: 97 % | SYSTOLIC BLOOD PRESSURE: 109 MMHG | BODY MASS INDEX: 44.63 KG/M2 | WEIGHT: 244 LBS

## 2023-03-27 DIAGNOSIS — Z98.890 OTHER SPECIFIED POSTPROCEDURAL STATES: Chronic | ICD-10-CM

## 2023-03-27 DIAGNOSIS — Z34.90 ENCOUNTER FOR SUPERVISION OF NORMAL PREGNANCY, UNSPECIFIED, UNSPECIFIED TRIMESTER: ICD-10-CM

## 2023-03-27 PROCEDURE — 99214 OFFICE O/P EST MOD 30 MIN: CPT | Mod: 25

## 2023-03-27 PROCEDURE — 81002 URINALYSIS NONAUTO W/O SCOPE: CPT

## 2023-03-27 PROCEDURE — 87086 URINE CULTURE/COLONY COUNT: CPT

## 2023-03-27 PROCEDURE — 76818 FETAL BIOPHYS PROFILE W/NST: CPT

## 2023-03-27 PROCEDURE — 76818 FETAL BIOPHYS PROFILE W/NST: CPT | Mod: 26

## 2023-03-27 PROCEDURE — ZZZZZ: CPT

## 2023-03-27 PROCEDURE — 84156 ASSAY OF PROTEIN URINE: CPT

## 2023-03-27 PROCEDURE — 82570 ASSAY OF URINE CREATININE: CPT

## 2023-03-27 PROCEDURE — 99214 OFFICE O/P EST MOD 30 MIN: CPT

## 2023-03-27 RX ORDER — CEPHALEXIN 500 MG/1
500 TABLET ORAL EVERY 6 HOURS
Qty: 28 | Refills: 0 | Status: ACTIVE | COMMUNITY
Start: 2023-03-27 | End: 1900-01-01

## 2023-03-28 ENCOUNTER — NON-APPOINTMENT (OUTPATIENT)
Age: 33
End: 2023-03-28

## 2023-03-28 ENCOUNTER — APPOINTMENT (OUTPATIENT)
Dept: OBGYN | Facility: CLINIC | Age: 33
End: 2023-03-28
Payer: MEDICAID

## 2023-03-28 ENCOUNTER — OUTPATIENT (OUTPATIENT)
Dept: OUTPATIENT SERVICES | Facility: HOSPITAL | Age: 33
LOS: 1 days | End: 2023-03-28
Payer: MEDICAID

## 2023-03-28 VITALS
HEIGHT: 62 IN | BODY MASS INDEX: 44.9 KG/M2 | DIASTOLIC BLOOD PRESSURE: 80 MMHG | WEIGHT: 244 LBS | SYSTOLIC BLOOD PRESSURE: 110 MMHG

## 2023-03-28 DIAGNOSIS — Z98.890 OTHER SPECIFIED POSTPROCEDURAL STATES: Chronic | ICD-10-CM

## 2023-03-28 DIAGNOSIS — Z34.90 ENCOUNTER FOR SUPERVISION OF NORMAL PREGNANCY, UNSPECIFIED, UNSPECIFIED TRIMESTER: ICD-10-CM

## 2023-03-28 LAB
BILIRUB UR QL STRIP: NORMAL
BP DIAS: 58 MM HG
BP SYS: 109 MM HG
CLARITY UR: CLEAR
COLLECTION METHOD: NORMAL
FETAL MOVEMENT: PRESENT
GLUCOSE UR-MCNC: NORMAL
HCG UR QL: 0.2 EU/DL
HGB UR QL STRIP.AUTO: NORMAL
KETONES UR-MCNC: NORMAL
LEUKOCYTE ESTERASE UR QL STRIP: NORMAL
NITRITE UR QL STRIP: NORMAL
OB COMMENTS: NORMAL
PH UR STRIP: 5
PROT UR STRIP-MCNC: NORMAL
SCHEDULED VISIT: YES
SP GR UR STRIP: 1.03
URINE ALBUMIN/PROTEIN: NORMAL
URINE GLUCOSE: NORMAL
URINE KETONES: NORMAL
WEEKS GESTATION: 32.1

## 2023-03-28 PROCEDURE — 81002 URINALYSIS NONAUTO W/O SCOPE: CPT

## 2023-03-28 PROCEDURE — 99213 OFFICE O/P EST LOW 20 MIN: CPT

## 2023-03-28 NOTE — END OF VISIT
[] : Resident [FreeTextEntry3] : Patient was seen and assessed with Dr. Harrington. I edited the note above. I agree with the above assessment and recommendations/plan of care.

## 2023-03-28 NOTE — DISCUSSION/SUMMARY
[FreeTextEntry1] : 34 y/o  at 32w1d VAISHALI 23 by 1st trimester sonogram, here for follow-up [facility] visit for chronic HTN and poor obstetric history co-managed by Dr. Vásquez.  \par \par Currently denies vaginal bleeding or contractions. Reports leaking of clear fluid but patient reports she "always leaking" and has history of incontinence. Denies change in vaginal discharge. Reports good fetal movement. Has not been taking BPs daily. Has a BP cuff at home and checked BP twice in last week, did not write values down but states they were not elevated. Denies headaches, vision change, SOB, chest pain, RUQ/epigastric pain, or LE swelling. Denies dysuria, hematuria, increases urgency or frequency. \par  \par Pmhx: reports being diagnosed with hypertension but was never placed on medications.  \par Left carpal tunnel syndrome.  \par Pshx: LEEP, now PAP is negative but she has been HPV positive. D&C for her SAB. Gum surgery.  \par Meds: LDASA, and PNV.  \par Allergies: NYQUIL. Tongue swells.  \par Sochx: used to smoke marijuana and stopped when she found out she was pregnant. Denies smoking cigarettes or ETOH use. Lives in an apartment with her children. 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  \par Gynhx: history of LEEP and now HPV positive. Denies fibroids. Reports history of ovarian cysts in the past.  \par \par Obhx:  \par G1: FT  x1, 4 month girl infant death (patient did not desire to offer further details). When asked if it was an accident she said yes.  \par G2: FT  x1 girl (12 years old now).  \par G3: : FT LTCS x1, elective for suspected macrosomia, boy, 9 lbs 12 oz.  \par G4: IUFD @5 months (per patient), s/p D&E  \par G5: FT LTCS x1, elective repeat  \par G6: 32w LTCS for twin pregnancy, complicated by pre-eclampsia at 8 months  \par G7: ectopic pregnancy on 2022 s/p mtx treatment  \par G8 current  \par \par ----- \par Physical exam:  \par VS: Wt 244 lbs, /58, HR 75, T 98 F, BMI 44, O2 sat 97, FHR (see sono report)\par Udip: trace ketones, 3+ protein, pos nitrites\par \par General: In no apparent distress. \par Cardiovascular: Regular rate and rhythm, normal S1/S2 \par Pulmonary: Clear to auscultation bilaterally.  No wheezing. \par Abdomen: soft, gravid, nontender, nondistended, no rebound, no guarding \par Extremities: no calf tenderness or edema \par Neurology: +2 reflexes in bilateral upper extremities \par \par -------- \par Reviewed labs available in [employer].  \par  Ferritin: 14, GCT: 89, NIPT: Low risk, APLS labs WNL, 24hr UTP 60, baseline PELs WNL  \par \par OBUS:  \par : @22w4d, breech, posterior previa-central, MVP: 5.21cm, EFW: 569gm, feet/ankles not visualized, EIF, otherwise WNL  \par : @26w4d, vertex, posterior placenta previa, MVP 7.8cm, EFW 932g (32%), feet/ankles WNL  \par 3/16: @30w4d, vertex, posterior complete previa, MVP 5.51cm, BPP 8/8, EFW 1645g (46%) \par --------------- \par Assessment and Plan:  \par 34 y/o  at 32w1d VAISHALI 23 by 1st trimester sonogram, here for follow-up [facility] visit for chronic HTN and poor obstetric history co-managed by Dr. Vásquez.   \par \par #Chronic hypertension never on medications currently WNL.  \par - Patient did not bring BP log. Counseled patient on importance of taking BPs every day and logging them. BP parameters given.  \par - BP today: 109/58\par - Preeclamptic precautions.  \par - Fetal growth every 4 weeks.  \par - Antepartum surveillance: to start at 32w with BPP/NST twice weekly for comorbidities and poor OB history including IUFD. \par - Cardiology referral given but patient overwhelmed by different appointments. Patient encouraged to make the appointment. \par - Continue ASA 81 mg. \par \par #Complete Placenta previa, history of 3 prior C/S.\par - Bleeding precautions. \par - Pelvic rest.  \par - Timing and mode of delivery: 36-37w, repeat  section #4 because of placenta previa and chronic hypertension.  \par - F/u u/s report from today. Verbal low suspicion for accreta spectrum.  \par \par #History of  birth for PEC.  \par - Operative report on [employer] reviewed for last pregnancy with 32w CS - low transverse uterine incision performed. \par -  24hr UTP 60, baseline PELs WNL  \par - 3+ protein on Udip today, may be increased in setting of possible UTI, however will send for repeat PELs and 24hr UTP, uprcr\par \par #History of a late pregnancy loss.  \par - BPP/NST twice weekly \par \par #positive nitrites, possible UTI\par - f/u urine culture.\par - empiric antibiotics with Keflex sent.\par \par #leakage of fluid vs urinary incontinence\par - Discussed need to evaluate for PPROM at L&D for reported leakage of fluid. However patient declined stating that she is "always leaking" and it has not changed. Patient also had other obligations today and said that she will be willing to be evaluated if it continues by next week. Discussed risks of possible PPROM without evaluation including infection and PTL, sepsis, and its consequences. Patient voiced understanding. \par \par #AMA  \par - NIPT low risk.\par \par #History of infant death, patient did not share cause as it is too painful for her to share.\par - Twice weekly antepartum surveillance. \par \par # S/p fall x2 due to balance will have patient see ENT  \par - Referral for ENT offered but patient declined due to inability to make multiple appointments. No recent falls or dizziness episodes.  \par \par #Morbid obesity.  \par - BMI 44\par - Antepartum surveillance.\par - Surgical preparedness at time of delivery.  \par - Anesthesia consultation \par #S/p methotrexate exposure.  \par - fetal echo scheduled - not done  \par \par #History of genital herpes (noted on EMR header).  \par - Start Valtrex at 36w for suppression.  \par \par #pregnancy \par - Contraceptive planning: patient does not want BTL, considering hormonal IUD (eg. Mirena)\par - Continue prenatal care with Dr. Vásquez. \par \par Follow-up in one week. \par

## 2023-03-29 DIAGNOSIS — Z34.90 ENCOUNTER FOR SUPERVISION OF NORMAL PREGNANCY, UNSPECIFIED, UNSPECIFIED TRIMESTER: ICD-10-CM

## 2023-03-30 ENCOUNTER — APPOINTMENT (OUTPATIENT)
Dept: ANTEPARTUM | Facility: CLINIC | Age: 33
End: 2023-03-30

## 2023-03-30 DIAGNOSIS — Z87.51 PERSONAL HISTORY OF PRE-TERM LABOR: ICD-10-CM

## 2023-03-30 DIAGNOSIS — O34.219 MATERNAL CARE FOR UNSPECIFIED TYPE SCAR FROM PREVIOUS CESAREAN DELIVERY: ICD-10-CM

## 2023-03-30 DIAGNOSIS — O10.919 UNSPECIFIED PRE-EXISTING HYPERTENSION COMPLICATING PREGNANCY, UNSPECIFIED TRIMESTER: ICD-10-CM

## 2023-03-30 DIAGNOSIS — O09.523 SUPERVISION OF ELDERLY MULTIGRAVIDA, THIRD TRIMESTER: ICD-10-CM

## 2023-03-30 DIAGNOSIS — Z3A.32 32 WEEKS GESTATION OF PREGNANCY: ICD-10-CM

## 2023-03-30 DIAGNOSIS — O44.03 COMPLETE PLACENTA PREVIA NOS OR WITHOUT HEMORRHAGE, THIRD TRIMESTER: ICD-10-CM

## 2023-03-30 DIAGNOSIS — O09.529 SUPERVISION OF ELDERLY MULTIGRAVIDA, UNSPECIFIED TRIMESTER: ICD-10-CM

## 2023-03-30 DIAGNOSIS — O99.210 OBESITY COMPLICATING PREGNANCY, UNSPECIFIED TRIMESTER: ICD-10-CM

## 2023-03-30 DIAGNOSIS — O99.213 OBESITY COMPLICATING PREGNANCY, THIRD TRIMESTER: ICD-10-CM

## 2023-03-30 DIAGNOSIS — O34.218 MATERNAL CARE FOR OTHER TYPE SCAR FROM PREVIOUS CESAREAN DELIVERY: ICD-10-CM

## 2023-04-03 ENCOUNTER — APPOINTMENT (OUTPATIENT)
Dept: ANTEPARTUM | Facility: CLINIC | Age: 33
End: 2023-04-03

## 2023-04-05 ENCOUNTER — APPOINTMENT (OUTPATIENT)
Dept: CARDIOLOGY | Facility: CLINIC | Age: 33
End: 2023-04-05

## 2023-04-06 ENCOUNTER — APPOINTMENT (OUTPATIENT)
Dept: ANTEPARTUM | Facility: CLINIC | Age: 33
End: 2023-04-06

## 2023-04-10 ENCOUNTER — OUTPATIENT (OUTPATIENT)
Dept: OUTPATIENT SERVICES | Facility: HOSPITAL | Age: 33
LOS: 1 days | End: 2023-04-10
Payer: MEDICAID

## 2023-04-10 ENCOUNTER — APPOINTMENT (OUTPATIENT)
Dept: ANTEPARTUM | Facility: CLINIC | Age: 33
End: 2023-04-10
Payer: MEDICAID

## 2023-04-10 ENCOUNTER — ASOB RESULT (OUTPATIENT)
Age: 33
End: 2023-04-10

## 2023-04-10 DIAGNOSIS — Z98.890 OTHER SPECIFIED POSTPROCEDURAL STATES: Chronic | ICD-10-CM

## 2023-04-10 DIAGNOSIS — Z34.90 ENCOUNTER FOR SUPERVISION OF NORMAL PREGNANCY, UNSPECIFIED, UNSPECIFIED TRIMESTER: ICD-10-CM

## 2023-04-10 PROCEDURE — 76818 FETAL BIOPHYS PROFILE W/NST: CPT

## 2023-04-10 PROCEDURE — 99215 OFFICE O/P EST HI 40 MIN: CPT | Mod: 25

## 2023-04-10 PROCEDURE — 76818 FETAL BIOPHYS PROFILE W/NST: CPT | Mod: 26

## 2023-04-10 PROCEDURE — 99215 OFFICE O/P EST HI 40 MIN: CPT

## 2023-04-10 NOTE — DISCUSSION/SUMMARY
[FreeTextEntry1] : 3/16/23\par MFM Att'g Consult\par 32 y/o  at 34w1d VAISHALI 23 by 1st trimester sonogram, here for follow-up MFM visit for chronic HTN and poor obstetric history and complete posterior placenta previa co-managed by Dr. Vásquez. \par Patient reports back pain improved.  Denies LOF, VB. Good fetal movement. Reports headache unchanged from before and intermittent white spots in vision (which she's had for a long time and prior to pregnancy).  Denies chest pain, SOB, nausea, vomiting, RUQ pain, LE edema. Not taking BPs daily because she does not have a BP cuff. \par PMHx: Reports being diagnosed with hypertension but was never placed on medications.  \par            Left carpal tunnel syndrome.  \par Surg: LEEP, now PAP is negative but she has been HPV positive. D&C for her SAB. Gum surgery.  \par Meds: LDASA, and PNV. \par Allergies: NYQUIL. Tongue swells.  \par Sochx: used to smoke marijuana and stopped when she found out she was pregnant. Denies smoking cigarettes or ETOH use. Lives in an apartment with her children. 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  Has had difficulty attending PNV more than once weekly.  \par GynHx: history of LEEP and now HPV positive. Denies fibroids. Reports history of ovarian cysts in the past.  \par ObHx:  \par G1: FT  x1, 4 month girl infant death (patient did not desire to offer further details). When asked if it was an accident she said yes. \par G2: FT  x1 girl (12 years old now). \par G3: : FT LTCS x1, elective for suspected macrosomia, boy, 9 lbs 12 oz. \par G4: IUFD @5 months (per patient), s/p D&E \par G5: FT LTCS x1, elective repeat \par G6: 32w LTCS for twin pregnancy, complicated by pre-eclampsia at 8 months \par G7: ectopic pregnancy on 2022 s/p mtx treatment \par G8 current \par \par Physical exam: \par VS: Wt 247lbs, /69, HR 91, O2 sat 97% (recorded in [employer]). \par U dip: trace ketones, trace protein\par Heart: RRR, no M/R/G. \par Lungs: CTAB. \par Abdomen: Gravid,soft, and non tender. \par LE: no erythema, edema or pain. \par \par Reviewed labs available in [employer].  Opos/AntibNeg; HbEP AA ()\par : CBC 10k>11/34%<245k\par  Ferritin: 14, GCT: 89, NIPT: Low risk, APLS labs WNL, 24hr UTP 60, baseline PELs WNL \par Third trimester labs not found. \par \par OBUS:  \par : @22w4d, breech, posterior previa-central, MVP: 5.21cm, EFW: 569gm, feet/ankles not visualized, EIF, otherwise WNL \par : @26w4d, vertex, posterior placenta previa, MVP 7.8cm, EFW 932g (32%), feet/ankles WNL \par 3/16: @30w4d, vertex, posterior complete previa, MVP 5.51cm, BPP 8/8, EFW 1645g (46%)\par 4/10: Single fetus \par \par ---------------\par Assessment and Plan: \par 32 y/o  at 34w1d VAISHALI 23 by 1st trimester sonogram, here for follow-up [facility] visit for chronic HTN and poor obstetric history co-managed by Dr. Vásquez. \par \par 1. Chronic hypertension never on medications currently WNL. \par - BP log not done at home as patient has not yet picked up BP cuff. Patient stated that she will go to pharmacy to pick it up today. BP parameters given. \par - Preeclamptic precautions. \par - Fetal growth every 4 weeks. \par - Antepartum surveillance: to start at 32w with BPP/NST twice weekly for comorbidities and poor OB history including IUFD.\par - Cardiology referral given but patient overwhelmed by different appointments. \par - Continue ASA 81mg. Sometimes patient forgets doses. \par \par 2. Complete Placenta previa.  Bleeding precautions.\par - Pelvic rest.  \par - Timing and mode of delivery: , repeat  section #4 because of placenta previa and chronic hypertension.  \par 3. AMA: NIPT low risk  \par 4. History of infant death, patient has not shared cause \par 5. History of  birth for PEC. \par 6. History of a late pregnancy loss.  \par 7. S/p fall x2 due to balance will have patient see ENT.  Referral for ENT offered but patient declined due to inability to make multiple appointments. No recent falls or dizziness episodes. \par 8. Morbid obesity.  \par 9. S/p methotrexate exposure. fetal echo scheduled - report not found.  \par 10. History of genital herpes (noted on EMR header). \par - Start Valtrex at 36w for suppression. \par \par 11. pregnancy\par - Contraceptive planning: Pt requests Mirena IUD at 6w PP.  She understands availability of single dose Depo for interval protection postpartum. \par - Follow-up in [facility] in 3d for fetal testing, 1 week for fetal testing and MFM consult\par - Third trimester labs not found. \par \par MD Weston, FACOG\par  \par

## 2023-04-10 NOTE — END OF VISIT
[FreeTextEntry3] : [facility]\par \par I saw and evaluated Ms. LEWIS with Dr. Harrington.\par \par cHTN on no medication\par - History of preeclampsia her last delivery\par - Recommend checking blood pressures daily.\par - Recommend twice weekly biophysical profiles with non stress tests\par - Continue Aspirin 81 mg PO daily, I recommended she set an alarm in her phone to remember to take the aspirin.  \par \par Complete placenta previa\par - History of  delivery X 3 and a D&E.\par - Evaluate for placenta accreta her next ultrasound.  \par \par Wrist pain\par - Has not been using wrist braces\par \par Fell earlier in the pregnancy\par - Declined referral to ENT\par \par Prenatal care is with Dr. Vásquez.\par Twice weekly biophysical profiles with non stress tests to start at around 32 weeks gestation.\par Preeclampsia, fetal movement, and labor precautions discussed.\par Follow up in two weeks with the blood pressure log.\par \par Pablo Connolly MD\par

## 2023-04-11 ENCOUNTER — APPOINTMENT (OUTPATIENT)
Dept: OBGYN | Facility: CLINIC | Age: 33
End: 2023-04-11

## 2023-04-12 DIAGNOSIS — O99.213 OBESITY COMPLICATING PREGNANCY, THIRD TRIMESTER: ICD-10-CM

## 2023-04-12 DIAGNOSIS — O34.218 MATERNAL CARE FOR OTHER TYPE SCAR FROM PREVIOUS CESAREAN DELIVERY: ICD-10-CM

## 2023-04-12 DIAGNOSIS — O09.523 SUPERVISION OF ELDERLY MULTIGRAVIDA, THIRD TRIMESTER: ICD-10-CM

## 2023-04-12 DIAGNOSIS — O34.219 MATERNAL CARE FOR UNSPECIFIED TYPE SCAR FROM PREVIOUS CESAREAN DELIVERY: ICD-10-CM

## 2023-04-12 DIAGNOSIS — Z3A.34 34 WEEKS GESTATION OF PREGNANCY: ICD-10-CM

## 2023-04-12 DIAGNOSIS — O10.919 UNSPECIFIED PRE-EXISTING HYPERTENSION COMPLICATING PREGNANCY, UNSPECIFIED TRIMESTER: ICD-10-CM

## 2023-04-12 DIAGNOSIS — O09.529 SUPERVISION OF ELDERLY MULTIGRAVIDA, UNSPECIFIED TRIMESTER: ICD-10-CM

## 2023-04-12 DIAGNOSIS — Z98.891 HISTORY OF UTERINE SCAR FROM PREVIOUS SURGERY: ICD-10-CM

## 2023-04-12 DIAGNOSIS — O44.03 COMPLETE PLACENTA PREVIA NOS OR WITHOUT HEMORRHAGE, THIRD TRIMESTER: ICD-10-CM

## 2023-04-12 DIAGNOSIS — O44.00 COMPLETE PLACENTA PREVIA NOS OR WITHOUT HEMORRHAGE, UNSPECIFIED TRIMESTER: ICD-10-CM

## 2023-04-13 ENCOUNTER — ASOB RESULT (OUTPATIENT)
Age: 33
End: 2023-04-13

## 2023-04-13 ENCOUNTER — APPOINTMENT (OUTPATIENT)
Dept: ANTEPARTUM | Facility: CLINIC | Age: 33
End: 2023-04-13
Payer: MEDICAID

## 2023-04-13 ENCOUNTER — OUTPATIENT (OUTPATIENT)
Dept: OUTPATIENT SERVICES | Facility: HOSPITAL | Age: 33
LOS: 1 days | End: 2023-04-13
Payer: MEDICAID

## 2023-04-13 DIAGNOSIS — Z98.890 OTHER SPECIFIED POSTPROCEDURAL STATES: Chronic | ICD-10-CM

## 2023-04-13 DIAGNOSIS — Z34.90 ENCOUNTER FOR SUPERVISION OF NORMAL PREGNANCY, UNSPECIFIED, UNSPECIFIED TRIMESTER: ICD-10-CM

## 2023-04-13 PROCEDURE — 76818 FETAL BIOPHYS PROFILE W/NST: CPT | Mod: 26

## 2023-04-13 PROCEDURE — 76816 OB US FOLLOW-UP PER FETUS: CPT | Mod: 26

## 2023-04-13 PROCEDURE — 76818 FETAL BIOPHYS PROFILE W/NST: CPT

## 2023-04-13 PROCEDURE — 76816 OB US FOLLOW-UP PER FETUS: CPT

## 2023-04-17 ENCOUNTER — NON-APPOINTMENT (OUTPATIENT)
Age: 33
End: 2023-04-17

## 2023-04-17 ENCOUNTER — APPOINTMENT (OUTPATIENT)
Dept: ANTEPARTUM | Facility: CLINIC | Age: 33
End: 2023-04-17
Payer: MEDICAID

## 2023-04-17 ENCOUNTER — ASOB RESULT (OUTPATIENT)
Age: 33
End: 2023-04-17

## 2023-04-17 ENCOUNTER — OUTPATIENT (OUTPATIENT)
Dept: OUTPATIENT SERVICES | Facility: HOSPITAL | Age: 33
LOS: 1 days | End: 2023-04-17
Payer: MEDICAID

## 2023-04-17 DIAGNOSIS — Z98.890 OTHER SPECIFIED POSTPROCEDURAL STATES: Chronic | ICD-10-CM

## 2023-04-17 DIAGNOSIS — Z34.90 ENCOUNTER FOR SUPERVISION OF NORMAL PREGNANCY, UNSPECIFIED, UNSPECIFIED TRIMESTER: ICD-10-CM

## 2023-04-17 PROCEDURE — 76818 FETAL BIOPHYS PROFILE W/NST: CPT | Mod: 26

## 2023-04-17 PROCEDURE — 76818 FETAL BIOPHYS PROFILE W/NST: CPT

## 2023-04-17 PROCEDURE — ZZZZZ: CPT

## 2023-04-18 ENCOUNTER — OUTPATIENT (OUTPATIENT)
Dept: OUTPATIENT SERVICES | Facility: HOSPITAL | Age: 33
LOS: 1 days | End: 2023-04-18
Payer: MEDICAID

## 2023-04-18 ENCOUNTER — APPOINTMENT (OUTPATIENT)
Dept: ANTEPARTUM | Facility: CLINIC | Age: 33
End: 2023-04-18
Payer: MEDICAID

## 2023-04-18 ENCOUNTER — ASOB RESULT (OUTPATIENT)
Age: 33
End: 2023-04-18

## 2023-04-18 DIAGNOSIS — Z34.90 ENCOUNTER FOR SUPERVISION OF NORMAL PREGNANCY, UNSPECIFIED, UNSPECIFIED TRIMESTER: ICD-10-CM

## 2023-04-18 DIAGNOSIS — Z98.890 OTHER SPECIFIED POSTPROCEDURAL STATES: Chronic | ICD-10-CM

## 2023-04-18 PROCEDURE — 76818 FETAL BIOPHYS PROFILE W/NST: CPT | Mod: 26

## 2023-04-18 PROCEDURE — ZZZZZ: CPT

## 2023-04-18 PROCEDURE — 76818 FETAL BIOPHYS PROFILE W/NST: CPT

## 2023-04-19 ENCOUNTER — NON-APPOINTMENT (OUTPATIENT)
Age: 33
End: 2023-04-19

## 2023-04-20 ENCOUNTER — ASOB RESULT (OUTPATIENT)
Age: 33
End: 2023-04-20

## 2023-04-20 ENCOUNTER — APPOINTMENT (OUTPATIENT)
Dept: ANTEPARTUM | Facility: CLINIC | Age: 33
End: 2023-04-20
Payer: MEDICAID

## 2023-04-20 ENCOUNTER — RESULT CHARGE (OUTPATIENT)
Age: 33
End: 2023-04-20

## 2023-04-20 ENCOUNTER — OUTPATIENT (OUTPATIENT)
Dept: OUTPATIENT SERVICES | Facility: HOSPITAL | Age: 33
LOS: 1 days | End: 2023-04-20
Payer: MEDICAID

## 2023-04-20 VITALS
SYSTOLIC BLOOD PRESSURE: 128 MMHG | DIASTOLIC BLOOD PRESSURE: 78 MMHG | WEIGHT: 250 LBS | BODY MASS INDEX: 45.73 KG/M2 | TEMPERATURE: 98.1 F | HEART RATE: 97 BPM | OXYGEN SATURATION: 98 %

## 2023-04-20 DIAGNOSIS — Z98.890 OTHER SPECIFIED POSTPROCEDURAL STATES: Chronic | ICD-10-CM

## 2023-04-20 DIAGNOSIS — O09.90 SUPERVISION OF HIGH RISK PREGNANCY, UNSPECIFIED, UNSPECIFIED TRIMESTER: ICD-10-CM

## 2023-04-20 DIAGNOSIS — O09.523 SUPERVISION OF ELDERLY MULTIGRAVIDA, THIRD TRIMESTER: ICD-10-CM

## 2023-04-20 DIAGNOSIS — O44.03 COMPLETE PLACENTA PREVIA NOS OR WITHOUT HEMORRHAGE, THIRD TRIMESTER: ICD-10-CM

## 2023-04-20 DIAGNOSIS — Z3A.35 35 WEEKS GESTATION OF PREGNANCY: ICD-10-CM

## 2023-04-20 DIAGNOSIS — O34.219 MATERNAL CARE FOR UNSPECIFIED TYPE SCAR FROM PREVIOUS CESAREAN DELIVERY: ICD-10-CM

## 2023-04-20 DIAGNOSIS — Z34.90 ENCOUNTER FOR SUPERVISION OF NORMAL PREGNANCY, UNSPECIFIED, UNSPECIFIED TRIMESTER: ICD-10-CM

## 2023-04-20 DIAGNOSIS — O99.213 OBESITY COMPLICATING PREGNANCY, THIRD TRIMESTER: ICD-10-CM

## 2023-04-20 DIAGNOSIS — A60.04 HERPESVIRAL VULVOVAGINITIS: ICD-10-CM

## 2023-04-20 DIAGNOSIS — B00.7: ICD-10-CM

## 2023-04-20 PROCEDURE — 76818 FETAL BIOPHYS PROFILE W/NST: CPT | Mod: 26

## 2023-04-20 PROCEDURE — ZZZZZ: CPT

## 2023-04-20 PROCEDURE — 76814 OB US NUCHAL MEAS ADD-ON: CPT

## 2023-04-20 PROCEDURE — 99214 OFFICE O/P EST MOD 30 MIN: CPT | Mod: 25

## 2023-04-20 PROCEDURE — 81002 URINALYSIS NONAUTO W/O SCOPE: CPT

## 2023-04-20 PROCEDURE — 76805 OB US >/= 14 WKS SNGL FETUS: CPT

## 2023-04-20 PROCEDURE — 99214 OFFICE O/P EST MOD 30 MIN: CPT

## 2023-04-20 RX ORDER — VALACYCLOVIR 1 G/1
1 TABLET, FILM COATED ORAL DAILY
Qty: 14 | Refills: 0 | Status: ACTIVE | COMMUNITY
Start: 2023-04-20 | End: 1900-01-01

## 2023-04-21 DIAGNOSIS — O34.219 MATERNAL CARE FOR UNSPECIFIED TYPE SCAR FROM PREVIOUS CESAREAN DELIVERY: ICD-10-CM

## 2023-04-21 DIAGNOSIS — O44.03 COMPLETE PLACENTA PREVIA NOS OR WITHOUT HEMORRHAGE, THIRD TRIMESTER: ICD-10-CM

## 2023-04-21 DIAGNOSIS — Z3A.34 34 WEEKS GESTATION OF PREGNANCY: ICD-10-CM

## 2023-04-21 DIAGNOSIS — O09.523 SUPERVISION OF ELDERLY MULTIGRAVIDA, THIRD TRIMESTER: ICD-10-CM

## 2023-04-21 DIAGNOSIS — Z3A.35 35 WEEKS GESTATION OF PREGNANCY: ICD-10-CM

## 2023-04-21 DIAGNOSIS — O99.213 OBESITY COMPLICATING PREGNANCY, THIRD TRIMESTER: ICD-10-CM

## 2023-04-24 ENCOUNTER — APPOINTMENT (OUTPATIENT)
Dept: ANTEPARTUM | Facility: CLINIC | Age: 33
End: 2023-04-24
Payer: MEDICAID

## 2023-04-24 ENCOUNTER — OUTPATIENT (OUTPATIENT)
Dept: OUTPATIENT SERVICES | Facility: HOSPITAL | Age: 33
LOS: 1 days | End: 2023-04-24
Payer: MEDICAID

## 2023-04-24 ENCOUNTER — ASOB RESULT (OUTPATIENT)
Age: 33
End: 2023-04-24

## 2023-04-24 VITALS — HEART RATE: 86 BPM | DIASTOLIC BLOOD PRESSURE: 75 MMHG | SYSTOLIC BLOOD PRESSURE: 120 MMHG

## 2023-04-24 DIAGNOSIS — Z98.890 OTHER SPECIFIED POSTPROCEDURAL STATES: Chronic | ICD-10-CM

## 2023-04-24 DIAGNOSIS — Z34.90 ENCOUNTER FOR SUPERVISION OF NORMAL PREGNANCY, UNSPECIFIED, UNSPECIFIED TRIMESTER: ICD-10-CM

## 2023-04-24 PROCEDURE — 76818 FETAL BIOPHYS PROFILE W/NST: CPT

## 2023-04-24 PROCEDURE — ZZZZZ: CPT

## 2023-04-24 PROCEDURE — 76818 FETAL BIOPHYS PROFILE W/NST: CPT | Mod: 26

## 2023-04-27 ENCOUNTER — NON-APPOINTMENT (OUTPATIENT)
Age: 33
End: 2023-04-27

## 2023-04-27 ENCOUNTER — APPOINTMENT (OUTPATIENT)
Dept: ANTEPARTUM | Facility: CLINIC | Age: 33
End: 2023-04-27

## 2023-04-28 ENCOUNTER — TRANSCRIPTION ENCOUNTER (OUTPATIENT)
Age: 33
End: 2023-04-28

## 2023-04-28 ENCOUNTER — RESULT REVIEW (OUTPATIENT)
Age: 33
End: 2023-04-28

## 2023-04-28 ENCOUNTER — INPATIENT (INPATIENT)
Facility: HOSPITAL | Age: 33
LOS: 3 days | Discharge: HOME | DRG: 540 | End: 2023-05-02
Attending: STUDENT IN AN ORGANIZED HEALTH CARE EDUCATION/TRAINING PROGRAM | Admitting: STUDENT IN AN ORGANIZED HEALTH CARE EDUCATION/TRAINING PROGRAM
Payer: MEDICAID

## 2023-04-28 ENCOUNTER — NON-APPOINTMENT (OUTPATIENT)
Age: 33
End: 2023-04-28

## 2023-04-28 DIAGNOSIS — O43.213 PLACENTA ACCRETA, THIRD TRIMESTER: ICD-10-CM

## 2023-04-28 DIAGNOSIS — Z3A.32 32 WEEKS GESTATION OF PREGNANCY: ICD-10-CM

## 2023-04-28 DIAGNOSIS — Z79.631 LONG TERM (CURRENT) USE OF ANTIMETABOLITE AGENT: ICD-10-CM

## 2023-04-28 DIAGNOSIS — O40.3XX0 POLYHYDRAMNIOS, THIRD TRIMESTER, NOT APPLICABLE OR UNSPECIFIED: ICD-10-CM

## 2023-04-28 DIAGNOSIS — Z98.890 OTHER SPECIFIED POSTPROCEDURAL STATES: Chronic | ICD-10-CM

## 2023-04-28 DIAGNOSIS — O44.03 COMPLETE PLACENTA PREVIA NOS OR WITHOUT HEMORRHAGE, THIRD TRIMESTER: ICD-10-CM

## 2023-04-28 DIAGNOSIS — Z88.8 ALLERGY STATUS TO OTHER DRUGS, MEDICAMENTS AND BIOLOGICAL SUBSTANCES: ICD-10-CM

## 2023-04-28 DIAGNOSIS — O99.213 OBESITY COMPLICATING PREGNANCY, THIRD TRIMESTER: ICD-10-CM

## 2023-04-28 DIAGNOSIS — Z3A.36 36 WEEKS GESTATION OF PREGNANCY: ICD-10-CM

## 2023-04-28 DIAGNOSIS — O23.593 INFECTION OF OTHER PART OF GENITAL TRACT IN PREGNANCY, THIRD TRIMESTER: ICD-10-CM

## 2023-04-28 DIAGNOSIS — O10.919 UNSPECIFIED PRE-EXISTING HYPERTENSION COMPLICATING PREGNANCY, UNSPECIFIED TRIMESTER: ICD-10-CM

## 2023-04-28 DIAGNOSIS — A60.09 HERPESVIRAL INFECTION OF OTHER UROGENITAL TRACT: ICD-10-CM

## 2023-04-28 DIAGNOSIS — O09.529 SUPERVISION OF ELDERLY MULTIGRAVIDA, UNSPECIFIED TRIMESTER: ICD-10-CM

## 2023-04-28 DIAGNOSIS — O09.523 SUPERVISION OF ELDERLY MULTIGRAVIDA, THIRD TRIMESTER: ICD-10-CM

## 2023-04-28 DIAGNOSIS — Z3A.35 35 WEEKS GESTATION OF PREGNANCY: ICD-10-CM

## 2023-04-28 DIAGNOSIS — O34.211 MATERNAL CARE FOR LOW TRANSVERSE SCAR FROM PREVIOUS CESAREAN DELIVERY: ICD-10-CM

## 2023-04-28 DIAGNOSIS — O34.219 MATERNAL CARE FOR UNSPECIFIED TYPE SCAR FROM PREVIOUS CESAREAN DELIVERY: ICD-10-CM

## 2023-04-28 DIAGNOSIS — O34.218 MATERNAL CARE FOR OTHER TYPE SCAR FROM PREVIOUS CESAREAN DELIVERY: ICD-10-CM

## 2023-04-28 DIAGNOSIS — O26.893 OTHER SPECIFIED PREGNANCY RELATED CONDITIONS, THIRD TRIMESTER: ICD-10-CM

## 2023-04-28 DIAGNOSIS — Z87.51 PERSONAL HISTORY OF PRE-TERM LABOR: ICD-10-CM

## 2023-04-28 DIAGNOSIS — Z28.09 IMMUNIZATION NOT CARRIED OUT BECAUSE OF OTHER CONTRAINDICATION: ICD-10-CM

## 2023-04-28 LAB
ALBUMIN SERPL ELPH-MCNC: 2.7 G/DL — LOW (ref 3.5–5.2)
ALBUMIN SERPL ELPH-MCNC: 3.6 G/DL — SIGNIFICANT CHANGE UP (ref 3.5–5.2)
ALP SERPL-CCNC: 122 U/L — HIGH (ref 30–115)
ALP SERPL-CCNC: 92 U/L — SIGNIFICANT CHANGE UP (ref 30–115)
ALT FLD-CCNC: <5 U/L — SIGNIFICANT CHANGE UP (ref 0–41)
ALT FLD-CCNC: <5 U/L — SIGNIFICANT CHANGE UP (ref 0–41)
AMPHET UR-MCNC: NEGATIVE — SIGNIFICANT CHANGE UP
ANION GAP SERPL CALC-SCNC: 11 MMOL/L — SIGNIFICANT CHANGE UP (ref 7–14)
ANION GAP SERPL CALC-SCNC: 13 MMOL/L — SIGNIFICANT CHANGE UP (ref 7–14)
APPEARANCE UR: ABNORMAL
APPEARANCE UR: CLEAR — SIGNIFICANT CHANGE UP
APTT BLD: 27.2 SEC — SIGNIFICANT CHANGE UP (ref 27–39.2)
APTT BLD: 31.6 SEC — SIGNIFICANT CHANGE UP (ref 27–39.2)
AST SERPL-CCNC: 10 U/L — SIGNIFICANT CHANGE UP (ref 0–41)
AST SERPL-CCNC: 9 U/L — SIGNIFICANT CHANGE UP (ref 0–41)
BACTERIA # UR AUTO: ABNORMAL
BACTERIA # UR AUTO: NEGATIVE — SIGNIFICANT CHANGE UP
BARBITURATES UR SCN-MCNC: NEGATIVE — SIGNIFICANT CHANGE UP
BASOPHILS # BLD AUTO: 0.05 K/UL — SIGNIFICANT CHANGE UP (ref 0–0.2)
BASOPHILS # BLD AUTO: 0.06 K/UL — SIGNIFICANT CHANGE UP (ref 0–0.2)
BASOPHILS NFR BLD AUTO: 0.4 % — SIGNIFICANT CHANGE UP (ref 0–1)
BASOPHILS NFR BLD AUTO: 0.5 % — SIGNIFICANT CHANGE UP (ref 0–1)
BENZODIAZ UR-MCNC: NEGATIVE — SIGNIFICANT CHANGE UP
BILIRUB SERPL-MCNC: 0.2 MG/DL — SIGNIFICANT CHANGE UP (ref 0.2–1.2)
BILIRUB SERPL-MCNC: 0.3 MG/DL — SIGNIFICANT CHANGE UP (ref 0.2–1.2)
BILIRUB UR-MCNC: NEGATIVE — SIGNIFICANT CHANGE UP
BILIRUB UR-MCNC: NEGATIVE — SIGNIFICANT CHANGE UP
BLD GP AB SCN SERPL QL: SIGNIFICANT CHANGE UP
BUN SERPL-MCNC: 7 MG/DL — LOW (ref 10–20)
BUN SERPL-MCNC: 9 MG/DL — LOW (ref 10–20)
BUPRENORPHINE SCREEN, URINE RESULT: NEGATIVE — SIGNIFICANT CHANGE UP
CALCIUM SERPL-MCNC: 8.2 MG/DL — LOW (ref 8.4–10.5)
CALCIUM SERPL-MCNC: 9.2 MG/DL — SIGNIFICANT CHANGE UP (ref 8.4–10.5)
CHLORIDE SERPL-SCNC: 100 MMOL/L — SIGNIFICANT CHANGE UP (ref 98–110)
CHLORIDE SERPL-SCNC: 107 MMOL/L — SIGNIFICANT CHANGE UP (ref 98–110)
CO2 SERPL-SCNC: 19 MMOL/L — SIGNIFICANT CHANGE UP (ref 17–32)
CO2 SERPL-SCNC: 21 MMOL/L — SIGNIFICANT CHANGE UP (ref 17–32)
COCAINE METAB.OTHER UR-MCNC: NEGATIVE — SIGNIFICANT CHANGE UP
COLOR SPEC: YELLOW — SIGNIFICANT CHANGE UP
COLOR SPEC: YELLOW — SIGNIFICANT CHANGE UP
CREAT SERPL-MCNC: 0.5 MG/DL — LOW (ref 0.7–1.5)
CREAT SERPL-MCNC: <0.5 MG/DL — LOW (ref 0.7–1.5)
DIFF PNL FLD: NEGATIVE — SIGNIFICANT CHANGE UP
DIFF PNL FLD: NEGATIVE — SIGNIFICANT CHANGE UP
EGFR: 125 ML/MIN/1.73M2 — SIGNIFICANT CHANGE UP
EGFR: 142 ML/MIN/1.73M2 — SIGNIFICANT CHANGE UP
EOSINOPHIL # BLD AUTO: 0.08 K/UL — SIGNIFICANT CHANGE UP (ref 0–0.7)
EOSINOPHIL # BLD AUTO: 0.09 K/UL — SIGNIFICANT CHANGE UP (ref 0–0.7)
EOSINOPHIL NFR BLD AUTO: 0.6 % — SIGNIFICANT CHANGE UP (ref 0–8)
EOSINOPHIL NFR BLD AUTO: 0.8 % — SIGNIFICANT CHANGE UP (ref 0–8)
EPI CELLS # UR: 14 /HPF — HIGH (ref 0–5)
EPI CELLS # UR: 9 /HPF — HIGH (ref 0–5)
FENTANYL UR QL: NEGATIVE — SIGNIFICANT CHANGE UP
FIBRINOGEN PPP-MCNC: 538 MG/DL — SIGNIFICANT CHANGE UP (ref 204.4–570.6)
FIBRINOGEN PPP-MCNC: 689 MG/DL — HIGH (ref 204.4–570.6)
GLUCOSE SERPL-MCNC: 104 MG/DL — HIGH (ref 70–99)
GLUCOSE SERPL-MCNC: 96 MG/DL — SIGNIFICANT CHANGE UP (ref 70–99)
GLUCOSE UR QL: NEGATIVE — SIGNIFICANT CHANGE UP
GLUCOSE UR QL: NEGATIVE — SIGNIFICANT CHANGE UP
HCT VFR BLD CALC: 27.4 % — LOW (ref 37–47)
HCT VFR BLD CALC: 32.1 % — LOW (ref 37–47)
HGB BLD-MCNC: 10.3 G/DL — LOW (ref 12–16)
HGB BLD-MCNC: 8.9 G/DL — LOW (ref 12–16)
HIV 1 & 2 AB SERPL IA.RAPID: SIGNIFICANT CHANGE UP
HYALINE CASTS # UR AUTO: 4 /LPF — SIGNIFICANT CHANGE UP (ref 0–7)
HYALINE CASTS # UR AUTO: 4 /LPF — SIGNIFICANT CHANGE UP (ref 0–7)
IMM GRANULOCYTES NFR BLD AUTO: 1.5 % — HIGH (ref 0.1–0.3)
IMM GRANULOCYTES NFR BLD AUTO: 1.8 % — HIGH (ref 0.1–0.3)
INR BLD: 0.98 RATIO — SIGNIFICANT CHANGE UP (ref 0.65–1.3)
INR BLD: 1.08 RATIO — SIGNIFICANT CHANGE UP (ref 0.65–1.3)
KETONES UR-MCNC: SIGNIFICANT CHANGE UP
KETONES UR-MCNC: SIGNIFICANT CHANGE UP
L&D DRUG SCREEN, URINE: SIGNIFICANT CHANGE UP
LDH SERPL L TO P-CCNC: 160 — SIGNIFICANT CHANGE UP (ref 50–242)
LEUKOCYTE ESTERASE UR-ACNC: NEGATIVE — SIGNIFICANT CHANGE UP
LEUKOCYTE ESTERASE UR-ACNC: NEGATIVE — SIGNIFICANT CHANGE UP
LYMPHOCYTES # BLD AUTO: 1.62 K/UL — SIGNIFICANT CHANGE UP (ref 1.2–3.4)
LYMPHOCYTES # BLD AUTO: 1.73 K/UL — SIGNIFICANT CHANGE UP (ref 1.2–3.4)
LYMPHOCYTES # BLD AUTO: 11.6 % — LOW (ref 20.5–51.1)
LYMPHOCYTES # BLD AUTO: 15.3 % — LOW (ref 20.5–51.1)
MCHC RBC-ENTMCNC: 27.2 PG — SIGNIFICANT CHANGE UP (ref 27–31)
MCHC RBC-ENTMCNC: 27.8 PG — SIGNIFICANT CHANGE UP (ref 27–31)
MCHC RBC-ENTMCNC: 32.1 G/DL — SIGNIFICANT CHANGE UP (ref 32–37)
MCHC RBC-ENTMCNC: 32.5 G/DL — SIGNIFICANT CHANGE UP (ref 32–37)
MCV RBC AUTO: 84.7 FL — SIGNIFICANT CHANGE UP (ref 81–99)
MCV RBC AUTO: 85.6 FL — SIGNIFICANT CHANGE UP (ref 81–99)
METHADONE UR-MCNC: NEGATIVE — SIGNIFICANT CHANGE UP
MONOCYTES # BLD AUTO: 0.64 K/UL — HIGH (ref 0.1–0.6)
MONOCYTES # BLD AUTO: 0.69 K/UL — HIGH (ref 0.1–0.6)
MONOCYTES NFR BLD AUTO: 4.9 % — SIGNIFICANT CHANGE UP (ref 1.7–9.3)
MONOCYTES NFR BLD AUTO: 5.6 % — SIGNIFICANT CHANGE UP (ref 1.7–9.3)
NEUTROPHILS # BLD AUTO: 11.3 K/UL — HIGH (ref 1.4–6.5)
NEUTROPHILS # BLD AUTO: 8.61 K/UL — HIGH (ref 1.4–6.5)
NEUTROPHILS NFR BLD AUTO: 76 % — HIGH (ref 42.2–75.2)
NEUTROPHILS NFR BLD AUTO: 81 % — HIGH (ref 42.2–75.2)
NITRITE UR-MCNC: NEGATIVE — SIGNIFICANT CHANGE UP
NITRITE UR-MCNC: NEGATIVE — SIGNIFICANT CHANGE UP
NRBC # BLD: 0 /100 WBCS — SIGNIFICANT CHANGE UP (ref 0–0)
NRBC # BLD: 0 /100 WBCS — SIGNIFICANT CHANGE UP (ref 0–0)
OPIATES UR-MCNC: NEGATIVE — SIGNIFICANT CHANGE UP
OXYCODONE UR-MCNC: NEGATIVE — SIGNIFICANT CHANGE UP
PCP UR-MCNC: NEGATIVE — SIGNIFICANT CHANGE UP
PH UR: 6.5 — SIGNIFICANT CHANGE UP (ref 5–8)
PH UR: 6.5 — SIGNIFICANT CHANGE UP (ref 5–8)
PLATELET # BLD AUTO: 241 K/UL — SIGNIFICANT CHANGE UP (ref 130–400)
PLATELET # BLD AUTO: 296 K/UL — SIGNIFICANT CHANGE UP (ref 130–400)
PMV BLD: 10.3 FL — SIGNIFICANT CHANGE UP (ref 7.4–10.4)
PMV BLD: 10.3 FL — SIGNIFICANT CHANGE UP (ref 7.4–10.4)
POST UNIT NUMBER: SIGNIFICANT CHANGE UP
POTASSIUM SERPL-MCNC: 3.8 MMOL/L — SIGNIFICANT CHANGE UP (ref 3.5–5)
POTASSIUM SERPL-MCNC: 4.2 MMOL/L — SIGNIFICANT CHANGE UP (ref 3.5–5)
POTASSIUM SERPL-SCNC: 3.8 MMOL/L — SIGNIFICANT CHANGE UP (ref 3.5–5)
POTASSIUM SERPL-SCNC: 4.2 MMOL/L — SIGNIFICANT CHANGE UP (ref 3.5–5)
PRENATAL SYPHILIS TEST: SIGNIFICANT CHANGE UP
PROPOXYPHENE QUALITATIVE URINE RESULT: NEGATIVE — SIGNIFICANT CHANGE UP
PROT SERPL-MCNC: 4.9 G/DL — LOW (ref 6–8)
PROT SERPL-MCNC: 6.6 G/DL — SIGNIFICANT CHANGE UP (ref 6–8)
PROT UR-MCNC: ABNORMAL
PROT UR-MCNC: ABNORMAL
PROTHROM AB SERPL-ACNC: 11.2 SEC — SIGNIFICANT CHANGE UP (ref 9.95–12.87)
PROTHROM AB SERPL-ACNC: 12.3 SEC — SIGNIFICANT CHANGE UP (ref 9.95–12.87)
RBC # BLD: 3.2 M/UL — LOW (ref 4.2–5.4)
RBC # BLD: 3.79 M/UL — LOW (ref 4.2–5.4)
RBC # FLD: 13.4 % — SIGNIFICANT CHANGE UP (ref 11.5–14.5)
RBC # FLD: 13.7 % — SIGNIFICANT CHANGE UP (ref 11.5–14.5)
RBC CASTS # UR COMP ASSIST: 5 /HPF — HIGH (ref 0–4)
RBC CASTS # UR COMP ASSIST: 6 /HPF — HIGH (ref 0–4)
SODIUM SERPL-SCNC: 134 MMOL/L — LOW (ref 135–146)
SODIUM SERPL-SCNC: 137 MMOL/L — SIGNIFICANT CHANGE UP (ref 135–146)
SP GR SPEC: 1.03 — SIGNIFICANT CHANGE UP (ref 1.01–1.03)
SP GR SPEC: 1.03 — SIGNIFICANT CHANGE UP (ref 1.01–1.03)
URATE SERPL-MCNC: 4.1 MG/DL — SIGNIFICANT CHANGE UP (ref 2.5–7)
UROBILINOGEN FLD QL: SIGNIFICANT CHANGE UP
UROBILINOGEN FLD QL: SIGNIFICANT CHANGE UP
WBC # BLD: 11.33 K/UL — HIGH (ref 4.8–10.8)
WBC # BLD: 13.95 K/UL — HIGH (ref 4.8–10.8)
WBC # FLD AUTO: 11.33 K/UL — HIGH (ref 4.8–10.8)
WBC # FLD AUTO: 13.95 K/UL — HIGH (ref 4.8–10.8)
WBC UR QL: 2 /HPF — SIGNIFICANT CHANGE UP (ref 0–5)
WBC UR QL: 5 /HPF — SIGNIFICANT CHANGE UP (ref 0–5)

## 2023-04-28 PROCEDURE — 58700 REMOVAL OF FALLOPIAN TUBE: CPT

## 2023-04-28 PROCEDURE — 86078 PHYS BLOOD BANK SERV REACTJ: CPT

## 2023-04-28 PROCEDURE — 86850 RBC ANTIBODY SCREEN: CPT

## 2023-04-28 PROCEDURE — 59525 REMOVE UTERUS AFTER CESAREAN: CPT

## 2023-04-28 PROCEDURE — 83615 LACTATE (LD) (LDH) ENZYME: CPT

## 2023-04-28 PROCEDURE — 86900 BLOOD TYPING SEROLOGIC ABO: CPT

## 2023-04-28 PROCEDURE — 36430 TRANSFUSION BLD/BLD COMPNT: CPT

## 2023-04-28 PROCEDURE — P9040: CPT

## 2023-04-28 PROCEDURE — 86923 COMPATIBILITY TEST ELECTRIC: CPT

## 2023-04-28 PROCEDURE — 81001 URINALYSIS AUTO W/SCOPE: CPT

## 2023-04-28 PROCEDURE — 59050 FETAL MONITOR W/REPORT: CPT

## 2023-04-28 PROCEDURE — 84550 ASSAY OF BLOOD/URIC ACID: CPT

## 2023-04-28 PROCEDURE — 80307 DRUG TEST PRSMV CHEM ANLYZR: CPT

## 2023-04-28 PROCEDURE — 85610 PROTHROMBIN TIME: CPT

## 2023-04-28 PROCEDURE — 85730 THROMBOPLASTIN TIME PARTIAL: CPT

## 2023-04-28 PROCEDURE — 59514 CESAREAN DELIVERY ONLY: CPT | Mod: U7

## 2023-04-28 PROCEDURE — 88307 TISSUE EXAM BY PATHOLOGIST: CPT | Mod: 26

## 2023-04-28 PROCEDURE — 84156 ASSAY OF PROTEIN URINE: CPT

## 2023-04-28 PROCEDURE — 86703 HIV-1/HIV-2 1 RESULT ANTBDY: CPT

## 2023-04-28 PROCEDURE — 59025 FETAL NON-STRESS TEST: CPT

## 2023-04-28 PROCEDURE — 82570 ASSAY OF URINE CREATININE: CPT

## 2023-04-28 PROCEDURE — 80053 COMPREHEN METABOLIC PANEL: CPT

## 2023-04-28 PROCEDURE — 88307 TISSUE EXAM BY PATHOLOGIST: CPT

## 2023-04-28 PROCEDURE — 86592 SYPHILIS TEST NON-TREP QUAL: CPT

## 2023-04-28 PROCEDURE — 85025 COMPLETE CBC W/AUTO DIFF WBC: CPT

## 2023-04-28 PROCEDURE — 36415 COLL VENOUS BLD VENIPUNCTURE: CPT

## 2023-04-28 PROCEDURE — 85384 FIBRINOGEN ACTIVITY: CPT

## 2023-04-28 PROCEDURE — 86901 BLOOD TYPING SEROLOGIC RH(D): CPT

## 2023-04-28 PROCEDURE — 80354 DRUG SCREENING FENTANYL: CPT

## 2023-04-28 RX ORDER — TETANUS TOXOID, REDUCED DIPHTHERIA TOXOID AND ACELLULAR PERTUSSIS VACCINE, ADSORBED 5; 2.5; 8; 8; 2.5 [IU]/.5ML; [IU]/.5ML; UG/.5ML; UG/.5ML; UG/.5ML
0.5 SUSPENSION INTRAMUSCULAR ONCE
Refills: 0 | Status: DISCONTINUED | OUTPATIENT
Start: 2023-04-28 | End: 2023-05-02

## 2023-04-28 RX ORDER — CEFAZOLIN SODIUM 1 G
2000 VIAL (EA) INJECTION ONCE
Refills: 0 | Status: COMPLETED | OUTPATIENT
Start: 2023-04-28 | End: 2023-04-28

## 2023-04-28 RX ORDER — OXYTOCIN 10 UNIT/ML
333.33 VIAL (ML) INJECTION
Qty: 20 | Refills: 0 | Status: DISCONTINUED | OUTPATIENT
Start: 2023-04-28 | End: 2023-05-02

## 2023-04-28 RX ORDER — OXYCODONE HYDROCHLORIDE 5 MG/1
5 TABLET ORAL
Refills: 0 | Status: DISCONTINUED | OUTPATIENT
Start: 2023-04-28 | End: 2023-05-02

## 2023-04-28 RX ORDER — TRANEXAMIC ACID 100 MG/ML
1000 INJECTION, SOLUTION INTRAVENOUS ONCE
Refills: 0 | Status: DISCONTINUED | OUTPATIENT
Start: 2023-04-28 | End: 2023-04-28

## 2023-04-28 RX ORDER — DIPHENHYDRAMINE HCL 50 MG
25 CAPSULE ORAL EVERY 6 HOURS
Refills: 0 | Status: DISCONTINUED | OUTPATIENT
Start: 2023-04-28 | End: 2023-05-02

## 2023-04-28 RX ORDER — SENNA PLUS 8.6 MG/1
2 TABLET ORAL AT BEDTIME
Refills: 0 | Status: DISCONTINUED | OUTPATIENT
Start: 2023-04-28 | End: 2023-05-02

## 2023-04-28 RX ORDER — APREPITANT 80 MG/1
40 CAPSULE ORAL ONCE
Refills: 0 | Status: COMPLETED | OUTPATIENT
Start: 2023-04-28 | End: 2023-04-28

## 2023-04-28 RX ORDER — SODIUM CHLORIDE 9 MG/ML
1000 INJECTION, SOLUTION INTRAVENOUS
Refills: 0 | Status: DISCONTINUED | OUTPATIENT
Start: 2023-04-28 | End: 2023-04-28

## 2023-04-28 RX ORDER — OXYCODONE HYDROCHLORIDE 5 MG/1
5 TABLET ORAL ONCE
Refills: 0 | Status: DISCONTINUED | OUTPATIENT
Start: 2023-04-28 | End: 2023-05-02

## 2023-04-28 RX ORDER — SODIUM CHLORIDE 9 MG/ML
1000 INJECTION, SOLUTION INTRAVENOUS
Refills: 0 | Status: DISCONTINUED | OUTPATIENT
Start: 2023-04-28 | End: 2023-04-29

## 2023-04-28 RX ORDER — MORPHINE SULFATE 50 MG/1
0.1 CAPSULE, EXTENDED RELEASE ORAL ONCE
Refills: 0 | Status: DISCONTINUED | OUTPATIENT
Start: 2023-04-28 | End: 2023-05-02

## 2023-04-28 RX ORDER — SIMETHICONE 80 MG/1
80 TABLET, CHEWABLE ORAL EVERY 4 HOURS
Refills: 0 | Status: DISCONTINUED | OUTPATIENT
Start: 2023-04-28 | End: 2023-05-02

## 2023-04-28 RX ORDER — LANOLIN
1 OINTMENT (GRAM) TOPICAL EVERY 6 HOURS
Refills: 0 | Status: DISCONTINUED | OUTPATIENT
Start: 2023-04-28 | End: 2023-05-02

## 2023-04-28 RX ORDER — DEXAMETHASONE 0.5 MG/5ML
4 ELIXIR ORAL EVERY 6 HOURS
Refills: 0 | Status: DISCONTINUED | OUTPATIENT
Start: 2023-04-28 | End: 2023-05-02

## 2023-04-28 RX ORDER — MAGNESIUM HYDROXIDE 400 MG/1
30 TABLET, CHEWABLE ORAL
Refills: 0 | Status: DISCONTINUED | OUTPATIENT
Start: 2023-04-28 | End: 2023-05-02

## 2023-04-28 RX ORDER — CITRIC ACID/SODIUM CITRATE 300-500 MG
30 SOLUTION, ORAL ORAL ONCE
Refills: 0 | Status: DISCONTINUED | OUTPATIENT
Start: 2023-04-28 | End: 2023-04-29

## 2023-04-28 RX ORDER — FAMOTIDINE 10 MG/ML
20 INJECTION INTRAVENOUS ONCE
Refills: 0 | Status: DISCONTINUED | OUTPATIENT
Start: 2023-04-28 | End: 2023-04-28

## 2023-04-28 RX ORDER — NALOXONE HYDROCHLORIDE 4 MG/.1ML
0.1 SPRAY NASAL
Refills: 0 | Status: DISCONTINUED | OUTPATIENT
Start: 2023-04-28 | End: 2023-05-02

## 2023-04-28 RX ORDER — SODIUM CHLORIDE 9 MG/ML
1000 INJECTION, SOLUTION INTRAVENOUS ONCE
Refills: 0 | Status: DISCONTINUED | OUTPATIENT
Start: 2023-04-28 | End: 2023-04-28

## 2023-04-28 RX ORDER — ONDANSETRON 8 MG/1
4 TABLET, FILM COATED ORAL EVERY 6 HOURS
Refills: 0 | Status: DISCONTINUED | OUTPATIENT
Start: 2023-04-28 | End: 2023-05-02

## 2023-04-28 RX ORDER — IBUPROFEN 200 MG
600 TABLET ORAL EVERY 6 HOURS
Refills: 0 | Status: COMPLETED | OUTPATIENT
Start: 2023-04-28 | End: 2024-03-26

## 2023-04-28 RX ORDER — ACETAMINOPHEN 500 MG
975 TABLET ORAL
Refills: 0 | Status: DISCONTINUED | OUTPATIENT
Start: 2023-04-28 | End: 2023-05-02

## 2023-04-28 RX ADMIN — Medication 975 MILLIGRAM(S): at 21:17

## 2023-04-28 RX ADMIN — APREPITANT 40 MILLIGRAM(S): 80 CAPSULE ORAL at 15:09

## 2023-04-28 RX ADMIN — SENNA PLUS 2 TABLET(S): 8.6 TABLET ORAL at 22:32

## 2023-04-28 RX ADMIN — SIMETHICONE 80 MILLIGRAM(S): 80 TABLET, CHEWABLE ORAL at 22:32

## 2023-04-28 RX ADMIN — Medication 975 MILLIGRAM(S): at 21:47

## 2023-04-28 RX ADMIN — Medication 100 MILLIGRAM(S): at 15:30

## 2023-04-28 NOTE — OB PROVIDER H&P - ATTENDING COMMENTS
Patient was thoroughly counseled and understands that a  delivery is recommended at this time. Risks of  CS discussed and include but not limited to hemorrhage, DVT, infection, damage to surrounding structures including bowel/ bladder/ ureter. Patient willing to receive blood in emergencies. Patient aware of risk of hysterectomy (<1000) in event of persistent uterine hemorrhage not responding to resuscitative measures.  Declining contraception at this time.

## 2023-04-28 NOTE — BRIEF OPERATIVE NOTE - NSICDXBRIEFPROCEDURE_GEN_ALL_CORE_FT
PROCEDURES:   section with total abdominal hysterectomy 2023 18:51:01  Arelis Palma  Bilateral salpingectomy 2023 18:51:12  Arelis Palma

## 2023-04-28 NOTE — OB PROVIDER H&P - NSRISKFACTORSDETA_OBGYN_ALL_OB
Prepregnancy Hypertension/Previous  <37 weeks/Other Poor Pregnancy Outcome/Referral for High Risk Care

## 2023-04-28 NOTE — OB PROVIDER H&P - NS_OBGYNHISTORY_OBGYN_ALL_OB_FT
OB Hx: : FT  x1, 4 month girl infant death (patient did not desire to offer further details). When asked if it was an accident she said yes.   G2: FT  x1 girl (12 years old now).   G3: 2010: FT LTCS x1, elective for suspected macrosomia, boy, 9 lbs 12 oz.   G4: IUFD @5 months (per patient), s/p D&E   G5: FT LTCS x1, elective repeat   G6: 32w LTCS for twin pregnancy, complicated by pre-eclampsia at 8 months   G7: ectopic pregnancy on 2022 s/p mtx treatment   G8 current   SAB x1 with D&C    GYN Hx:  h/o abnormal pap with HPV +, s/p LEEP  h/o HSV with genital lesions OB Hx: : FT  x1, 4 month girl infant death (patient did not desire to offer further details). When asked if it was an accident she said yes.   G2: FT  x1 girl (12 years old now).   G3: 2010: FT LTCS x1, elective for suspected macrosomia, boy, 9 lbs 12 oz.   G4: IUFD @5 months (per patient), s/p D&E   G5: FT LTCS x1, elective repeat   G6: 32w LTCS for twin pregnancy, complicated by pre-eclampsia at 8 months   G7: ectopic pregnancy on 2022 s/p mtx treatment   G8 current   SAB x1 with D&C    GYN Hx:  h/o abnormal pap with HPV +, s/p LEEP  Denies h/o fibroids, cysts, STI  h/o HSV with genital lesions

## 2023-04-28 NOTE — OB RN INTRAOPERATIVE NOTE - NSSELHIDDEN_OBGYN_ALL_OB_FT
[NS_DeliveryAttending1_OBGYN_ALL_OB_FT:[id number]=],[NS_DeliveryRN_OBGYN_ALL_OB_FT:[email or web address]=] [NS_DeliveryAttending1_OBGYN_ALL_OB_FT:[id number]=],[NS_DeliveryRN_OBGYN_ALL_OB_FT:[email or web address]=],[NS_DeliveryAttending2_OBGYN_ALL_OB_FT:[email or web address]=]

## 2023-04-28 NOTE — CHART NOTE - NSCHARTNOTEFT_GEN_A_CORE
PACU ANESTHESIA ADMISSION NOTE      Procedure:  section with total abdominal hysterectomy    Bilateral salpingectomy      Post op diagnosis:  Total placenta previa    Chronic hypertension    Previous  section    Placenta accreta        __x__  Patent Airway    __x__  Full return of protective reflexes    __x__  Full recovery from anesthesia / back to baseline status    Vitals:  T(C): 36.8 (23 @ 02:05), Max: 36.9 (23 @ 13:33)  HR: 76 (23 @ 02:05) (48 - 95)  BP: 129/70 (23 @ 02:05) (96/54 - 155/81)  RR: 18 (23 @ 02:05) (18 - 18)  SpO2: 99% (23 @ 19:02) (97% - 99%)    Mental Status:  __x__ Awake   ___x__ Alert   _____ Drowsy   _____ Sedated    Nausea/Vomiting:  __x__ NO  ______Yes,   See Post - Op Orders          Pain Scale (0-10):  _____    Treatment: ____ None    __x__ See Post - Op/PCA Orders    Post - Operative Fluids:   ____ Oral   __x__ See Post - Op Orders    Plan: Discharge:   ____Home       _x____Floor     _____Critical Care    _____  Other:_________________    Comments: Patient had smooth intraoperative event, no anesthesia complication.

## 2023-04-28 NOTE — OB RN PATIENT PROFILE - FALL HARM RISK - UNIVERSAL INTERVENTIONS
Bed in lowest position, wheels locked, appropriate side rails in place/Call bell, personal items and telephone in reach/Instruct patient to call for assistance before getting out of bed or chair/Non-slip footwear when patient is out of bed/[place] to call system/Physically safe environment - no spills, clutter or unnecessary equipment/Purposeful Proactive Rounding/Room/bathroom lighting operational, light cord in reach

## 2023-04-28 NOTE — OB PROVIDER H&P - NSHPPHYSICALEXAM_GEN_ALL_CORE
Vital Signs Last 24 Hrs  T(C): 36.9 (28 Apr 2023 14:05), Max: 36.9 (28 Apr 2023 13:33)  T(F): 98.4 (28 Apr 2023 13:33), Max: 98.4 (28 Apr 2023 13:33)  HR: 94 (28 Apr 2023 14:05) (91 - 94)  BP: 130/64 (28 Apr 2023 14:05) (130/64 - 130/64)  RR: 18 (28 Apr 2023 14:05) (18 - 18)    Gen: NAD, sitting comfortably  Abd: Gravid, soft, NT, no palpable ctx  SVE: deferred  EFM: 145/mod/+accels  [place]: none  Sono: cephalic

## 2023-04-28 NOTE — OB RN DELIVERY SUMMARY - BABY A: VOID IN DELIVERY
"  CHIEF COMPLAINT   It was my pleasure to see Kimani Juarez who is a 72 year old male for follow-up of BPH with urinary obstruction.      HPI  Kimani Juarez is a very pleasant 72 year old male who presents with a history of Elevated PSA. He has previously been followed by Dr. Wright. Had PSA rise in the past to 7.8. Had MRI done 2018 demonstrating PIRADS 2 lesion and elected observation. More recently, had episode of acute prostatitis with PSA flare to 129 at that time.     Ultimately underwent TURP due to BPH with urinary obstruction with surgery on 10/8/2021. He returns today for post-op and reports doing well. Voiding without difficulty and feels he's emptying well.      PSA   8/27/2021 - 6.6  6/25/2021 - 9.40  1/2/2021 - 129 (prostatitis)  2/4/2019 - 7.80  8/9/2018 - 7.50  8/7/2017 - 6.40     MRI Prostate 8/14/2021  Size: 76 grams  IMPRESSION:  1. Based on the most suspicious abnormality, this exam is  characterized as PIRADS 2 - Clinically significant cancer is unlikely  to be present.  2. No suspicious adenopathy or evidence of pelvic metastases.    TURP 10/8/2021  Final Diagnosis   Prostate, transurethral resection:   -Benign stromal and glandular hyperplasia.  -Chronic with acute inflammation  - No evidence of malignancy       PHYSICAL EXAM  Patient is a 72 year old  male   Vitals: Blood pressure 130/70, height 1.778 m (5' 10\"), weight 79.4 kg (175 lb).  General Appearance Adult: Body mass index is 25.11 kg/m .  Alert, no acute distress, oriented  Lungs: no respiratory distress, or pursed lip breathing  Abdomen: soft, nontender, no organomegaly or masses  Back: no CVAT  Neuro: Alert, oriented, speech and mentation normal  Psych: affect and mood normal    ASSESSMENT and PLAN  72 year old male with history of elevated PSA and BPH with obstructive voiding symptoms. Regarding his PSA, this has chronically been elevated and now down to 6.6, which is lower than it's been over the past several years. He also has " only PIRADS 2 on his MRI. As such, it is reasonable to obaserve and will plan PSA in 6 months.    - Stop finasteride and oxybutynin  - Follow-up 6 months with PSA and PVR    Marky Jarrett MD  Urology  [facility]     yes

## 2023-04-28 NOTE — OB RN DELIVERY SUMMARY - NSSELHIDDEN_OBGYN_ALL_OB_FT
[NS_DeliveryAttending1_OBGYN_ALL_OB_FT:[id number]=],[NS_DeliveryRN_OBGYN_ALL_OB_FT:[email or web address]=]

## 2023-04-28 NOTE — BRIEF OPERATIVE NOTE - OPERATION/FINDINGS
Low transverse uterine incision. Extensive adhesions of omentum to anterior abdominal wall.  Male  delivered in vertex position, clear amniotic fluid, APGARs 9/9.  On attempt to remove placenta, bleeding visualized and adherent portion of placenta palpated.  Attempted gentle traction and fundal massage for approximately 10 minutes without ability to deliver placenta.  Diagnosis of placenta accreta spectrum made and decision was made to proceed with hysterectomy/BS. Low transverse uterine incision. Extensive adhesions of omentum to anterior abdominal wall.  Male  delivered in vertex position, clear amniotic fluid, APGARs 9/9.  On attempt to remove placenta, bleeding visualized and adherent portion of placenta palpated on left posterior lateral aspect of uterus.  Attempted gentle traction and fundal massage for approximately 10 minutes without ability to deliver placenta.  Diagnosis of placenta accreta spectrum made and decision was made to proceed with RODRIGUE hysterectomy/BS.

## 2023-04-28 NOTE — OB PROVIDER H&P - HISTORY OF PRESENT ILLNESS
34 yo  @38w1d, VAISHALI 23 by first trimester sonogram, presents for scheduled LTCS #4, complicated by complete placenta previa. Pt reports +FM, denies LOF, VB or CTX.     Pregnancy complicated by:  -complete placenta previa  -chronic HTN -no medications, taking ASA daily, not monitoring home BPs  -h/o preeclampsia necissitating delivery of twins @32 weeks  -LTCS x3  -h/o IUFD @5 months  -exposure to methotrexate in early pregnancy- pt did not complete fetal echo   -polyhydramnios - MVP 8.29 @35 weeks   34 yo  @38w1d, VAISHALI 23 by first trimester sonogram, presents for scheduled LTCS #4, complicated by complete placenta previa. Pt reports +FM, denies LOF, VB or CTX. GBS unknown.     Pregnancy complicated by:  -complete placenta previa  -chronic HTN -no medications, taking ASA daily, reports irregularly measuring home BPs, usually 120-130s/60-70s  -h/o preeclampsia necessitating delivery of twins @32 weeks  -LTCS x3  -h/o IUFD @5 months  -exposure to methotrexate in early pregnancy- pt did not complete fetal echo   -polyhydramnios - MVP 8.29 @35 weeks  -h/o HSV with genital lesions, not on valtrex suppression 32 yo  @36w5d, VAISHALI 23 by first trimester sonogram, presents for scheduled LTCS #4, complicated by complete placenta previa. Pt reports +FM, denies LOF, VB or CTX. Denies headache, changes in vision, chest pain, SOB, RUQ/epigastric pain, N/V, fevers, chills, diarrhea, LE pain/swelling. GBS unknown.     Pregnancy complicated by:  -complete placenta previa  -chronic HTN -no medications, taking ASA daily, reports irregularly measuring home BPs, usually 120-130s/60-70s  -h/o preeclampsia necessitating delivery of twins @32 weeks  -LTCS x3  -h/o IUFD @5 months  -exposure to methotrexate in early pregnancy- pt did not complete fetal echo   -polyhydramnios - MVP 8.29 @35 weeks  -h/o HSV with genital lesions, not on valtrex suppression

## 2023-04-28 NOTE — BRIEF OPERATIVE NOTE - NSICDXBRIEFPOSTOP_GEN_ALL_CORE_FT
POST-OP DIAGNOSIS:  Chronic hypertension 2023 18:52:04  Arelis Palma  Previous  section 2023 18:52:13  Aerlis Palma  Total placenta previa 2023 18:51:59  Arelis Palma  Placenta accreta 2023 18:52:28  Arelis Palma

## 2023-04-28 NOTE — OB PROVIDER DELIVERY SUMMARY - NSCSDELIVATYPE_OBGYN_ALL_OB
AVSS. No S/S of pain. LS clear on RA. Small emesis x1, no stool, fair PO. Good UO. MIVF stopped. Mom present and attentive at bedside.    Pt arrived to floor around 0230 to room 6135, accompanied by her mother. VSS and afebrile, lung sounds clear. No signs/symptoms of pain. Emesis x2 with feeding. IVMF running at 20 mL/hr. Good UOP and stool. Hourly rounding complete, will continue to monitor.    VSS. No signs of pain or discomfort. Continues to have small emesis throughout the day and after feeds. Good PO intake. Good UOP. Stooling. Mother at bedside and was attentive to patient. Overnight request for nursing to feed and do cares so that mother can sleep. Continue to monitor.    Victoria has been eating well -  no emesis this shift. Voiding and stooling well. Mother is attentive at bedside. Family asking appropriate questions, Mother is anxious but eager to discharge to home. She and father both in agreement with plan. Discharge AVS reviewed with parents. All questions answered. Victoria was sent home in the care of both her parents.    Statement Selected Repeat

## 2023-04-28 NOTE — OB PROVIDER H&P - NSHPLABSRESULTS_GEN_ALL_CORE
Labs  HBsAG NR  Hep C NR  Rubella immune  Measles immune  varicella immune  RPR NR  O Pos, ab neg  GCT 89  NIPT low risk    Sono  1/19: @22w4d, breech, posterior previa-central, MVP: 5.21cm, EFW: 569gm, feet/ankles not visualized, EIF, otherwise WNL   2/16: @26w4d, vertex, posterior placenta previa, MVP 7.8cm, EFW 932g (32%), feet/ankles WNL   3/16: @30w4d, vertex, posterior complete previa, MVP 5.51cm, BPP 8/8, EFW 1645g (46%)  4/13 - EFW 2580g (60%), AC 90%, MVP 3.81,   4/17 - MVP 8.29

## 2023-04-28 NOTE — OB PROVIDER DELIVERY SUMMARY - NSPROVIDERDELIVERYNOTE_OBGYN_ALL_OB_FT
Please see brief op note and dictation for further details. Uncomplicated delivery of   Placenta failed to deliver spontaneously despite uterine massage and gentle traction  Discussion of findings was had with patient and she agreed to hysterectomy     Please see brief op note and dictation for further details.

## 2023-04-28 NOTE — BRIEF OPERATIVE NOTE - NSICDXBRIEFPREOP_GEN_ALL_CORE_FT
PRE-OP DIAGNOSIS:  Total placenta previa 2023 18:51:55  Arelis Palma  Chronic hypertension 2023 18:51:40  Arelis Palma  Previous  section 2023 18:51:32  Arelis Palma

## 2023-04-28 NOTE — OB PROVIDER DELIVERY SUMMARY - NSSELHIDDEN_OBGYN_ALL_OB_FT
[[email or web address]_OB_FT:[id number]],[NS_DeliveryRN_OBGYN_ALL_OB_FT:[id number]]

## 2023-04-28 NOTE — OB PROVIDER H&P - NSICDXPASTMEDICALHX_GEN_ALL_CORE_FT
PAST MEDICAL HISTORY:  Herpes simplex virus (HSV) infection     History of ectopic pregnancy     Hypertension     Preeclampsia

## 2023-04-28 NOTE — PRE-ANESTHESIA EVALUATION ADULT - NSANTHADDINFOFT_GEN_ALL_CORE
Thorough discussion of patient's history, as indicated above.  Discussed risks of epidural/spinal, including PDPH, inadequate analgesia occasionally requiring epidural catheter replacement/ general anesthesia, bleeding, infection and spinal cord injury. hypotension and nausea. Patient expressed understanding of these risks, signed informed consent and wishes to proceed

## 2023-04-28 NOTE — OB PROVIDER H&P - ASSESSMENT
A/P: 32 yo  @38w1d, GBS unknown, h/o LTCS x3, cHTN, h/o preeclampsia poor obstetrical history, polyhydramnios, methotrexate exposure in pregnancy, for LTCS #4 complicated by complete placenta previa  -Admit to L+D  -Monitor EFM and TOCO   -IVF  -Admission labs, PELs, Urprcr  -Pain control PRN  -NPO  -crossed 4u pRBC  -Ancef 2g  -anesthesia consult  -on call to OR  -Depo post partum     Dr. Palma and Dr. Vásquez to be aware A/P: 34 yo  @36w5d, GBS unknown, h/o LTCS x3, cHTN, h/o preeclampsia poor obstetrical history, polyhydramnios, methotrexate exposure in pregnancy, for LTCS #4 complicated by complete placenta previa  -Admit to L+D  -Monitor EFM and TOCO   -IVF  -Admission labs, PELs, Urprcr  -Pain control PRN  -NPO  -crossed 4u pRBC  -Ancef 2g  -anesthesia consult  -on call to OR  -Depo post partum     Dr. Palma and Dr. Vásquez to be aware A/P: 34 yo  @36w5d, GBS unknown, h/o LTCS x3, cHTN, h/o preeclampsia poor obstetrical history, polyhydramnios, methotrexate exposure in pregnancy, for LTCS #4 complicated by complete placenta previa  -Admit to L+D  -Monitor EFM and TOCO   -IVF  -Admission labs, PELs, Urprcr  -Pain control PRN  -NPO  -crossed 4u pRBC  -Ancef 2g  -anesthesia consult  -on call to OR  -Depo post partum     d/w Dr. Palma and Dr. Vásquez

## 2023-04-29 LAB
ALBUMIN SERPL ELPH-MCNC: 3.6 G/DL — SIGNIFICANT CHANGE UP (ref 3.5–5.2)
ALP SERPL-CCNC: 125 U/L — HIGH (ref 30–115)
ALT FLD-CCNC: <5 U/L — SIGNIFICANT CHANGE UP (ref 0–41)
ANION GAP SERPL CALC-SCNC: 14 MMOL/L — SIGNIFICANT CHANGE UP (ref 7–14)
APTT BLD: 29.7 SEC — SIGNIFICANT CHANGE UP (ref 27–39.2)
AST SERPL-CCNC: 15 U/L — SIGNIFICANT CHANGE UP (ref 0–41)
BASOPHILS # BLD AUTO: 0.04 K/UL — SIGNIFICANT CHANGE UP (ref 0–0.2)
BASOPHILS # BLD AUTO: 0.07 K/UL — SIGNIFICANT CHANGE UP (ref 0–0.2)
BASOPHILS NFR BLD AUTO: 0.1 % — SIGNIFICANT CHANGE UP (ref 0–1)
BASOPHILS NFR BLD AUTO: 0.2 % — SIGNIFICANT CHANGE UP (ref 0–1)
BILIRUB SERPL-MCNC: 1 MG/DL — SIGNIFICANT CHANGE UP (ref 0.2–1.2)
BUN SERPL-MCNC: 7 MG/DL — LOW (ref 10–20)
CALCIUM SERPL-MCNC: 9.1 MG/DL — SIGNIFICANT CHANGE UP (ref 8.4–10.5)
CHLORIDE SERPL-SCNC: 100 MMOL/L — SIGNIFICANT CHANGE UP (ref 98–110)
CO2 SERPL-SCNC: 20 MMOL/L — SIGNIFICANT CHANGE UP (ref 17–32)
CREAT ?TM UR-MCNC: 99 MG/DL — SIGNIFICANT CHANGE UP
CREAT SERPL-MCNC: 0.5 MG/DL — LOW (ref 0.7–1.5)
EGFR: 125 ML/MIN/1.73M2 — SIGNIFICANT CHANGE UP
EOSINOPHIL # BLD AUTO: 0 K/UL — SIGNIFICANT CHANGE UP (ref 0–0.7)
EOSINOPHIL # BLD AUTO: 0 K/UL — SIGNIFICANT CHANGE UP (ref 0–0.7)
EOSINOPHIL NFR BLD AUTO: 0 % — SIGNIFICANT CHANGE UP (ref 0–8)
EOSINOPHIL NFR BLD AUTO: 0 % — SIGNIFICANT CHANGE UP (ref 0–8)
FIBRINOGEN PPP-MCNC: 697 MG/DL — HIGH (ref 204.4–570.6)
GLUCOSE SERPL-MCNC: 148 MG/DL — HIGH (ref 70–99)
HCT VFR BLD CALC: 29.4 % — LOW (ref 37–47)
HCT VFR BLD CALC: 36.1 % — LOW (ref 37–47)
HGB BLD-MCNC: 11.7 G/DL — LOW (ref 12–16)
HGB BLD-MCNC: 9.7 G/DL — LOW (ref 12–16)
IMM GRANULOCYTES NFR BLD AUTO: 1.4 % — HIGH (ref 0.1–0.3)
IMM GRANULOCYTES NFR BLD AUTO: 1.9 % — HIGH (ref 0.1–0.3)
INR BLD: 0.99 RATIO — SIGNIFICANT CHANGE UP (ref 0.65–1.3)
LYMPHOCYTES # BLD AUTO: 1.31 K/UL — SIGNIFICANT CHANGE UP (ref 1.2–3.4)
LYMPHOCYTES # BLD AUTO: 1.4 K/UL — SIGNIFICANT CHANGE UP (ref 1.2–3.4)
LYMPHOCYTES # BLD AUTO: 4.3 % — LOW (ref 20.5–51.1)
LYMPHOCYTES # BLD AUTO: 5 % — LOW (ref 20.5–51.1)
MCHC RBC-ENTMCNC: 27.8 PG — SIGNIFICANT CHANGE UP (ref 27–31)
MCHC RBC-ENTMCNC: 27.9 PG — SIGNIFICANT CHANGE UP (ref 27–31)
MCHC RBC-ENTMCNC: 32.4 G/DL — SIGNIFICANT CHANGE UP (ref 32–37)
MCHC RBC-ENTMCNC: 33 G/DL — SIGNIFICANT CHANGE UP (ref 32–37)
MCV RBC AUTO: 84.2 FL — SIGNIFICANT CHANGE UP (ref 81–99)
MCV RBC AUTO: 86.2 FL — SIGNIFICANT CHANGE UP (ref 81–99)
MONOCYTES # BLD AUTO: 1.04 K/UL — HIGH (ref 0.1–0.6)
MONOCYTES # BLD AUTO: 1.92 K/UL — HIGH (ref 0.1–0.6)
MONOCYTES NFR BLD AUTO: 3.4 % — SIGNIFICANT CHANGE UP (ref 1.7–9.3)
MONOCYTES NFR BLD AUTO: 6.8 % — SIGNIFICANT CHANGE UP (ref 1.7–9.3)
NEUTROPHILS # BLD AUTO: 24.34 K/UL — HIGH (ref 1.4–6.5)
NEUTROPHILS # BLD AUTO: 27.63 K/UL — HIGH (ref 1.4–6.5)
NEUTROPHILS NFR BLD AUTO: 86.2 % — HIGH (ref 42.2–75.2)
NEUTROPHILS NFR BLD AUTO: 90.7 % — HIGH (ref 42.2–75.2)
NRBC # BLD: 0 /100 WBCS — SIGNIFICANT CHANGE UP (ref 0–0)
NRBC # BLD: 0 /100 WBCS — SIGNIFICANT CHANGE UP (ref 0–0)
PLATELET # BLD AUTO: 322 K/UL — SIGNIFICANT CHANGE UP (ref 130–400)
PLATELET # BLD AUTO: 336 K/UL — SIGNIFICANT CHANGE UP (ref 130–400)
PMV BLD: 10.6 FL — HIGH (ref 7.4–10.4)
PMV BLD: 10.7 FL — HIGH (ref 7.4–10.4)
POTASSIUM SERPL-MCNC: 4.3 MMOL/L — SIGNIFICANT CHANGE UP (ref 3.5–5)
POTASSIUM SERPL-SCNC: 4.3 MMOL/L — SIGNIFICANT CHANGE UP (ref 3.5–5)
PROT ?TM UR-MCNC: 19 MG/DLG/24H — SIGNIFICANT CHANGE UP
PROT SERPL-MCNC: 6.4 G/DL — SIGNIFICANT CHANGE UP (ref 6–8)
PROT/CREAT UR-RTO: 0.2 RATIO — SIGNIFICANT CHANGE UP (ref 0–0.2)
PROTHROM AB SERPL-ACNC: 11.3 SEC — SIGNIFICANT CHANGE UP (ref 9.95–12.87)
RBC # BLD: 3.49 M/UL — LOW (ref 4.2–5.4)
RBC # BLD: 4.19 M/UL — LOW (ref 4.2–5.4)
RBC # FLD: 13.4 % — SIGNIFICANT CHANGE UP (ref 11.5–14.5)
RBC # FLD: 13.5 % — SIGNIFICANT CHANGE UP (ref 11.5–14.5)
SODIUM SERPL-SCNC: 134 MMOL/L — LOW (ref 135–146)
WBC # BLD: 28.24 K/UL — HIGH (ref 4.8–10.8)
WBC # BLD: 30.48 K/UL — HIGH (ref 4.8–10.8)
WBC # FLD AUTO: 28.24 K/UL — HIGH (ref 4.8–10.8)
WBC # FLD AUTO: 30.48 K/UL — HIGH (ref 4.8–10.8)

## 2023-04-29 PROCEDURE — 99232 SBSQ HOSP IP/OBS MODERATE 35: CPT | Mod: 24

## 2023-04-29 RX ORDER — OXYCODONE HYDROCHLORIDE 5 MG/1
5 TABLET ORAL ONCE
Refills: 0 | Status: DISCONTINUED | OUTPATIENT
Start: 2023-04-29 | End: 2023-05-02

## 2023-04-29 RX ORDER — ENOXAPARIN SODIUM 100 MG/ML
40 INJECTION SUBCUTANEOUS EVERY 24 HOURS
Refills: 0 | Status: DISCONTINUED | OUTPATIENT
Start: 2023-04-29 | End: 2023-05-02

## 2023-04-29 RX ORDER — IBUPROFEN 200 MG
600 TABLET ORAL EVERY 6 HOURS
Refills: 0 | Status: DISCONTINUED | OUTPATIENT
Start: 2023-04-29 | End: 2023-05-02

## 2023-04-29 RX ORDER — SODIUM CHLORIDE 9 MG/ML
1000 INJECTION, SOLUTION INTRAVENOUS
Refills: 0 | Status: DISCONTINUED | OUTPATIENT
Start: 2023-04-29 | End: 2023-04-30

## 2023-04-29 RX ADMIN — Medication 975 MILLIGRAM(S): at 17:20

## 2023-04-29 RX ADMIN — ENOXAPARIN SODIUM 40 MILLIGRAM(S): 100 INJECTION SUBCUTANEOUS at 16:47

## 2023-04-29 RX ADMIN — SIMETHICONE 80 MILLIGRAM(S): 80 TABLET, CHEWABLE ORAL at 09:33

## 2023-04-29 RX ADMIN — SIMETHICONE 80 MILLIGRAM(S): 80 TABLET, CHEWABLE ORAL at 14:55

## 2023-04-29 RX ADMIN — SIMETHICONE 80 MILLIGRAM(S): 80 TABLET, CHEWABLE ORAL at 06:09

## 2023-04-29 RX ADMIN — Medication 600 MILLIGRAM(S): at 13:55

## 2023-04-29 RX ADMIN — Medication 975 MILLIGRAM(S): at 21:07

## 2023-04-29 RX ADMIN — Medication 975 MILLIGRAM(S): at 16:47

## 2023-04-29 RX ADMIN — Medication 600 MILLIGRAM(S): at 06:42

## 2023-04-29 RX ADMIN — SIMETHICONE 80 MILLIGRAM(S): 80 TABLET, CHEWABLE ORAL at 21:06

## 2023-04-29 RX ADMIN — Medication 600 MILLIGRAM(S): at 19:05

## 2023-04-29 RX ADMIN — Medication 975 MILLIGRAM(S): at 09:33

## 2023-04-29 RX ADMIN — Medication 975 MILLIGRAM(S): at 10:05

## 2023-04-29 RX ADMIN — Medication 600 MILLIGRAM(S): at 19:25

## 2023-04-29 RX ADMIN — Medication 600 MILLIGRAM(S): at 06:09

## 2023-04-29 RX ADMIN — SENNA PLUS 2 TABLET(S): 8.6 TABLET ORAL at 21:06

## 2023-04-29 RX ADMIN — Medication 600 MILLIGRAM(S): at 13:24

## 2023-04-29 RX ADMIN — SIMETHICONE 80 MILLIGRAM(S): 80 TABLET, CHEWABLE ORAL at 19:05

## 2023-04-29 RX ADMIN — Medication 975 MILLIGRAM(S): at 21:40

## 2023-04-29 NOTE — PROGRESS NOTE ADULT - ASSESSMENT
A/P: 34yo now P7 s/p -hysterectomy, BS for complete previa and placenta accreta, s/p 2 units pRBC and TXA x2, EBL 1800cc. POD#1 recovering well with stable labs and vitals    -  Pain control per routine  -  encourage ambulation  -  encourage incentive spirometry  -  PO hydration  -  can transition to regular diet  -  TOV 1530  -  DVT prophylaxis: ambulation, SCDs, Lovenox    Dr. Palma and Dr. Vásquez aware A/P: 34yo now P6 s/p -hysterectomy, BS for complete previa and placenta accreta, s/p 2 units pRBC and TXA x2, EBL 1800cc. POD#1 recovering well with stable labs and vitals    -  Pain control per routine  -  encourage ambulation  -  encourage incentive spirometry  -  PO hydration  -  can transition to regular diet  -  TOV 1530  -  DVT prophylaxis: ambulation, SCDs, Lovenox

## 2023-04-29 NOTE — PROGRESS NOTE ADULT - SUBJECTIVE AND OBJECTIVE BOX
PGY1 note  Chief Complaint: Post  section    Patient seen and examined. Pain well controlled at this time. No complaints at this time. Denies fever, chills, nausea, vomiting, chest pain, shortness of breath, severe abdominal pain, heavy vaginal bleeding. Patient is not ambulating. Passing flatus. Tolerating PO fluids. Burk in place draining clear urine.     PAST MEDICAL & SURGICAL HISTORY:  Hypertension  Preeclampsia  Herpes simplex virus (HSV) infection  History of ectopic pregnancy  Delivery by  section of full-term infant  History of D&C  dilation and evacuation  H/O cone biopsy of cervix    MEDICATIONS  (STANDING):  acetaminophen     Tablet .. 975 milliGRAM(s) Oral <User Schedule>  ibuprofen  Tablet. 600 milliGRAM(s) Oral every 6 hours  lactated ringers. 1000 milliLiter(s) (75 mL/Hr) IV Continuous <Continuous>  senna 2 Tablet(s) Oral at bedtime  simethicone 80 milliGRAM(s) Chew every 4 hours    MEDICATIONS  (PRN):  dexAMETHasone  Injectable 4 milliGRAM(s) IV Push every 6 hours PRN Nausea  diphenhydrAMINE 25 milliGRAM(s) Oral every 6 hours PRN Pruritus  lanolin Ointment 1 Application(s) Topical every 6 hours PRN Sore Nipples  magnesium hydroxide Suspension 30 milliLiter(s) Oral two times a day PRN Constipation  ondansetron Injectable 4 milliGRAM(s) IV Push every 6 hours PRN Nausea  oxyCODONE    IR 5 milliGRAM(s) Oral every 3 hours PRN Moderate to Severe Pain (4-10)  oxyCODONE    IR 5 milliGRAM(s) Oral once PRN Moderate to Severe Pain (4-10)    Physical Exam  Vital Signs Last 24 Hrs  T(C): 36.7 (2023 08:45), Max: 36.9 (2023 13:33)  T(F): 98.1 (2023 08:45), Max: 98.4 (2023 13:33)  HR: 71 (2023 08:45) (48 - 95)  BP: 116/68 (2023 08:45) (96/54 - 155/81)  RR: 18 (2023 08:45) (18 - 18)  SpO2: 99% (2023 19:02) (97% - 99%)    Physical exam:  General - AAOx3, NAD  Heart - S1S2, RRR  Lungs - CTA BL  Abdomen:  - Soft, appropriately tender, mildly distended, BS+. Clean, dry, MULU dressing in place and functioning.  - No rebound or guarding  Pelvis/Vagina - Normal Lochia  Extremities - No calf tenderness, no swelling    Labs:                      11.7   30.48 )-----------( 336      ( 2023 23:00 )             36.1                         8.9    13.95 )-----------( 241      ( 2023 17:36 )             27.4     Antibody Screen: NEG (23 @ 14:03)    Prenatal Syphilis Test: Nonreact (23 @ 13:54)    Antibody Screen: NEG (23 @ 14:03)     PGY1 note  Chief Complaint: Post  section and hysterectomy    Patient seen and examined. Pain well controlled at this time. No complaints at this time. Denies fever, chills, nausea, vomiting, chest pain, shortness of breath, severe abdominal pain, heavy vaginal bleeding. Patient is not ambulating. Passing flatus. Tolerating PO fluids. Burk in place draining clear urine.     PAST MEDICAL & SURGICAL HISTORY:  Hypertension  Preeclampsia  Herpes simplex virus (HSV) infection  History of ectopic pregnancy  Delivery by  section of full-term infant  History of D&C  dilation and evacuation  H/O cone biopsy of cervix    MEDICATIONS  (STANDING):  acetaminophen     Tablet .. 975 milliGRAM(s) Oral <User Schedule>  ibuprofen  Tablet. 600 milliGRAM(s) Oral every 6 hours  lactated ringers. 1000 milliLiter(s) (75 mL/Hr) IV Continuous <Continuous>  senna 2 Tablet(s) Oral at bedtime  simethicone 80 milliGRAM(s) Chew every 4 hours    MEDICATIONS  (PRN):  dexAMETHasone  Injectable 4 milliGRAM(s) IV Push every 6 hours PRN Nausea  diphenhydrAMINE 25 milliGRAM(s) Oral every 6 hours PRN Pruritus  lanolin Ointment 1 Application(s) Topical every 6 hours PRN Sore Nipples  magnesium hydroxide Suspension 30 milliLiter(s) Oral two times a day PRN Constipation  ondansetron Injectable 4 milliGRAM(s) IV Push every 6 hours PRN Nausea  oxyCODONE    IR 5 milliGRAM(s) Oral every 3 hours PRN Moderate to Severe Pain (4-10)  oxyCODONE    IR 5 milliGRAM(s) Oral once PRN Moderate to Severe Pain (4-10)    Physical Exam  Vital Signs Last 24 Hrs  T(C): 36.7 (2023 08:45), Max: 36.9 (2023 13:33)  T(F): 98.1 (2023 08:45), Max: 98.4 (2023 13:33)  HR: 71 (2023 08:45) (48 - 95)  BP: 116/68 (2023 08:45) (96/54 - 155/81)  RR: 18 (2023 08:45) (18 - 18)  SpO2: 99% (2023 19:02) (97% - 99%)    Physical exam:  General - AAOx3, NAD  Heart - S1S2, RRR  Lungs - CTA BL  Abdomen:  - Soft, appropriately tender, mildly distended, BS+. Clean, dry, MULU dressing in place and functioning.  - No rebound or guarding  Pelvis/Vagina - no bleeding  Extremities - No calf tenderness, no swelling    Labs:                      11.7   30.48 )-----------( 336      ( 2023 23:00 )             36.1                         8.9    13.95 )-----------( 241      ( 2023 17:36 )             27.4     Antibody Screen: NEG (23 @ 14:03)    Prenatal Syphilis Test: Nonreact (23 @ 13:54)    Antibody Screen: NEG (23 @ 14:03)

## 2023-04-29 NOTE — PROGRESS NOTE ADULT - SUBJECTIVE AND OBJECTIVE BOX
PGY1 note  Chief Complaint: Post  section    Patient seen and examined. Pain well controlled at this time. No complaints at this time. Denies fever, chills, nausea, vomiting, chest pain, shortness of breath, severe abdominal pain, heavy vaginal bleeding.     Patient is not ambulating.  Not passing flatus, Denies bowel movement.   Diet: Clears until labs   silva catheter in place     PAST MEDICAL & SURGICAL HISTORY:  Hypertension  Preeclampsia  Herpes simplex virus (HSV) infection  History of ectopic pregnancy  Delivery by  section of full-term infant  History of D&C  dilation and evacuation  H/O cone biopsy of cervix    MEDICATIONS  (STANDING):  acetaminophen     Tablet .. 975 milliGRAM(s) Oral <User Schedule>  citric acid/sodium citrate Solution 30 milliLiter(s) Oral once  diphtheria/tetanus/pertussis (acellular) Vaccine (Adacel) 0.5 milliLiter(s) IntraMuscular once  ibuprofen  Tablet. 600 milliGRAM(s) Oral every 6 hours  lactated ringers. 1000 milliLiter(s) (125 mL/Hr) IV Continuous <Continuous>  morphine PF Spinal 0.1 milliGRAM(s) IntraThecal. once  oxytocin Infusion 333.333 milliUNIT(s)/Min (1000 mL/Hr) IV Continuous <Continuous>  oxytocin Infusion 333.333 milliUNIT(s)/Min (1000 mL/Hr) IV Continuous <Continuous>  senna 2 Tablet(s) Oral at bedtime  simethicone 80 milliGRAM(s) Chew every 4 hours    Physical Exam  Vital Signs Last 24 Hrs  T(F): 97.9 (2023 19:20), Max: 98.4 (2023 13:33)  HR: 91 (2023 23:50) (74 - 95)  BP: 155/81 (2023 23:50) (96/54 - 155/81)  RR: 18 (2023 19:20) (18 - 18)    Physical exam:  General - AAOx3, NAD  Heart - S1S2, RRR  Lungs - CTA BL  Abdomen:  - Soft, appropriately tender, mildly distended, BS+. Clean, dry, intact dressing steri strips in place over pfannenstiel skin incision.  - Fundus firm, appropriately tender, below the umbilicus  - No rebound or guarding  Pelvis/Vagina - Normal Lochia  Extremities - No calf tenderness, no swelling    Labs:                        11.7   30.48 )-----------( 336      ( 2023 23:00 )             36.1                         8.9    13.95 )-----------( 241      ( 2023 17:36 )             27.4     Antibody Screen: NEG (23 @ 14:03)    Prenatal Syphilis Test: Nonreact (23 @ 13:54)    Antibody Screen: NEG (23 @ 14:03)     PGY1 note  Chief Complaint: Post  section    Patient seen and examined. Pain well controlled at this time. No complaints at this time. Denies fever, chills, nausea, vomiting, chest pain, shortness of breath, severe abdominal pain, heavy vaginal bleeding.     Patient is not ambulating.  Not passing flatus, Denies bowel movement.   Diet: Clears until labs   silva catheter in place     PAST MEDICAL & SURGICAL HISTORY:  Hypertension  Preeclampsia  Herpes simplex virus (HSV) infection  History of ectopic pregnancy  Delivery by  section of full-term infant  History of D&C  dilation and evacuation  H/O cone biopsy of cervix    MEDICATIONS  (STANDING):  acetaminophen     Tablet .. 975 milliGRAM(s) Oral <User Schedule>  citric acid/sodium citrate Solution 30 milliLiter(s) Oral once  diphtheria/tetanus/pertussis (acellular) Vaccine (Adacel) 0.5 milliLiter(s) IntraMuscular once  ibuprofen  Tablet. 600 milliGRAM(s) Oral every 6 hours  lactated ringers. 1000 milliLiter(s) (125 mL/Hr) IV Continuous <Continuous>  morphine PF Spinal 0.1 milliGRAM(s) IntraThecal. once  oxytocin Infusion 333.333 milliUNIT(s)/Min (1000 mL/Hr) IV Continuous <Continuous>  oxytocin Infusion 333.333 milliUNIT(s)/Min (1000 mL/Hr) IV Continuous <Continuous>  senna 2 Tablet(s) Oral at bedtime  simethicone 80 milliGRAM(s) Chew every 4 hours    Physical Exam  Vital Signs Last 24 Hrs  T(F): 97.9 (2023 19:20), Max: 98.4 (2023 13:33)  HR: 91 (2023 23:50) (74 - 95)  BP: 155/81 (2023 23:50) (96/54 - 155/81)  RR: 18 (2023 19:20) (18 - 18)    Physical exam:  General - AAOx3, NAD  Heart - S1S2, RRR  Lungs - CTA BL  Abdomen:  - Soft, appropriately tender, mildly distended, BS+. Clean, dry, MULU dressing in place ad functioning.  - No rebound or guarding  Pelvis/Vagina - Normal Lochia  Extremities - No calf tenderness, no swelling    Labs:                        11.7   30.48 )-----------( 336      ( 2023 23:00 )             36.1                         8.9    13.95 )-----------( 241      ( 2023 17:36 )             27.4     Antibody Screen: NEG (23 @ 14:03)    Prenatal Syphilis Test: Nonreact (23 @ 13:54)    Antibody Screen: NEG (23 @ 14:03)     PGY1 note  Chief Complaint: Post  section    Patient seen and examined. Pain well controlled at this time. No complaints at this time. Denies fever, chills, nausea, vomiting, chest pain, shortness of breath, severe abdominal pain, heavy vaginal bleeding.     Patient is not ambulating.  Not passing flatus, Denies bowel movement.   Diet: Clears until labs   silva catheter in place     PAST MEDICAL & SURGICAL HISTORY:  Hypertension  Preeclampsia  Herpes simplex virus (HSV) infection  History of ectopic pregnancy  Delivery by  section of full-term infant  History of D&C  dilation and evacuation  H/O cone biopsy of cervix    MEDICATIONS  (STANDING):  acetaminophen     Tablet .. 975 milliGRAM(s) Oral <User Schedule>  citric acid/sodium citrate Solution 30 milliLiter(s) Oral once  diphtheria/tetanus/pertussis (acellular) Vaccine (Adacel) 0.5 milliLiter(s) IntraMuscular once  ibuprofen  Tablet. 600 milliGRAM(s) Oral every 6 hours  lactated ringers. 1000 milliLiter(s) (125 mL/Hr) IV Continuous <Continuous>  morphine PF Spinal 0.1 milliGRAM(s) IntraThecal. once  oxytocin Infusion 333.333 milliUNIT(s)/Min (1000 mL/Hr) IV Continuous <Continuous>  oxytocin Infusion 333.333 milliUNIT(s)/Min (1000 mL/Hr) IV Continuous <Continuous>  senna 2 Tablet(s) Oral at bedtime  simethicone 80 milliGRAM(s) Chew every 4 hours    Physical Exam  Vital Signs Last 24 Hrs  T(F): 97.9 (2023 19:20), Max: 98.4 (2023 13:33)  HR: 91 (2023 23:50) (74 - 95)  BP: 155/81 (2023 23:50) (96/54 - 155/81)  RR: 18 (2023 19:20) (18 - 18)    Physical exam:  General - AAOx3, NAD  Heart - S1S2, RRR  Lungs - CTA BL  Abdomen:  - Soft, appropriately tender, mildly distended, BS+. Clean, dry, MULU dressing in place ad functioning.  - No rebound or guarding  Pelvis/Vagina - No bleeding noted  Extremities - No calf tenderness, no swelling    Labs:                        11.7   30.48 )-----------( 336      ( 2023 23:00 )             36.1                         8.9    13.95 )-----------( 241      ( 2023 17:36 )             27.4     Antibody Screen: NEG (23 @ 14:03)    Prenatal Syphilis Test: Nonreact (23 @ 13:54)    Antibody Screen: NEG (23 @ 14:03)

## 2023-04-29 NOTE — PROGRESS NOTE ADULT - ASSESSMENT
A/P: 32 yo now P7 s/p c-hyst  for complete previa and placenta accreta, POD#1 recovering well    -  Pain control per routine  -  encourage ambulation  -  encourage incentive spirometry  -  PO hydration  -  Continue diet as tolerated   -  silva in situ; DC this AM, f/u TOV  -  DVT prophylaxis: ambulation, SCDs, Lovenox    Dr. Vásquez and Dr. Stewart aware   A/P: 34yo now P7 s/p -hysterectomy, BS for complete previa and placenta accreta, s/p 2 units pRBC and TXA x2, EBL 1800cc. POD#1 recovering well with stable labs and vitals    -  Pain control per routine  -  encourage ambulation  -  encourage incentive spirometry  -  PO hydration  -  Continue diet as tolerated   -  silva in situ; DC this AM, f/u TOV  -  DVT prophylaxis: ambulation, SCDs, Lovenox

## 2023-04-30 LAB
BASOPHILS # BLD AUTO: 0.05 K/UL — SIGNIFICANT CHANGE UP (ref 0–0.2)
BASOPHILS NFR BLD AUTO: 0.3 % — SIGNIFICANT CHANGE UP (ref 0–1)
EOSINOPHIL # BLD AUTO: 0.04 K/UL — SIGNIFICANT CHANGE UP (ref 0–0.7)
EOSINOPHIL NFR BLD AUTO: 0.2 % — SIGNIFICANT CHANGE UP (ref 0–8)
HCT VFR BLD CALC: 26.7 % — LOW (ref 37–47)
HGB BLD-MCNC: 8.8 G/DL — LOW (ref 12–16)
IMM GRANULOCYTES NFR BLD AUTO: 1.8 % — HIGH (ref 0.1–0.3)
LYMPHOCYTES # BLD AUTO: 13.7 % — LOW (ref 20.5–51.1)
LYMPHOCYTES # BLD AUTO: 2.28 K/UL — SIGNIFICANT CHANGE UP (ref 1.2–3.4)
MCHC RBC-ENTMCNC: 28.1 PG — SIGNIFICANT CHANGE UP (ref 27–31)
MCHC RBC-ENTMCNC: 33 G/DL — SIGNIFICANT CHANGE UP (ref 32–37)
MCV RBC AUTO: 85.3 FL — SIGNIFICANT CHANGE UP (ref 81–99)
MONOCYTES # BLD AUTO: 0.97 K/UL — HIGH (ref 0.1–0.6)
MONOCYTES NFR BLD AUTO: 5.8 % — SIGNIFICANT CHANGE UP (ref 1.7–9.3)
NEUTROPHILS # BLD AUTO: 13.04 K/UL — HIGH (ref 1.4–6.5)
NEUTROPHILS NFR BLD AUTO: 78.2 % — HIGH (ref 42.2–75.2)
NRBC # BLD: 0 /100 WBCS — SIGNIFICANT CHANGE UP (ref 0–0)
PLATELET # BLD AUTO: 271 K/UL — SIGNIFICANT CHANGE UP (ref 130–400)
PMV BLD: 10.3 FL — SIGNIFICANT CHANGE UP (ref 7.4–10.4)
RBC # BLD: 3.13 M/UL — LOW (ref 4.2–5.4)
RBC # FLD: 13.7 % — SIGNIFICANT CHANGE UP (ref 11.5–14.5)
WBC # BLD: 16.68 K/UL — HIGH (ref 4.8–10.8)
WBC # FLD AUTO: 16.68 K/UL — HIGH (ref 4.8–10.8)

## 2023-04-30 PROCEDURE — 99024 POSTOP FOLLOW-UP VISIT: CPT

## 2023-04-30 RX ADMIN — Medication 975 MILLIGRAM(S): at 03:58

## 2023-04-30 RX ADMIN — Medication 600 MILLIGRAM(S): at 00:24

## 2023-04-30 RX ADMIN — Medication 975 MILLIGRAM(S): at 21:21

## 2023-04-30 RX ADMIN — Medication 600 MILLIGRAM(S): at 19:05

## 2023-04-30 RX ADMIN — Medication 975 MILLIGRAM(S): at 22:00

## 2023-04-30 RX ADMIN — SIMETHICONE 80 MILLIGRAM(S): 80 TABLET, CHEWABLE ORAL at 21:20

## 2023-04-30 RX ADMIN — Medication 975 MILLIGRAM(S): at 15:23

## 2023-04-30 RX ADMIN — Medication 975 MILLIGRAM(S): at 05:00

## 2023-04-30 RX ADMIN — Medication 600 MILLIGRAM(S): at 12:10

## 2023-04-30 RX ADMIN — SENNA PLUS 2 TABLET(S): 8.6 TABLET ORAL at 21:20

## 2023-04-30 RX ADMIN — Medication 600 MILLIGRAM(S): at 06:33

## 2023-04-30 RX ADMIN — SIMETHICONE 80 MILLIGRAM(S): 80 TABLET, CHEWABLE ORAL at 03:57

## 2023-04-30 RX ADMIN — SIMETHICONE 80 MILLIGRAM(S): 80 TABLET, CHEWABLE ORAL at 19:05

## 2023-04-30 RX ADMIN — SIMETHICONE 80 MILLIGRAM(S): 80 TABLET, CHEWABLE ORAL at 06:33

## 2023-04-30 RX ADMIN — SIMETHICONE 80 MILLIGRAM(S): 80 TABLET, CHEWABLE ORAL at 13:14

## 2023-04-30 RX ADMIN — Medication 600 MILLIGRAM(S): at 11:37

## 2023-04-30 RX ADMIN — ENOXAPARIN SODIUM 40 MILLIGRAM(S): 100 INJECTION SUBCUTANEOUS at 13:15

## 2023-04-30 NOTE — PROGRESS NOTE ADULT - ASSESSMENT
A/P: 34yo now P6 s/p -hysterectomy, BS for complete previa and placenta accreta, s/p 2 units pRBC and TXA x2, EBL 1800cc. POD#2 recovering well with stable labs and vitals  -ambulation encouraged  -PO hydration encouraged  -regular diet  -lovenox ordered for DVT prophylaxis  -Incentive Spirometry encouraged  -pain management per routine  -5/1 AM CBC      Dr. Harrington and Dr. Bravo aware

## 2023-04-30 NOTE — PROGRESS NOTE ADULT - SUBJECTIVE AND OBJECTIVE BOX
MONICA FORD  33y  Female    PGY1 Note:  Patient seen and examined bedside. No overnight events. Denies heavy vaginal bleeding. Pain moderately controlled. Ambulating without difficulty. Tolerating diet, voiding, passing flatus, no BM. Breastfeeding.     Physical Exam  Vital Signs Last 24 Hrs  T(C): 36.7 (2023 00:54), Max: 37 (2023 19:54)  T(F): 98 (2023 00:54), Max: 98.6 (2023 19:54)  HR: 73 (2023 00:54) (71 - 87)  BP: 112/59 (2023 00:54) (112/59 - 135/63)  RR: 18 (2023 00:54) (18 - 18)    Parameters below as of 2023 00:54  Patient On (Oxygen Delivery Method): room air    Gen: NAD, sitting comfortably  CV: RRR. No murmurs gallops or rubs.  Pulm: CTAB. No wheezes or rales.  Ext: No calf tenderness, no swelling.   Abd: Nondistended, soft, nontender, BS+  Lochia: Minimal rubra  Wound:  MUUL in place over Pfannenstiel skin incision clean, dry intact. Steris in place. No surrounding edema or erythema.    PAST MEDICAL & SURGICAL HISTORY:  Hypertension  Preeclampsia  Herpes simplex virus (HSV) infection  History of ectopic pregnancy  Delivery by  section of full-term infant  History of D&C  dilation and evacuatio  H/O cone biopsy of cervix    Diet: regular    Labs:                        8.8    16.68 )-----------( 271      ( 2023 06:40 )             26.7                         9.7    28.24 )-----------( 322      ( 2023 12:14 )             29.4          acetaminophen     Tablet .. 975 milliGRAM(s) Oral <User Schedule>  dexAMETHasone  Injectable 4 milliGRAM(s) IV Push every 6 hours PRN  diphenhydrAMINE 25 milliGRAM(s) Oral every 6 hours PRN  diphtheria/tetanus/pertussis (acellular) Vaccine (Adacel) 0.5 milliLiter(s) IntraMuscular once  enoxaparin Injectable 40 milliGRAM(s) SubCutaneous every 24 hours  ibuprofen  Tablet. 600 milliGRAM(s) Oral every 6 hours  lactated ringers. 1000 milliLiter(s) IV Continuous <Continuous>  lanolin Ointment 1 Application(s) Topical every 6 hours PRN  magnesium hydroxide Suspension 30 milliLiter(s) Oral two times a day PRN  morphine PF Spinal 0.1 milliGRAM(s) IntraThecal. once  naloxone Injectable 0.1 milliGRAM(s) IV Push every 3 minutes PRN  ondansetron Injectable 4 milliGRAM(s) IV Push every 6 hours PRN  oxyCODONE    IR 5 milliGRAM(s) Oral every 3 hours PRN  oxyCODONE    IR 5 milliGRAM(s) Oral once PRN  oxyCODONE    IR 5 milliGRAM(s) Oral once PRN  oxytocin Infusion 333.333 milliUNIT(s)/Min IV Continuous <Continuous>  oxytocin Infusion 333.333 milliUNIT(s)/Min IV Continuous <Continuous>  senna 2 Tablet(s) Oral at bedtime  simethicone 80 milliGRAM(s) Chew every 4 hours

## 2023-05-01 ENCOUNTER — APPOINTMENT (OUTPATIENT)
Dept: ANTEPARTUM | Facility: CLINIC | Age: 33
End: 2023-05-01

## 2023-05-01 ENCOUNTER — TRANSCRIPTION ENCOUNTER (OUTPATIENT)
Age: 33
End: 2023-05-01

## 2023-05-01 LAB
BASOPHILS # BLD AUTO: 0.04 K/UL — SIGNIFICANT CHANGE UP (ref 0–0.2)
BASOPHILS NFR BLD AUTO: 0.4 % — SIGNIFICANT CHANGE UP (ref 0–1)
EOSINOPHIL # BLD AUTO: 0.11 K/UL — SIGNIFICANT CHANGE UP (ref 0–0.7)
EOSINOPHIL NFR BLD AUTO: 1.1 % — SIGNIFICANT CHANGE UP (ref 0–8)
HCT VFR BLD CALC: 24.7 % — LOW (ref 37–47)
HGB BLD-MCNC: 7.9 G/DL — LOW (ref 12–16)
IMM GRANULOCYTES NFR BLD AUTO: 1.9 % — HIGH (ref 0.1–0.3)
LYMPHOCYTES # BLD AUTO: 1.76 K/UL — SIGNIFICANT CHANGE UP (ref 1.2–3.4)
LYMPHOCYTES # BLD AUTO: 18.3 % — LOW (ref 20.5–51.1)
MCHC RBC-ENTMCNC: 27.7 PG — SIGNIFICANT CHANGE UP (ref 27–31)
MCHC RBC-ENTMCNC: 32 G/DL — SIGNIFICANT CHANGE UP (ref 32–37)
MCV RBC AUTO: 86.7 FL — SIGNIFICANT CHANGE UP (ref 81–99)
MONOCYTES # BLD AUTO: 0.81 K/UL — HIGH (ref 0.1–0.6)
MONOCYTES NFR BLD AUTO: 8.4 % — SIGNIFICANT CHANGE UP (ref 1.7–9.3)
NEUTROPHILS # BLD AUTO: 6.71 K/UL — HIGH (ref 1.4–6.5)
NEUTROPHILS NFR BLD AUTO: 69.9 % — SIGNIFICANT CHANGE UP (ref 42.2–75.2)
NRBC # BLD: 0 /100 WBCS — SIGNIFICANT CHANGE UP (ref 0–0)
PLATELET # BLD AUTO: 257 K/UL — SIGNIFICANT CHANGE UP (ref 130–400)
PMV BLD: 10.2 FL — SIGNIFICANT CHANGE UP (ref 7.4–10.4)
RBC # BLD: 2.85 M/UL — LOW (ref 4.2–5.4)
RBC # FLD: 14.1 % — SIGNIFICANT CHANGE UP (ref 11.5–14.5)
WBC # BLD: 9.61 K/UL — SIGNIFICANT CHANGE UP (ref 4.8–10.8)
WBC # FLD AUTO: 9.61 K/UL — SIGNIFICANT CHANGE UP (ref 4.8–10.8)

## 2023-05-01 PROCEDURE — 99232 SBSQ HOSP IP/OBS MODERATE 35: CPT | Mod: 24

## 2023-05-01 RX ORDER — IRON SUCROSE 20 MG/ML
200 INJECTION, SOLUTION INTRAVENOUS ONCE
Refills: 0 | Status: DISCONTINUED | OUTPATIENT
Start: 2023-05-01 | End: 2023-05-01

## 2023-05-01 RX ORDER — LANOLIN
1 OINTMENT (GRAM) TOPICAL
Qty: 0 | Refills: 0 | DISCHARGE
Start: 2023-05-01

## 2023-05-01 RX ORDER — IRON SUCROSE 20 MG/ML
200 INJECTION, SOLUTION INTRAVENOUS ONCE
Refills: 0 | Status: COMPLETED | OUTPATIENT
Start: 2023-05-01 | End: 2023-05-01

## 2023-05-01 RX ADMIN — Medication 600 MILLIGRAM(S): at 11:37

## 2023-05-01 RX ADMIN — Medication 975 MILLIGRAM(S): at 03:21

## 2023-05-01 RX ADMIN — SIMETHICONE 80 MILLIGRAM(S): 80 TABLET, CHEWABLE ORAL at 09:11

## 2023-05-01 RX ADMIN — Medication 975 MILLIGRAM(S): at 12:00

## 2023-05-01 RX ADMIN — Medication 600 MILLIGRAM(S): at 23:51

## 2023-05-01 RX ADMIN — Medication 975 MILLIGRAM(S): at 20:25

## 2023-05-01 RX ADMIN — Medication 600 MILLIGRAM(S): at 06:27

## 2023-05-01 RX ADMIN — SIMETHICONE 80 MILLIGRAM(S): 80 TABLET, CHEWABLE ORAL at 21:02

## 2023-05-01 RX ADMIN — Medication 600 MILLIGRAM(S): at 18:49

## 2023-05-01 RX ADMIN — Medication 975 MILLIGRAM(S): at 09:11

## 2023-05-01 RX ADMIN — Medication 600 MILLIGRAM(S): at 19:55

## 2023-05-01 RX ADMIN — SENNA PLUS 2 TABLET(S): 8.6 TABLET ORAL at 21:02

## 2023-05-01 RX ADMIN — Medication 975 MILLIGRAM(S): at 04:00

## 2023-05-01 RX ADMIN — IRON SUCROSE 110 MILLIGRAM(S): 20 INJECTION, SOLUTION INTRAVENOUS at 09:12

## 2023-05-01 RX ADMIN — Medication 975 MILLIGRAM(S): at 20:40

## 2023-05-01 RX ADMIN — Medication 600 MILLIGRAM(S): at 14:13

## 2023-05-01 RX ADMIN — Medication 600 MILLIGRAM(S): at 23:59

## 2023-05-01 RX ADMIN — Medication 600 MILLIGRAM(S): at 00:38

## 2023-05-01 RX ADMIN — Medication 600 MILLIGRAM(S): at 07:00

## 2023-05-01 RX ADMIN — Medication 600 MILLIGRAM(S): at 01:15

## 2023-05-01 NOTE — PROGRESS NOTE ADULT - SUBJECTIVE AND OBJECTIVE BOX
SAVARIN FORD  33y  Female    PGY4 Note:  Patient seen and examined bedside. No overnight events. Denies heavy vaginal bleeding. Pain well controlled. Ambulating without difficulty. Tolerating diet, voiding, passing flatus, no BM. Breastfeeding.     Physical Exam  Vital Signs Last 24 Hrs  T(C): 36.8 (01 May 2023 03:28), Max: 36.8 (2023 07:25)  T(F): 98.3 (01 May 2023 03:28), Max: 98.3 (01 May 2023 03:28)  HR: 96 (01 May 2023 03:28) (73 - 96)  BP: 134/70 (01 May 2023 03:28) (85/51 - 134/70)  RR: 18 (01 May 2023 03:28) (18 - 18)    Parameters below as of 01 May 2023 03:28  Patient On (Oxygen Delivery Method): room air        Gen: NAD, sitting comfortably  CV: RRR. No murmurs gallops or rubs.  Pulm: CTAB. No wheezes or rales.  Ext: No calf tenderness, no swelling.   Abd: Nondistended, soft, nontender, BS+, fundus firm, and below umbilicus.   Lochia: Minimal rubra  Wound: MULU dressing c/d/i. No surrounding edema or erythema.        PAST MEDICAL & SURGICAL HISTORY:  Hypertension      Preeclampsia      Herpes simplex virus (HSV) infection      History of ectopic pregnancy      Delivery by  section of full-term infant      History of D&C  dilation and evacuation      H/O cone biopsy of cervix      Diet: regular    Labs:                        8.8    16.68 )-----------( 271      ( 2023 06:40 )             26.7                         9.7    28.24 )-----------( 322      ( 2023 12:14 )             29.4        MEDICATIONS  (STANDING):  acetaminophen     Tablet .. 975 milliGRAM(s) Oral <User Schedule>  diphtheria/tetanus/pertussis (acellular) Vaccine (Adacel) 0.5 milliLiter(s) IntraMuscular once  enoxaparin Injectable 40 milliGRAM(s) SubCutaneous every 24 hours  ibuprofen  Tablet. 600 milliGRAM(s) Oral every 6 hours  morphine PF Spinal 0.1 milliGRAM(s) IntraThecal. once  oxytocin Infusion 333.333 milliUNIT(s)/Min (1000 mL/Hr) IV Continuous <Continuous>  oxytocin Infusion 333.333 milliUNIT(s)/Min (1000 mL/Hr) IV Continuous <Continuous>  senna 2 Tablet(s) Oral at bedtime  simethicone 80 milliGRAM(s) Chew every 4 hours    MEDICATIONS  (PRN):  dexAMETHasone  Injectable 4 milliGRAM(s) IV Push every 6 hours PRN Nausea  diphenhydrAMINE 25 milliGRAM(s) Oral every 6 hours PRN Pruritus  lanolin Ointment 1 Application(s) Topical every 6 hours PRN Sore Nipples  magnesium hydroxide Suspension 30 milliLiter(s) Oral two times a day PRN Constipation  naloxone Injectable 0.1 milliGRAM(s) IV Push every 3 minutes PRN For ANY of the following changes in patient status:  A. Breaths Per Minute LESS THAN 10, B. Oxygen saturation LESS THAN 90%, C. Sedation score of 6 for Stop After: 4 Times  ondansetron Injectable 4 milliGRAM(s) IV Push every 6 hours PRN Nausea  oxyCODONE    IR 5 milliGRAM(s) Oral once PRN Moderate to Severe Pain (4-10)  oxyCODONE    IR 5 milliGRAM(s) Oral every 3 hours PRN Moderate to Severe Pain (4-10)  oxyCODONE    IR 5 milliGRAM(s) Oral once PRN Moderate to Severe Pain (4-10)        SAVARIN FORD  33y  Female    PGY4 Note:  Patient seen and examined bedside. No overnight events. Denies heavy vaginal bleeding. Pain well controlled. Ambulating without difficulty. Tolerating diet, voiding, passing flatus, no BM. Breastfeeding.     Physical Exam  Vital Signs Last 24 Hrs  T(C): 36.8 (01 May 2023 03:28), Max: 36.8 (2023 07:25)  T(F): 98.3 (01 May 2023 03:28), Max: 98.3 (01 May 2023 03:28)  HR: 96 (01 May 2023 03:28) (73 - 96)  BP: 134/70 (01 May 2023 03:28) (85/51 - 134/70)  RR: 18 (01 May 2023 03:28) (18 - 18)    Parameters below as of 01 May 2023 03:28  Patient On (Oxygen Delivery Method): room air        Gen: NAD, sitting comfortably  CV: RRR. No murmurs gallops or rubs.  Pulm: CTAB. No wheezes or rales.  Ext: No calf tenderness, no swelling.   Abd: Nondistended, soft, nontender, BS+, fundus firm, and below umbilicus.   Lochia: Minimal rubra  Wound: MULU dressing c/d/i. No surrounding edema or erythema.        PAST MEDICAL & SURGICAL HISTORY:  Hypertension      Preeclampsia      Herpes simplex virus (HSV) infection      History of ectopic pregnancy      Delivery by  section of full-term infant      History of D&C  dilation and evacuation      H/O cone biopsy of cervix      Diet: regular    Labs:                        8.8    16.68 )-----------( 271      ( 2023 06:40 )             26.7                         9.7    28.24 )-----------( 322      ( 2023 12:14 )             29.4

## 2023-05-01 NOTE — DISCHARGE NOTE OB - NS MD DC FALL RISK RISK
For information on Fall & Injury Prevention, visit: [email or web address] OR  [email or web address] OR  https://www.cdc.gov/steadi/patient.html

## 2023-05-01 NOTE — DISCHARGE NOTE OB - HOSPITAL COURSE
Patient was noted to have adherent placenta at time of  section.  Decision was made to proceed with hysterectomy/BS.  Completed procedure without difficulty.  Intraop received 2u PRBCs.  Postoperatively, patient recovered without difficulty.

## 2023-05-01 NOTE — DISCHARGE NOTE OB - CARE PLAN
1 Principal Discharge DX:	Delivery by  hysterectomy  Assessment and plan of treatment:	Nothing in the vagina for 6 weeks (no sex, no tampons, no douching). Avoid tub baths, you may shower.  If you have a fever of 101F or greater, severe vaginal bleeding, or severe abdominal pain, call your Ob/Gyn or come to the emergency department immediately.  Please follow up with your provider in 1 weeks for postpartum visit.  Secondary Diagnosis:	Chronic hypertension  Assessment and plan of treatment:	If you notice a headache that won't go away, changes in vision, chest pain, shortness of breath, abdominal pain, severe leg swelling or pain please call your provider or go to the hospital.  If your blood pressure is 160/110 or higher, call your doctor or go to the hospital.

## 2023-05-01 NOTE — DISCHARGE NOTE OB - PLAN OF CARE
If you notice a headache that won't go away, changes in vision, chest pain, shortness of breath, abdominal pain, severe leg swelling or pain please call your provider or go to the hospital.  If your blood pressure is 160/110 or higher, call your doctor or go to the hospital. Nothing in the vagina for 6 weeks (no sex, no tampons, no douching). Avoid tub baths, you may shower.  If you have a fever of 101F or greater, severe vaginal bleeding, or severe abdominal pain, call your Ob/Gyn or come to the emergency department immediately.  Please follow up with your provider in 1 weeks for postpartum visit.

## 2023-05-01 NOTE — DISCHARGE NOTE OB - AVOID SITTING IN ONE POSITION FOR MORE THAN ONE HOUR
DATE OF PROCEDURE:  11/07/2018.    PREOPERATIVE DIAGNOSES:  1.  Right shoulder partial tear, rotator cuff.  2.  Right shoulder acromioclavicular arthritis.    POSTOPERATIVE DIAGNOSES:  1.  Right shoulder partial tear, rotator cuff.  2.  Right shoulder acromioclavicular arthritis.    OPERATIVE PROCEDURES:  1.  Right shoulder arthroscopy with subacromial decompression and debridement,   partial tear, rotator cuff.  2.  Right shoulder arthroscopic acromioclavicular joint resection (mini   Jayson).    SURGEON:  Tuan Thurston Jr., M.D.    ANESTHESIA:  General endotracheal.    ESTIMATED BLOOD LOSS:  Minimal.    COMPLICATIONS:  None.    SPECIMENS:  None.    BRIEF INDICATIONS:  A 67-year-old male with right shoulder pain related to   rotator cuff tear, taken to Surgery for the above procedure.  Also has arthritis   of the AC joint.    OPERATIVE PROCEDURE IN DETAIL:  After operative consent was obtained, the   patient brought to the Operating Room, placed supine on the operating room   table.  Anesthesia by GET method was performed by the Anesthesia staff.  After   the patient was asleep, carefully turned to lateral decubitus position,   stabilized on the beanbag, the right shoulder and upper extremity prepped and   draped out in the normal sterile fashion.  The right arm suspended longitudinal   traction 14 pounds.    Following this, a posterolateral stab incision made and the scope inserted in   the right shoulder joint.  Diagnostic arthroscopy showed evidence of a partial   tear of the rotator cuff and some degenerative changes of the joint.  The scope   was reinserted in the subacromial space.  A lateral portal was then created with   a #15 blade and the sucker shaver inserted laterally.  A large amount of bursal   tissue was encountered and completely excised performing a bursectomy.  The CA   ligament was tight and thickened.  It was released and completely excised.    The bone spur was huge and basically extended  all the way down to the rotator   cuff; however, the rotator cuff with a partial tear, but did not have a complete   tear.  The bur was brought in and the bone spur completely removed anteriorly   and a nice acromioplasty performed from lateral to medial decompressing the   subacromial space all the way to the AC joint.  AC joint was severely   degenerative and an AC resection performed with the bur first through lateral   portal and then through an anterior accessory portal completely decompressing   the distal 8 to 10 mm of bone.  After smoothing all bony surfaces, attention   turned to the rotator cuff, which was carefully debrided with the sucker shaver,   but did not require a takedown and repair.  Hemostasis achieved with the Bovie.    The instruments were then removed.  Excess fluid and debris evacuated.  The   portals then closed using interrupted 3-0 nylon suture on the skin.  Sterile   dressing applied followed by a sling.  The patient extubated and brought to the   Recovery Room in stable condition.  All sponge and needle counts reported as   correct.  No complications.      MARKO  dd: 11/07/2018 12:02:47 (CST)  td: 11/07/2018 13:16:49 (CST)  Doc ID   #[id number]  Job ID #[id number]    CC:    Statement Selected

## 2023-05-01 NOTE — DISCHARGE NOTE OB - CARE PROVIDER_API CALL
Jane Vásquez)  KATARINAMITRA  [address] [place] [address]  Phone: [phone]  Fax: [phone]  Follow Up Time: 1 week

## 2023-05-01 NOTE — DISCHARGE NOTE OB - MEDICATION SUMMARY - MEDICATIONS TO TAKE
I will START or STAY ON the medications listed below when I get home from the hospital:    ibuprofen 600 mg oral tablet  -- 1 tab(s) by mouth every 6 hours as needed for pain  -- Indication: For Pain    acetaminophen 325 mg oral tablet  -- 3 tab(s) by mouth every 6 hours as needed for pain  -- Indication: For Pain    lanolin topical ointment  -- 1 Apply on skin to affected area every 6 hours As needed Sore Nipples  -- Indication: For nipple pain    docusate sodium 100 mg oral capsule  -- 1 cap(s) by mouth 2 times a day as needed for pain  -- Indication: For constipation    simethicone 80 mg oral tablet, chewable  -- 1 tab(s) by mouth every 6 hours as needed for gas  -- Indication: For gas

## 2023-05-01 NOTE — DISCHARGE NOTE OB - PATIENT PORTAL LINK FT
You can access the FollowMyHealth Patient Portal offered by [facility] by registering at the following website: [email or web address]. By joining [facility]’s FollowMyHealth portal, you will also be able to view your health information using other applications (apps) compatible with our system.

## 2023-05-01 NOTE — PROGRESS NOTE ADULT - ASSESSMENT
A/P: 32yo now P6 s/p -hysterectomy, BS for complete previa and placenta accreta, with acute blood loss anemia s/p 2 units pRBC and TXA x2, EBL 1800cc, POD#3, recovering well.    -ambulation encouraged  -PO hydration encouraged  -regular diet  -lovenox ordered for DVT prophylaxis  -Incentive Spirometry encouraged  -pain management per routine  -5/1 AM CBC  -venofer ordered x1  -anticipate d/c home today if hemoglobin stable  -SW consult for code grey yesterday    OB attending to be made aware. A/P: 32yo now P6 s/p -hysterectomy, BS for complete previa and placenta accreta, with acute blood loss anemia s/p 2 units pRBC and TXA x2, EBL 1800cc, POD#3, recovering well.    -ambulation encouraged  -PO hydration encouraged  -regular diet  -lovenox ordered for DVT prophylaxis  -Incentive Spirometry encouraged  -pain management per routine  -5/1 AM CBC  -SW consult

## 2023-05-02 VITALS
RESPIRATION RATE: 18 BRPM | DIASTOLIC BLOOD PRESSURE: 68 MMHG | TEMPERATURE: 99 F | HEART RATE: 85 BPM | SYSTOLIC BLOOD PRESSURE: 122 MMHG

## 2023-05-02 DIAGNOSIS — O44.03 COMPLETE PLACENTA PREVIA NOS OR WITHOUT HEMORRHAGE, THIRD TRIMESTER: ICD-10-CM

## 2023-05-02 DIAGNOSIS — O99.213 OBESITY COMPLICATING PREGNANCY, THIRD TRIMESTER: ICD-10-CM

## 2023-05-02 DIAGNOSIS — O09.523 SUPERVISION OF ELDERLY MULTIGRAVIDA, THIRD TRIMESTER: ICD-10-CM

## 2023-05-02 DIAGNOSIS — Z3A.36 36 WEEKS GESTATION OF PREGNANCY: ICD-10-CM

## 2023-05-02 DIAGNOSIS — O34.219 MATERNAL CARE FOR UNSPECIFIED TYPE SCAR FROM PREVIOUS CESAREAN DELIVERY: ICD-10-CM

## 2023-05-02 LAB
BASOPHILS # BLD AUTO: 0.06 K/UL — SIGNIFICANT CHANGE UP (ref 0–0.2)
BASOPHILS NFR BLD AUTO: 0.6 % — SIGNIFICANT CHANGE UP (ref 0–1)
EOSINOPHIL # BLD AUTO: 0.16 K/UL — SIGNIFICANT CHANGE UP (ref 0–0.7)
EOSINOPHIL NFR BLD AUTO: 1.6 % — SIGNIFICANT CHANGE UP (ref 0–8)
HCT VFR BLD CALC: 26.7 % — LOW (ref 37–47)
HGB BLD-MCNC: 8.3 G/DL — LOW (ref 12–16)
IMM GRANULOCYTES NFR BLD AUTO: 2.7 % — HIGH (ref 0.1–0.3)
LYMPHOCYTES # BLD AUTO: 1.95 K/UL — SIGNIFICANT CHANGE UP (ref 1.2–3.4)
LYMPHOCYTES # BLD AUTO: 19.7 % — LOW (ref 20.5–51.1)
MCHC RBC-ENTMCNC: 27.3 PG — SIGNIFICANT CHANGE UP (ref 27–31)
MCHC RBC-ENTMCNC: 31.1 G/DL — LOW (ref 32–37)
MCV RBC AUTO: 87.8 FL — SIGNIFICANT CHANGE UP (ref 81–99)
MONOCYTES # BLD AUTO: 0.7 K/UL — HIGH (ref 0.1–0.6)
MONOCYTES NFR BLD AUTO: 7.1 % — SIGNIFICANT CHANGE UP (ref 1.7–9.3)
NEUTROPHILS # BLD AUTO: 6.76 K/UL — HIGH (ref 1.4–6.5)
NEUTROPHILS NFR BLD AUTO: 68.3 % — SIGNIFICANT CHANGE UP (ref 42.2–75.2)
NRBC # BLD: 0 /100 WBCS — SIGNIFICANT CHANGE UP (ref 0–0)
PLATELET # BLD AUTO: 297 K/UL — SIGNIFICANT CHANGE UP (ref 130–400)
PMV BLD: 10.3 FL — SIGNIFICANT CHANGE UP (ref 7.4–10.4)
RBC # BLD: 3.04 M/UL — LOW (ref 4.2–5.4)
RBC # FLD: 14.1 % — SIGNIFICANT CHANGE UP (ref 11.5–14.5)
WBC # BLD: 9.9 K/UL — SIGNIFICANT CHANGE UP (ref 4.8–10.8)
WBC # FLD AUTO: 9.9 K/UL — SIGNIFICANT CHANGE UP (ref 4.8–10.8)

## 2023-05-02 PROCEDURE — 99238 HOSP IP/OBS DSCHRG MGMT 30/<: CPT | Mod: 24

## 2023-05-02 RX ADMIN — Medication 600 MILLIGRAM(S): at 13:40

## 2023-05-02 RX ADMIN — Medication 975 MILLIGRAM(S): at 02:05

## 2023-05-02 RX ADMIN — Medication 600 MILLIGRAM(S): at 06:40

## 2023-05-02 RX ADMIN — Medication 975 MILLIGRAM(S): at 09:03

## 2023-05-02 RX ADMIN — Medication 975 MILLIGRAM(S): at 12:26

## 2023-05-02 RX ADMIN — SIMETHICONE 80 MILLIGRAM(S): 80 TABLET, CHEWABLE ORAL at 06:39

## 2023-05-02 RX ADMIN — Medication 975 MILLIGRAM(S): at 03:58

## 2023-05-02 RX ADMIN — SIMETHICONE 80 MILLIGRAM(S): 80 TABLET, CHEWABLE ORAL at 02:06

## 2023-05-02 RX ADMIN — SIMETHICONE 80 MILLIGRAM(S): 80 TABLET, CHEWABLE ORAL at 13:25

## 2023-05-02 RX ADMIN — Medication 600 MILLIGRAM(S): at 13:25

## 2023-05-02 RX ADMIN — Medication 600 MILLIGRAM(S): at 06:53

## 2023-05-02 NOTE — PROGRESS NOTE ADULT - SUBJECTIVE AND OBJECTIVE BOX
SAVARIN FORD  33y  Female    PGY4 Note:  Patient seen and examined bedside. No overnight events. Denies heavy vaginal bleeding. Pain well controlled. Ambulating without difficulty. Tolerating diet, voiding, passing flatus, no BM. Breastfeeding.     Physical Exam  Vital Signs Last 24 Hrs  T(C): 35.9 (02 May 2023 08:09), Max: 37.1 (01 May 2023 11:52)  T(F): 96.7 (02 May 2023 08:09), Max: 98.8 (01 May 2023 11:52)  HR: 97 (02 May 2023 08:09) (85 - 100)  BP: 115/62 (02 May 2023 08:09) (90/52 - 136/76)  RR: 18 (02 May 2023 08:09) (18 - 20)    Parameters below as of 02 May 2023 03:43  Patient On (Oxygen Delivery Method): room air    Gen: NAD, sitting comfortably  CV: RRR. No murmurs gallops or rubs.  Pulm: CTAB. No wheezes or rales.  Ext: No calf tenderness, no swelling.   Abd: Nondistended, soft, nontender, BS+, fundus firm, and below umbilicus.   Lochia: Minimal rubra  Wound: MULU dressing changed. Pfannenstiel skin incision clean, dry intact. Steris in place. No surrounding edema or erythema.        PAST MEDICAL & SURGICAL HISTORY:  Hypertension      Preeclampsia      Herpes simplex virus (HSV) infection      History of ectopic pregnancy      Delivery by  section of full-term infant      History of D&C  dilation and evacuation      H/O cone biopsy of cervix          Diet: regular    Labs:                        8.3    9.90  )-----------( 297      ( 02 May 2023 07:12 )             26.7                         7.9    9.61  )-----------( 257      ( 01 May 2023 07:41 )             24.7          acetaminophen     Tablet .. 975 milliGRAM(s) Oral <User Schedule>  dexAMETHasone  Injectable 4 milliGRAM(s) IV Push every 6 hours PRN  diphenhydrAMINE 25 milliGRAM(s) Oral every 6 hours PRN  diphtheria/tetanus/pertussis (acellular) Vaccine (Adacel) 0.5 milliLiter(s) IntraMuscular once  enoxaparin Injectable 40 milliGRAM(s) SubCutaneous every 24 hours  ibuprofen  Tablet. 600 milliGRAM(s) Oral every 6 hours  lanolin Ointment 1 Application(s) Topical every 6 hours PRN  magnesium hydroxide Suspension 30 milliLiter(s) Oral two times a day PRN  morphine PF Spinal 0.1 milliGRAM(s) IntraThecal. once  naloxone Injectable 0.1 milliGRAM(s) IV Push every 3 minutes PRN  ondansetron Injectable 4 milliGRAM(s) IV Push every 6 hours PRN  oxyCODONE    IR 5 milliGRAM(s) Oral once PRN  oxyCODONE    IR 5 milliGRAM(s) Oral every 3 hours PRN  oxyCODONE    IR 5 milliGRAM(s) Oral once PRN  oxytocin Infusion 333.333 milliUNIT(s)/Min IV Continuous <Continuous>  oxytocin Infusion 333.333 milliUNIT(s)/Min IV Continuous <Continuous>  senna 2 Tablet(s) Oral at bedtime  simethicone 80 milliGRAM(s) Chew every 4 hours

## 2023-05-02 NOTE — PROGRESS NOTE ADULT - ATTENDING COMMENTS
Patient seen and assessed at bedside this am  no issues overnight  labs and surgical findings reviewed  Endorsed understanding that hysterectomy was necessary given accreta   all questions answered
32 y/o POD#2 s/p c-hyst for placenta accreta, stable, doing well. Continue close observation. Follow up AM CBC.
POD3 s/p c-hyst  s/p 2u pRBC  HGB downtrend from 10-8, pt asymptomic, likely appropriate decrease, VS WNL, repeat CBC tomorrow AM  labs otherwise stable  RH+  continue post op care
POD4 s/p c/hyst for placenta accreta  Hb now 8.3  Pt doing well  pain meds prn  for probable d/c home today with outpatient follow up
Patient seen at bedside doing well.  F/u TOV this am   dvt ppx  f/u am labs   regular diet   ambulation encouraged

## 2023-05-02 NOTE — PROGRESS NOTE ADULT - ASSESSMENT
A/P: 34yo now P6 s/p -hysterectomy, BS for complete previa and placenta accreta, with acute blood loss anemia s/p 2 units pRBC and TXA x2, EBL 1800cc, POD#4, recovering well.    -ambulation encouraged  -PO hydration encouraged  -regular diet  -lovenox ordered for DVT prophylaxis  -Incentive Spirometry encouraged  -pain management per routine  -5/2 AM CBC stable  -s/p SW consult  -d/c home today, f/u in 1 wk for postop visit    Dr. Pascual aware.

## 2023-05-03 RX ORDER — ACETAMINOPHEN 500 MG
2 TABLET ORAL
Refills: 0
Start: 2023-05-03

## 2023-05-03 RX ORDER — ACETAMINOPHEN 500 MG
3 TABLET ORAL
Qty: 84 | Refills: 1
Start: 2023-05-03 | End: 2023-05-16

## 2023-05-03 RX ORDER — OXYCODONE HYDROCHLORIDE 5 MG/1
1 TABLET ORAL
Qty: 12 | Refills: 0
Start: 2023-05-03

## 2023-05-03 RX ORDER — SIMETHICONE 80 MG/1
1 TABLET, CHEWABLE ORAL
Qty: 30 | Refills: 0
Start: 2023-05-03 | End: 2023-06-01

## 2023-05-03 RX ORDER — IBUPROFEN 200 MG
1 TABLET ORAL
Qty: 56 | Refills: 0
Start: 2023-05-03 | End: 2023-05-16

## 2023-05-04 ENCOUNTER — APPOINTMENT (OUTPATIENT)
Dept: ANTEPARTUM | Facility: CLINIC | Age: 33
End: 2023-05-04

## 2023-05-05 LAB — SURGICAL PATHOLOGY STUDY: SIGNIFICANT CHANGE UP

## 2023-05-08 ENCOUNTER — APPOINTMENT (OUTPATIENT)
Dept: ANTEPARTUM | Facility: CLINIC | Age: 33
End: 2023-05-08

## 2023-05-11 ENCOUNTER — APPOINTMENT (OUTPATIENT)
Dept: ANTEPARTUM | Facility: CLINIC | Age: 33
End: 2023-05-11

## 2023-05-11 ENCOUNTER — OUTPATIENT (OUTPATIENT)
Dept: OUTPATIENT SERVICES | Facility: HOSPITAL | Age: 33
LOS: 1 days | End: 2023-05-11
Payer: MEDICAID

## 2023-05-11 ENCOUNTER — NON-APPOINTMENT (OUTPATIENT)
Age: 33
End: 2023-05-11

## 2023-05-11 ENCOUNTER — APPOINTMENT (OUTPATIENT)
Dept: OBGYN | Facility: CLINIC | Age: 33
End: 2023-05-11
Payer: MEDICAID

## 2023-05-11 VITALS
SYSTOLIC BLOOD PRESSURE: 100 MMHG | HEIGHT: 62 IN | WEIGHT: 237 LBS | BODY MASS INDEX: 43.61 KG/M2 | DIASTOLIC BLOOD PRESSURE: 70 MMHG

## 2023-05-11 DIAGNOSIS — Z98.890 OTHER SPECIFIED POSTPROCEDURAL STATES: Chronic | ICD-10-CM

## 2023-05-11 DIAGNOSIS — Z34.90 ENCOUNTER FOR SUPERVISION OF NORMAL PREGNANCY, UNSPECIFIED, UNSPECIFIED TRIMESTER: ICD-10-CM

## 2023-05-11 PROBLEM — Z87.59 PERSONAL HISTORY OF OTHER COMPLICATIONS OF PREGNANCY, CHILDBIRTH AND THE PUERPERIUM: Chronic | Status: ACTIVE | Noted: 2023-04-28

## 2023-05-11 PROBLEM — I10 ESSENTIAL (PRIMARY) HYPERTENSION: Chronic | Status: ACTIVE | Noted: 2023-04-28

## 2023-05-11 PROBLEM — B00.9 HERPESVIRAL INFECTION, UNSPECIFIED: Chronic | Status: ACTIVE | Noted: 2023-04-28

## 2023-05-11 PROBLEM — O14.90 UNSPECIFIED PRE-ECLAMPSIA, UNSPECIFIED TRIMESTER: Chronic | Status: ACTIVE | Noted: 2023-04-28

## 2023-05-11 PROCEDURE — 99212 OFFICE O/P EST SF 10 MIN: CPT | Mod: 24

## 2023-05-11 PROCEDURE — 99212 OFFICE O/P EST SF 10 MIN: CPT

## 2023-05-15 ENCOUNTER — APPOINTMENT (OUTPATIENT)
Dept: ANTEPARTUM | Facility: CLINIC | Age: 33
End: 2023-05-15

## 2023-05-18 ENCOUNTER — APPOINTMENT (OUTPATIENT)
Dept: ANTEPARTUM | Facility: CLINIC | Age: 33
End: 2023-05-18

## 2023-06-01 NOTE — DISCUSSION/SUMMARY
[FreeTextEntry1] : 23\par MFM Att'g Consult\par 32 y/o  at 35w4d VAISHALI 23 by 1st trimester sonogram, here for follow-up MFM visit for chronic HTN and poor obstetric history and complete posterior placenta previa co-managed by Dr. Vásquez. \par Patient doing well endorsing occasional contractions but very irregular. Denies LOF, VB. Good fetal movement. Patient not taking BPs daily but states when she does it is normal.\par \par PMHx: Reports being diagnosed with hypertension but was never placed on medications. \par  Left carpal tunnel syndrome. \par Surg: LEEP, now PAP is negative but she has been HPV positive. D&C for her SAB. Gum surgery. \par Meds: LDASA, and PNV. \par Allergies: NYQUIL. Tongue swells. \par Sochx: used to smoke marijuana and stopped when she found out she was pregnant. Denies smoking cigarettes or ETOH use. Lives in an apartment with her children. 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 Has had difficulty attending PNV more than once weekly. \par GynHx: history of LEEP and now HPV positive. Denies fibroids. Reports history of ovarian cysts in the past. \par ObHx: \par G1: FT  x1, 4 month girl infant death (patient did not desire to offer further details). When asked if it was an accident she said yes. \par G2: FT  x1 girl (12 years old now). \par G3: : FT LTCS x1, elective for suspected macrosomia, boy, 9 lbs 12 oz. \par G4: IUFD @5 months (per patient), s/p D&E \par G5: FT LTCS x1, elective repeat \par G6: 32w LTCS for twin pregnancy, complicated by pre-eclampsia at 8 months \par G7: ectopic pregnancy on 2022 s/p mtx treatment \par G8 current \par \par Physical exam: \par VS: Wt 250lbs, /78, HR 97, O2 sat 98% (recorded in [employer]). \par U dip: neg\par Heart: RRR, no M/R/G. \par Lungs: CTAB. \par Abdomen: Gravid,soft, and non tender. \par LE: no erythema, edema or pain. \par \par Reviewed labs available in [employer]. Opos/AntibNeg; HbEP AA (2019)\par : CBC 10k>11/34%<245k\par  Ferritin: 14, GCT: 89, NIPT: Low risk, APLS labs WNL, 24hr UTP 60, baseline PELs WNL \par Third trimester labs not found. \par \par OBUS: \par : @22w4d, breech, posterior previa-central, MVP: 5.21cm, EFW: 569gm, feet/ankles not visualized, EIF, otherwise WNL \par : @26w4d, vertex, posterior placenta previa, MVP 7.8cm, EFW 932g (32%), feet/ankles WNL \par 3/16: @30w4d, vertex, posterior complete previa, MVP 5.51cm, BPP 8/8, EFW 1645g (46%)\par 4/10 BPP: 10/10, MVP: 4.52cm\par  EFW: 2580gm (60%), BPP 10/10, MVP: 3.81cm, \par : vtx, posterior placenta, BPP 8/10 (nonreactive NST), MVP: 8.31cm, \par ---------------\par Assessment and Plan: \par 32 y/o  at 34w1d VAISHALI 23 by 1st trimester sonogram, here for follow-up [facility] visit for chronic HTN and poor obstetric history co-managed by Dr. Vásquez. \par \par 1. Chronic hypertension never on medications currently WNL. \par - BP log not done but patient states WNL at home.\par - Preeclamptic precautions. \par - Fetal growth every 4 weeks. \par - Antepartum surveillance: BPP/NST twice weekly for comorbidities and poor OB history including IUFD.\par - Cardiology referral given but patient overwhelmed by different appointments. \par - Continue ASA 81mg. Sometimes patient forgets doses. \par \par 2. Complete Placenta previa. Bleeding precautions.\par - Pelvic rest. \par - Timing and mode of delivery: , repeat  section #4 because of placenta previa and chronic hypertension. \par 3. AMA: NIPT low risk \par 4. History of infant death, unknown cause \par 5. History of  birth for PEC. \par 6. History of a late pregnancy loss. \par 7. S/p fall x2 due to balance will have patient see ENT. Referral for ENT offered but patient declined due to inability to make multiple appointments. No recent falls or dizziness episodes. \par 8. Morbid obesity. \par 9. S/p methotrexate exposure. fetal echo scheduled - no report in [employer].\par 10. History of genital herpes (noted on EMR header). \par - Valtrex rx sent to pharmacy patient instructed to start taking it daily. \par \par 11. pregnancy\par - Contraceptive planning: Pt requests Mirena IUD at 6w PP. She understands availability of single dose Depo for interval protection postpartum. \par - Follow-up in [facility]  for fetal testing, \par - Third trimester labs not done, to be drawn on admission to L&D. \par \par \par  \par

## 2023-06-03 NOTE — ED PROVIDER NOTE - NSFOLLOWUPINSTRUCTIONS_ED_ALL_ED_FT
26.4 Back Pain    Back pain is very common in adults. The cause of back pain is rarely dangerous and the pain often gets better over time. The cause of your back pain may not be known and may include strain of muscles or ligaments, degeneration of the spinal disks, or arthritis. Occasionally the pain may radiate down your leg(s). Over-the-counter medicines to reduce pain and inflammation are often the most helpful. Stretching and remaining active frequently helps the healing process.     SEEK IMMEDIATE MEDICAL CARE IF YOU HAVE ANY OF THE FOLLOWING SYMPTOMS: bowel or bladder control problems, unusual weakness or numbness in your arms or legs, nausea or vomiting, abdominal pain, fever, dizziness/lightheadedness.

## 2023-06-15 ENCOUNTER — APPOINTMENT (OUTPATIENT)
Dept: OBGYN | Facility: CLINIC | Age: 33
End: 2023-06-15

## 2023-07-05 ENCOUNTER — APPOINTMENT (OUTPATIENT)
Dept: OBGYN | Facility: CLINIC | Age: 33
End: 2023-07-05

## 2023-07-17 LAB
BILIRUB UR QL STRIP: NORMAL
CLARITY UR: CLEAR
COLLECTION METHOD: NORMAL
GLUCOSE UR-MCNC: NORMAL
HCG UR QL: 0.2 EU/DL
HGB UR QL STRIP.AUTO: NORMAL
KETONES UR-MCNC: NORMAL
LEUKOCYTE ESTERASE UR QL STRIP: NORMAL
NITRITE UR QL STRIP: NORMAL
PH UR STRIP: 6.5
PROT UR STRIP-MCNC: NORMAL
SP GR UR STRIP: 1.02

## 2023-10-01 PROBLEM — G56.03 CARPAL TUNNEL SYNDROME, BILATERAL UPPER LIMBS: Status: ACTIVE | Noted: 2019-05-16

## 2023-11-03 ENCOUNTER — NON-APPOINTMENT (OUTPATIENT)
Age: 33
End: 2023-11-03

## 2023-12-13 NOTE — OB PROVIDER H&P - NSCHILDCOND4_OBGYN_ALL_OB
No protocol for requested medication     Medication:   methylpheniDATE (RITALIN) 5 MG tablet 60 tablet 0 11/21/2023 12/21/2023    Sig - Route: Take 1 tablet by mouth 2 times daily (with meals). Take with breakfast and lunch - Oral      Last office visit date: 11/21/23  Pharmacy: [id number] - [place] [address] AT [facility] [address]    Order pended, routed to clinician for review.      Childhood Death

## 2024-01-13 LAB
BACTERIA UR CULT: NORMAL
CREAT SPEC-SCNC: 63 MG/DL
CREAT/PROT UR: 0.1 RATIO
FETAL HEART RATE (BPM): 150
PROT UR-MCNC: 8 MG/DLG/24H

## 2024-12-25 PROBLEM — F10.90 ALCOHOL USE: Status: INACTIVE | Noted: 2021-04-28

## 2025-01-21 NOTE — CONSULT NOTE ADULT - SUBJECTIVE AND OBJECTIVE BOX
PGY1 Note    Chief Complaint: abnormally rising bHCG    HPI: 33yo  LMP ~3-4w ago well known to gyn service as beta list patient. Patient presented to the ED by request of GYN team for abnormally rising bHCG values. She currently denies vaginal bleeding or abdominal pain. Denies lightheadedness, dizziness, SOB, palpitations fevers, chills, nausea, vomiting, diarrhea, dysuria or urinary frequency. Patient was seen by Dr. Hunt today for sonogram which was suspicious for ecoptic pregnancy, her IUD was removed at that time. She was advised to come to the ED for ectopic pregnancy management Unplanned but, desired pregnancy.     HCG Trend:  : 6.03>10.9/34.5<288, 136/4.2/106/19/8/0.6<83, AST/ALT: , O pos, ab screen: neg, bhc.3   hcg 950   hcg (in ED) 1093   hcg 1677 (61%)    Gyn History:  LMP - 3-4 weeks ago (patient unsure of exact date)       Cycle Length - irregular  H/o ovarian cysts, conservatively managed  H/o abnormal pap smears, s/p LEEP  Denies history of uterine fibroids or STIs  Last Pap Smear - >1y ago, normal per patient    OB History:  : FT  x1, 4 month infant death (patient did not desire to offer further details)  G2: FT  x1  G3: FT LTCS x1, elective for suspected macrosomia  G4: IUFD @5 months (per patient), s/p D&E  G5: FT LTCS x1, elective repeat  G6: 32w LTCS for twin pregnancy, complicated by pre-eclampsia  G7: current    Home Medications:  acetaminophen 325 mg oral tablet: 3 tab(s) orally  (2019 08:39)  docusate sodium 100 mg oral capsule: 1 cap(s) orally 2 times a day (2019 08:39)  ibuprofen 600 mg oral tablet: 1 tab(s) orally every 6 hours (2019 08:39)  simethicone 80 mg oral tablet, chewable: 1 tab(s) orally every 6 hours (2019 08:39)    Allergies, Nyquil Cold Medicine (Other)    PAST MEDICAL & SURGICAL HISTORY:  No pertinent past medical history    Delivery by  section of full-term infant  History of D&amp;C  dilation and evacuation  H/O cone biopsy of cervix    FAMILY HISTORY:  Family history of hypertension (Father)    Family history of stomach cancer (Father)    Family history of pancreatic cancer  uncle    SOCIAL HISTORY: Denies cigarette use, alcohol use, or illicit drug use    Vital Signs Last 24 Hrs  T(F): 98.1 (20 May 2022 20:51), Max: 99.5 (20 May 2022 14:07)  HR: 85 (20 May 2022 20:51) (62 - 85)  BP: 132/70 (20 May 2022 20:51) (118/73 - 132/70)  RR: 18 (20 May 2022 20:51) (18 - 18)  Height (cm): 157.5 (22 @ 20:51), 157.5 (22 @ 14:07)  Weight (kg): 103 (22 @ 20:51), 103 (22 @ :07)  BMI (kg/m2): 41.5 (22 @ 20:51), 41.5 (22 @ 14:07)  BSA (m2): 2.02 (22 @ 20:51), 2.02 (22 @ :07)    General Appearance - AAOx3, NAD  Heart - S1S2 regular rate and rhythm  Lung - CTA Bilaterally  Abdomen - Soft, nontender, nondistended, no rebound, no rigidity, no guarding, bowel sounds present  External genitalia: normal appearing female genitalia  Vaginal mucosa: rugated, no lesions, no blood  Cervix: no CMT, closed, no lesions, no bleeding  Uterus: size difficult to palpate due to patient body habitus, no tenderness, no masses, freely mobile.  Adnexa: difficult to palpate due to body habitus      Height (cm): 157.5 (22 @ 20:51), 157.5 (22 @ 14:07)  Weight (kg): 103 (22 @ 20:51), 103 (22 @ 14:07)  BMI (kg/m2): 41.5 (22 @ 20:51), 41.5 (22 @ 14:07)  BSA (m2): 2.02 (22 @ 20:51), 2.02 (22 @ 14:07)    LABS:    pending      RADIOLOGY & ADDITIONAL STUDIES:    Outpatient TVUS      1. THERE IS A LIKELY CORPUS LUTEUM CYST IN   THE RIGHT OVARY MEASURING 2.2 X 1.8 X 1.9 CM.   2. THERE IS A SEPARATE RIGHT SIDED ADNEXAL   MASS MEASURING 1.7 X 1.9 X 1.4 CM.        THERE IS NO GESTATIONAL SAC OR FETAL   POLE NOTED.   3. THERE IS AN INTRAUTERINE IUD NOTED.   SUPERIOR TO THE IUD THERE APPEARS TO BE   FLUID AND A POSSIBLE        BLOOD CLOT.   4. THERE IS FREE FLUID IN THE CUL - DE - SAC.     THE COMBINATION OF A RIGHT ADNEXAL MASS,   RISING BETA HCGs, AND AN INTRAUTERINE IUD   RAISE CONCERN FOR AN ECTOPIC PREGNANCY.   THE PATIENT WILL BE SEEN IN GYNECOLOGY   CLINIC THIS AFTERNOON FOR FURTHER   MANAGEMENT.     A TRANVAGINAL AND TRANSABDOMINAL   APPROACH WERE USED TO VISUALIZE THE   LIKELY ECTOPIC PREGNANCY.
Breath sounds clear and equal bilaterally.

## 2025-05-09 NOTE — BRIEF OPERATIVE NOTE - OPERATION/FINDINGS
live male infant weighing 2550g (5lb9oz) with APGARs 8/8 delivered in vertex presentation and live male infant weighing 2970g (6lb8oz) with APGARs 8/9 delivered in complete breech presentation.   Normal appearing uterus, B/L tubes/ovaries.   Dense adhesions of uterus to anterior abdominal wall.   Paragard IUD placed; Lot # live male infant weighing 2550g (5lb9oz) with APGARs 8/8 delivered in vertex presentation and live male infant weighing 2970g (6lb8oz) with APGARs 8/9 delivered in complete breech presentation.   Normal appearing uterus, B/L tubes/ovaries.   Dense adhesions of uterus to anterior abdominal wall.   Paragard IUD placed show yes

## 2025-06-20 ENCOUNTER — APPOINTMENT (OUTPATIENT)
Dept: NEUROLOGY | Facility: CLINIC | Age: 35
End: 2025-06-20